# Patient Record
Sex: FEMALE | Race: WHITE | NOT HISPANIC OR LATINO | Employment: OTHER | URBAN - METROPOLITAN AREA
[De-identification: names, ages, dates, MRNs, and addresses within clinical notes are randomized per-mention and may not be internally consistent; named-entity substitution may affect disease eponyms.]

---

## 2024-07-23 ENCOUNTER — HOSPITAL ENCOUNTER (OUTPATIENT)
Dept: RADIOLOGY | Facility: CLINIC | Age: 59
Discharge: HOME | End: 2024-07-23
Payer: COMMERCIAL

## 2024-07-23 DIAGNOSIS — R74.8 ABNORMAL LEVELS OF OTHER SERUM ENZYMES: ICD-10-CM

## 2024-07-23 DIAGNOSIS — M54.6 PAIN IN THORACIC SPINE: ICD-10-CM

## 2024-07-23 PROCEDURE — 72072 X-RAY EXAM THORAC SPINE 3VWS: CPT | Performed by: RADIOLOGY

## 2024-07-23 PROCEDURE — 72072 X-RAY EXAM THORAC SPINE 3VWS: CPT

## 2024-07-31 ENCOUNTER — HOSPITAL ENCOUNTER (INPATIENT)
Facility: HOSPITAL | Age: 59
End: 2024-07-31
Attending: STUDENT IN AN ORGANIZED HEALTH CARE EDUCATION/TRAINING PROGRAM | Admitting: INTERNAL MEDICINE
Payer: COMMERCIAL

## 2024-07-31 ENCOUNTER — HOSPITAL ENCOUNTER (OUTPATIENT)
Dept: RADIOLOGY | Facility: CLINIC | Age: 59
Discharge: HOME | End: 2024-07-31
Payer: COMMERCIAL

## 2024-07-31 ENCOUNTER — APPOINTMENT (OUTPATIENT)
Dept: RADIOLOGY | Facility: HOSPITAL | Age: 59
End: 2024-07-31
Payer: COMMERCIAL

## 2024-07-31 ENCOUNTER — CLINICAL SUPPORT (OUTPATIENT)
Dept: EMERGENCY MEDICINE | Facility: HOSPITAL | Age: 59
End: 2024-07-31
Payer: COMMERCIAL

## 2024-07-31 DIAGNOSIS — R60.0 LOCALIZED EDEMA: ICD-10-CM

## 2024-07-31 DIAGNOSIS — I82.220 TUMOR OF RIGHT KIDNEY WITH THROMBUS OF IVC (MULTI): ICD-10-CM

## 2024-07-31 DIAGNOSIS — Z74.09 IMPAIRED FUNCTIONAL MOBILITY AND ENDURANCE: ICD-10-CM

## 2024-07-31 DIAGNOSIS — D49.511 TUMOR OF RIGHT KIDNEY WITH THROMBUS OF IVC (MULTI): ICD-10-CM

## 2024-07-31 DIAGNOSIS — C34.90 NON-SMALL CELL LUNG CANCER, UNSPECIFIED LATERALITY (MULTI): ICD-10-CM

## 2024-07-31 DIAGNOSIS — E87.6 HYPOKALEMIA: Primary | ICD-10-CM

## 2024-07-31 DIAGNOSIS — G89.18 POST-OP PAIN: ICD-10-CM

## 2024-07-31 DIAGNOSIS — K59.03 CONSTIPATION DUE TO PAIN MEDICATION: ICD-10-CM

## 2024-07-31 DIAGNOSIS — R91.8 MASS OF UPPER LOBE OF LEFT LUNG: ICD-10-CM

## 2024-07-31 DIAGNOSIS — M89.8X8 MASS OF SPINE: ICD-10-CM

## 2024-07-31 DIAGNOSIS — N17.9 AKI (ACUTE KIDNEY INJURY) (CMS-HCC): ICD-10-CM

## 2024-07-31 DIAGNOSIS — C80.1 MALIGNANCY (MULTI): ICD-10-CM

## 2024-07-31 DIAGNOSIS — R74.8 ABNORMAL LEVELS OF OTHER SERUM ENZYMES: ICD-10-CM

## 2024-07-31 DIAGNOSIS — Z13.6 ENCOUNTER FOR SCREENING FOR CARDIOVASCULAR DISORDERS: ICD-10-CM

## 2024-07-31 DIAGNOSIS — I82.220 IVC THROMBOSIS (MULTI): ICD-10-CM

## 2024-07-31 DIAGNOSIS — N28.89 RENAL MASS: ICD-10-CM

## 2024-07-31 LAB
ALBUMIN SERPL BCP-MCNC: 2.8 G/DL (ref 3.4–5)
ALP SERPL-CCNC: 188 U/L (ref 33–136)
ALT SERPL W P-5'-P-CCNC: 16 U/L (ref 7–45)
ANION GAP SERPL CALC-SCNC: 17 MMOL/L (ref 10–20)
APTT PPP: 29 SECONDS (ref 27–38)
AST SERPL W P-5'-P-CCNC: 25 U/L (ref 9–39)
ATRIAL RATE: 105 BPM
BASOPHILS # BLD AUTO: 0.1 X10*3/UL (ref 0–0.1)
BASOPHILS NFR BLD AUTO: 0.7 %
BILIRUB SERPL-MCNC: 0.8 MG/DL (ref 0–1.2)
BNP SERPL-MCNC: 51 PG/ML (ref 0–99)
BUN SERPL-MCNC: 18 MG/DL (ref 6–23)
CALCIUM SERPL-MCNC: 8.7 MG/DL (ref 8.6–10.6)
CARDIAC TROPONIN I PNL SERPL HS: 5 NG/L (ref 0–34)
CARDIAC TROPONIN I PNL SERPL HS: 5 NG/L (ref 0–34)
CHLORIDE SERPL-SCNC: 94 MMOL/L (ref 98–107)
CO2 SERPL-SCNC: 26 MMOL/L (ref 21–32)
CREAT SERPL-MCNC: 1.49 MG/DL (ref 0.5–1.05)
EGFRCR SERPLBLD CKD-EPI 2021: 40 ML/MIN/1.73M*2
EOSINOPHIL # BLD AUTO: 0.21 X10*3/UL (ref 0–0.7)
EOSINOPHIL NFR BLD AUTO: 1.4 %
ERYTHROCYTE [DISTWIDTH] IN BLOOD BY AUTOMATED COUNT: 14 % (ref 11.5–14.5)
GLUCOSE SERPL-MCNC: 121 MG/DL (ref 74–99)
HCT VFR BLD AUTO: 31.9 % (ref 36–46)
HGB BLD-MCNC: 10.3 G/DL (ref 12–16)
IMM GRANULOCYTES # BLD AUTO: 0.19 X10*3/UL (ref 0–0.7)
IMM GRANULOCYTES NFR BLD AUTO: 1.3 % (ref 0–0.9)
INR PPP: 1.5 (ref 0.9–1.1)
LACTATE SERPL-SCNC: 0.9 MMOL/L (ref 0.4–2)
LACTATE SERPL-SCNC: 2.6 MMOL/L (ref 0.4–2)
LIPASE SERPL-CCNC: 28 U/L (ref 9–82)
LYMPHOCYTES # BLD AUTO: 1.43 X10*3/UL (ref 1.2–4.8)
LYMPHOCYTES NFR BLD AUTO: 9.4 %
MAGNESIUM SERPL-MCNC: 2.25 MG/DL (ref 1.6–2.4)
MCH RBC QN AUTO: 26.1 PG (ref 26–34)
MCHC RBC AUTO-ENTMCNC: 32.3 G/DL (ref 32–36)
MCV RBC AUTO: 81 FL (ref 80–100)
MONOCYTES # BLD AUTO: 0.98 X10*3/UL (ref 0.1–1)
MONOCYTES NFR BLD AUTO: 6.5 %
NEUTROPHILS # BLD AUTO: 12.25 X10*3/UL (ref 1.2–7.7)
NEUTROPHILS NFR BLD AUTO: 80.7 %
NRBC BLD-RTO: 0 /100 WBCS (ref 0–0)
P OFFSET: 197 MS
P ONSET: 171 MS
PLATELET # BLD AUTO: 601 X10*3/UL (ref 150–450)
POTASSIUM SERPL-SCNC: 2.7 MMOL/L (ref 3.5–5.3)
PR INTERVAL: 100 MS
PROT SERPL-MCNC: 7.3 G/DL (ref 6.4–8.2)
PROTHROMBIN TIME: 17.1 SECONDS (ref 9.8–12.8)
Q ONSET: 221 MS
QRS COUNT: 18 BEATS
QRS DURATION: 78 MS
QT INTERVAL: 380 MS
QTC CALCULATION(BAZETT): 502 MS
QTC FREDERICIA: 457 MS
R AXIS: 8 DEGREES
RBC # BLD AUTO: 3.94 X10*6/UL (ref 4–5.2)
SODIUM SERPL-SCNC: 134 MMOL/L (ref 136–145)
T AXIS: 92 DEGREES
T OFFSET: 411 MS
VENTRICULAR RATE: 105 BPM
WBC # BLD AUTO: 15.2 X10*3/UL (ref 4.4–11.3)

## 2024-07-31 PROCEDURE — 82040 ASSAY OF SERUM ALBUMIN: CPT

## 2024-07-31 PROCEDURE — 2550000001 HC RX 255 CONTRASTS: Mod: SE | Performed by: STUDENT IN AN ORGANIZED HEALTH CARE EDUCATION/TRAINING PROGRAM

## 2024-07-31 PROCEDURE — 86901 BLOOD TYPING SEROLOGIC RH(D): CPT

## 2024-07-31 PROCEDURE — 83690 ASSAY OF LIPASE: CPT | Performed by: STUDENT IN AN ORGANIZED HEALTH CARE EDUCATION/TRAINING PROGRAM

## 2024-07-31 PROCEDURE — 1200000002 HC GENERAL ROOM WITH TELEMETRY DAILY

## 2024-07-31 PROCEDURE — 99223 1ST HOSP IP/OBS HIGH 75: CPT | Performed by: SURGERY

## 2024-07-31 PROCEDURE — 83735 ASSAY OF MAGNESIUM: CPT | Performed by: STUDENT IN AN ORGANIZED HEALTH CARE EDUCATION/TRAINING PROGRAM

## 2024-07-31 PROCEDURE — 93005 ELECTROCARDIOGRAM TRACING: CPT

## 2024-07-31 PROCEDURE — 36415 COLL VENOUS BLD VENIPUNCTURE: CPT | Performed by: STUDENT IN AN ORGANIZED HEALTH CARE EDUCATION/TRAINING PROGRAM

## 2024-07-31 PROCEDURE — 83930 ASSAY OF BLOOD OSMOLALITY: CPT

## 2024-07-31 PROCEDURE — 2500000004 HC RX 250 GENERAL PHARMACY W/ HCPCS (ALT 636 FOR OP/ED): Mod: SE

## 2024-07-31 PROCEDURE — 96375 TX/PRO/DX INJ NEW DRUG ADDON: CPT

## 2024-07-31 PROCEDURE — 76705 ECHO EXAM OF ABDOMEN: CPT

## 2024-07-31 PROCEDURE — 2500000004 HC RX 250 GENERAL PHARMACY W/ HCPCS (ALT 636 FOR OP/ED): Mod: SE | Performed by: STUDENT IN AN ORGANIZED HEALTH CARE EDUCATION/TRAINING PROGRAM

## 2024-07-31 PROCEDURE — 74177 CT ABD & PELVIS W/CONTRAST: CPT

## 2024-07-31 PROCEDURE — 76705 ECHO EXAM OF ABDOMEN: CPT | Performed by: STUDENT IN AN ORGANIZED HEALTH CARE EDUCATION/TRAINING PROGRAM

## 2024-07-31 PROCEDURE — 83605 ASSAY OF LACTIC ACID: CPT | Performed by: STUDENT IN AN ORGANIZED HEALTH CARE EDUCATION/TRAINING PROGRAM

## 2024-07-31 PROCEDURE — 80053 COMPREHEN METABOLIC PANEL: CPT | Performed by: STUDENT IN AN ORGANIZED HEALTH CARE EDUCATION/TRAINING PROGRAM

## 2024-07-31 PROCEDURE — 99255 IP/OBS CONSLTJ NEW/EST HI 80: CPT | Performed by: SURGERY

## 2024-07-31 PROCEDURE — 85025 COMPLETE CBC W/AUTO DIFF WBC: CPT | Performed by: STUDENT IN AN ORGANIZED HEALTH CARE EDUCATION/TRAINING PROGRAM

## 2024-07-31 PROCEDURE — 71275 CT ANGIOGRAPHY CHEST: CPT

## 2024-07-31 PROCEDURE — 84484 ASSAY OF TROPONIN QUANT: CPT | Performed by: STUDENT IN AN ORGANIZED HEALTH CARE EDUCATION/TRAINING PROGRAM

## 2024-07-31 PROCEDURE — 2500000001 HC RX 250 WO HCPCS SELF ADMINISTERED DRUGS (ALT 637 FOR MEDICARE OP): Mod: SE | Performed by: STUDENT IN AN ORGANIZED HEALTH CARE EDUCATION/TRAINING PROGRAM

## 2024-07-31 PROCEDURE — 71275 CT ANGIOGRAPHY CHEST: CPT | Performed by: STUDENT IN AN ORGANIZED HEALTH CARE EDUCATION/TRAINING PROGRAM

## 2024-07-31 PROCEDURE — 99285 EMERGENCY DEPT VISIT HI MDM: CPT

## 2024-07-31 PROCEDURE — 74177 CT ABD & PELVIS W/CONTRAST: CPT | Performed by: STUDENT IN AN ORGANIZED HEALTH CARE EDUCATION/TRAINING PROGRAM

## 2024-07-31 PROCEDURE — 83880 ASSAY OF NATRIURETIC PEPTIDE: CPT | Performed by: STUDENT IN AN ORGANIZED HEALTH CARE EDUCATION/TRAINING PROGRAM

## 2024-07-31 PROCEDURE — 82533 TOTAL CORTISOL: CPT | Performed by: INTERNAL MEDICINE

## 2024-07-31 PROCEDURE — 96374 THER/PROPH/DIAG INJ IV PUSH: CPT

## 2024-07-31 PROCEDURE — 96361 HYDRATE IV INFUSION ADD-ON: CPT

## 2024-07-31 PROCEDURE — 85610 PROTHROMBIN TIME: CPT | Performed by: STUDENT IN AN ORGANIZED HEALTH CARE EDUCATION/TRAINING PROGRAM

## 2024-07-31 RX ORDER — ONDANSETRON HYDROCHLORIDE 2 MG/ML
4 INJECTION, SOLUTION INTRAVENOUS ONCE
Status: COMPLETED | OUTPATIENT
Start: 2024-07-31 | End: 2024-07-31

## 2024-07-31 RX ORDER — ATORVASTATIN CALCIUM 40 MG/1
40 TABLET, FILM COATED ORAL EVERY EVENING
COMMUNITY

## 2024-07-31 RX ORDER — POTASSIUM CHLORIDE 750 MG/1
10 TABLET, EXTENDED RELEASE ORAL EVERY MORNING
COMMUNITY
Start: 2024-07-25 | End: 2024-08-24

## 2024-07-31 RX ORDER — CITALOPRAM 20 MG/1
20 TABLET, FILM COATED ORAL EVERY EVENING
COMMUNITY
Start: 2024-07-22

## 2024-07-31 RX ORDER — LISINOPRIL AND HYDROCHLOROTHIAZIDE 10; 12.5 MG/1; MG/1
1 TABLET ORAL EVERY MORNING
COMMUNITY

## 2024-07-31 RX ORDER — POTASSIUM CHLORIDE 1.5 G/1.58G
40 POWDER, FOR SOLUTION ORAL ONCE
Status: COMPLETED | OUTPATIENT
Start: 2024-07-31 | End: 2024-07-31

## 2024-07-31 RX ORDER — HYDROXYZINE HYDROCHLORIDE 25 MG/1
25 TABLET, FILM COATED ORAL EVERY MORNING
COMMUNITY

## 2024-07-31 RX ORDER — HYDROMORPHONE HYDROCHLORIDE 1 MG/ML
1 INJECTION, SOLUTION INTRAMUSCULAR; INTRAVENOUS; SUBCUTANEOUS ONCE
Status: COMPLETED | OUTPATIENT
Start: 2024-07-31 | End: 2024-07-31

## 2024-07-31 RX ORDER — ATORVASTATIN CALCIUM 40 MG/1
40 TABLET, FILM COATED ORAL DAILY
Status: DISCONTINUED | OUTPATIENT
Start: 2024-08-01 | End: 2024-08-16 | Stop reason: HOSPADM

## 2024-07-31 RX ORDER — POLYETHYLENE GLYCOL 3350 17 G/17G
17 POWDER, FOR SOLUTION ORAL DAILY
Status: DISCONTINUED | OUTPATIENT
Start: 2024-08-01 | End: 2024-08-06

## 2024-07-31 RX ORDER — AMLODIPINE BESYLATE 10 MG/1
1 TABLET ORAL DAILY
COMMUNITY
Start: 2024-07-17 | End: 2024-07-31 | Stop reason: ALTCHOICE

## 2024-07-31 RX ORDER — HEPARIN SODIUM 10000 [USP'U]/100ML
0-4500 INJECTION, SOLUTION INTRAVENOUS CONTINUOUS
Status: DISCONTINUED | OUTPATIENT
Start: 2024-07-31 | End: 2024-08-07

## 2024-07-31 RX ORDER — CITALOPRAM 20 MG/1
20 TABLET, FILM COATED ORAL DAILY
Status: DISCONTINUED | OUTPATIENT
Start: 2024-08-01 | End: 2024-08-16 | Stop reason: HOSPADM

## 2024-07-31 RX ORDER — HYDROXYZINE HYDROCHLORIDE 25 MG/1
25 TABLET, FILM COATED ORAL DAILY
Status: DISCONTINUED | OUTPATIENT
Start: 2024-08-01 | End: 2024-08-09

## 2024-07-31 ASSESSMENT — LIFESTYLE VARIABLES
HAVE PEOPLE ANNOYED YOU BY CRITICIZING YOUR DRINKING: NO
EVER HAD A DRINK FIRST THING IN THE MORNING TO STEADY YOUR NERVES TO GET RID OF A HANGOVER: NO
EVER FELT BAD OR GUILTY ABOUT YOUR DRINKING: NO
TOTAL SCORE: 0
HAVE YOU EVER FELT YOU SHOULD CUT DOWN ON YOUR DRINKING: NO

## 2024-07-31 ASSESSMENT — PAIN - FUNCTIONAL ASSESSMENT
PAIN_FUNCTIONAL_ASSESSMENT: 0-10
PAIN_FUNCTIONAL_ASSESSMENT: 0-10

## 2024-07-31 ASSESSMENT — PAIN SCALES - GENERAL
PAINLEVEL_OUTOF10: 5 - MODERATE PAIN
PAINLEVEL_OUTOF10: 10 - WORST POSSIBLE PAIN

## 2024-07-31 ASSESSMENT — PAIN DESCRIPTION - LOCATION: LOCATION: ABDOMEN

## 2024-07-31 ASSESSMENT — COLUMBIA-SUICIDE SEVERITY RATING SCALE - C-SSRS
6. HAVE YOU EVER DONE ANYTHING, STARTED TO DO ANYTHING, OR PREPARED TO DO ANYTHING TO END YOUR LIFE?: NO
2. HAVE YOU ACTUALLY HAD ANY THOUGHTS OF KILLING YOURSELF?: NO
1. IN THE PAST MONTH, HAVE YOU WISHED YOU WERE DEAD OR WISHED YOU COULD GO TO SLEEP AND NOT WAKE UP?: NO

## 2024-07-31 NOTE — ED PROVIDER NOTES
CC: Abdominal Pain and Back Pain     History provided by: Patient  Limitations to History: None    HPI:  Patient is a 58-year-old female with history of hypertension who presents as a critical activation in setting of hypotension, tachycardia.  She is mentating appropriately, no acute respiratory distress.  She denies any history of blood thinners, malignancy however states she was advised to come in by her doctor due to ultrasound findings that were done today.  She states she had an ultrasound of her kidney and liver done.  Ultrasound results reviewed which show IVC and right renal thrombus.  She denies any current chest pain, shortness of breath, blood thinner use, fevers, chills, cough, chest pain, leg swelling.  She denies any history of blood clots.  She states she was short of breath earlier and she states she has abdominal pain and has back pain but denies any saddle anesthesia, urinary retention.    External Records Reviewed:  I reviewed prior ED visits, Care Everywhere, discharge summaries and outpatient records as appropriate.   ???????????????????????????????????????????????????????????????  Triage Vitals:  T 36.5 °C (97.7 °F)  HR (!) 106  BP 83/55  RR 18  O2 100 % None (Room air)    Physical Exam  Vitals and nursing note reviewed.   Constitutional:       General: She is not in acute distress.     Appearance: Normal appearance.   HENT:      Head: Normocephalic and atraumatic.      Mouth/Throat:      Mouth: Mucous membranes are dry.   Eyes:      Conjunctiva/sclera: Conjunctivae normal.   Cardiovascular:      Rate and Rhythm: Regular rhythm. Tachycardia present.   Pulmonary:      Effort: Pulmonary effort is normal. No respiratory distress.      Breath sounds: Normal breath sounds.   Abdominal:      General: Abdomen is flat. There is no distension.      Palpations: Abdomen is soft.      Comments: RUQ TTP, no guarding or rebound   Musculoskeletal:         General: Normal range of motion.      Cervical  back: Normal range of motion and neck supple.   Skin:     General: Skin is warm and dry.      Comments: No calf tenderness bilaterally   Neurological:      General: No focal deficit present.      Mental Status: She is alert and oriented to person, place, and time. Mental status is at baseline.   Psychiatric:         Mood and Affect: Mood normal.         Behavior: Behavior normal.        ???????????????????????????????????????????????????????????????  ED Course/Treatment/Medical Decision Making  MDM:  Patient is a 58-year-old female who presents as a critical activation in the setting of hypotension, tachycardia.  Patient is overall mentating appropriately, no acute respiratory distress, lungs are clear to auscultation bilaterally.  Ultrasound findings discussed with patient as stated in ED course.  Labs obtained and CT angio for pulmonary embolism obtained as well given history of possible new malignancy shortness of breath.  CT abdomen pelvis with contrast obtained as well.  Patient given IV fluids and Zofran.  I believe dehydration may be contributing to presentation and hypotension as patient states she has been vomiting today, nonbloody nonbilious.  I discussed admission with patient and she is agreeable.  Other differentials considered include PE, sepsis, ACS, biliary pathology.      ED Course:  ED Course as of 07/31/24 2133 Wed Jul 31, 2024 1736 External chart review:  RUQ US today  IMPRESSION:  1. Right upper pole solid renal mass measuring 10.0 x 6.2 x 7.8 cm  likely renal neoplasm with likely involvement of the ipsilateral  renal vein. Adjacent IVC thrombus measuring 4.5 x 2.4 x 2.8 cm with  no definite intrinsic vascularity, still tumor thrombus is highly  suspected.  2. Wall adherent gallbladder nodules measuring up to 0.4 cm. Findings  could represent gallbladder polyps.   [SS]   1740 EKG reviewed sinus tachycardia rate 105, ID interval 100 ms, QRS 78 ms, QTc 502 ms, no acute ST segment elevations or  depressions, no prior to compare [SA]   1904 Labs reviewed with hypokalemia of 2.7, creatinine is elevated, no prior to compare, alkaline phosphatase is elevated [SA]   1904 CBC with leukocytosis of 15.2, anemia of 10.3 and thrombocytosis of 600 [SA]   2019 Vascular surgery consulted [SA]   2020 Troponin, BNP wnl [SA]   2112 Discussed with radiology: large mass encasing L main pulmonary artery, no PE, large subcarinal lymph node causing mass effect on esophagus, CT bilateral necrotic adrenal masses with mass extension of the right into the IVC. 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    Differential diagnoses considered include but are not limited to: See MDM/Below:    Social Determinants Limiting Care:  None identified The following actions were taken to address these social determinants:      Discussion of Management with Other Providers: See MDM/Below:    Disposition:  Admitted    BRIANDA Anguiano, PGY-3    I reviewed the case with the attending ED physician. The attending ED physician agrees with the plan. Patient and/or patient´s representative was counseled regarding labs, imaging, likely diagnosis, and plan. All questions were answered.    Disclaimer: This note was dictated by speech  recognition.  Attempt at proofreading was made to minimize errors.  Errors in transcription may be present.  Please call if questions.    Procedures ? [employer] last updated 7/31/2024 9:33 PM        Darlin Albarran, DO  Resident  07/31/24 2118       Darlin Albarran, DO  Resident  07/31/24 2120       Darlin Albarran, DO  Resident  07/31/24 2133    Emergency Medicine Attending Attestation:     ED Course as of 08/01/24 0327   Wed Jul 31, 2024   1736 External chart review:  RUQ US today  IMPRESSION:  1. Right upper pole solid renal mass measuring 10.0 x 6.2 x 7.8 cm  likely renal neoplasm with likely involvement of the ipsilateral  renal vein. Adjacent IVC thrombus measuring 4.5 x 2.4 x 2.8 cm with  no definite intrinsic vascularity, still tumor thrombus is highly  suspected.  2. Wall adherent gallbladder nodules measuring up to 0.4 cm. Findings  could represent gallbladder polyps.   [SS]   1740 EKG reviewed sinus tachycardia rate 105, WA interval 100 ms, QRS 78 ms, QTc 502 ms, no acute ST segment elevations or depressions, no prior to compare [SA]   1904 Labs reviewed with hypokalemia of 2.7, creatinine is elevated, no prior to compare, alkaline phosphatase is elevated [SA]   1904 CBC with leukocytosis of 15.2, anemia of 10.3 and thrombocytosis of 600 [SA]   2019 Vascular surgery consulted [SA]   2020 Troponin, BNP wnl [SA]   2112 Discussed with radiology: large mass encasing L main pulmonary artery, no PE, large subcarinal lymph node causing mass effect on esophagus, CT bilateral necrotic adrenal masses with mass extension of the right into the IVC. 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  I have personally performed a substantive portion of the encounter.  I have seen and examined the patient; agree with the workup, evaluation, MDM, management and diagnosis.  The care plan has been discussed with the resident/fellow; I have reviewed the resident/fellow’s note and agree with the documented findings with the exception/addition of the following:    Pt presents after outpt RUQ US showed R renal mass and likely IVC thrombus. Pt reports hasn't been eating much, appears dry on exam. Bedside POCUS without evidence of R heart strain to suggest large PE. She was given total of 2.5 L IVF with improvement in her tachycardia and hypotension. CTPE and CT abd/pelvis ordered for further eval. Vasc surg consulted, rec heparin and oncologic workup but otherwise no acute interventions for IVC thrombus. Pt was upated on CT results. Admitted to medicine.     Steffen Simerlink, MD Steffen Simerlink, MD  08/01/24 0334

## 2024-07-31 NOTE — ED TRIAGE NOTES
"Sent from  mentor for large mass on R kidney. Pt states the mass is causing pain to spine and abdomen, states it is better when she is lying down. Pt states she went to [facility] for abd pain and similar complaints when they did an ultrasound and found mass. Pt has hx of HTN, HLD, HARJEET, and major depressive disorder. Pt states pain is \"95/10.\"  "

## 2024-07-31 NOTE — PROGRESS NOTES
Patient has been identified as having an emergent need for administration of iodinated contrast for CT scan prior to result of laboratory studies OR despite known elevated GFR due to possibility of life and/or limb threatening pathology.    I acknowledge the risks and benefits of emergently proceeding with contrast administration including that, at present, it is the position of the [employer] that contrast induced nephropathy (ANIVAL) is a rare but possible consequence. At this time the benefits of proceeding with contrast administration outweigh the risks.    Attempts will be made to mitigate possible ANIVAL risk with IV fluid hydration if able.    Darlin Albarran, PGY3

## 2024-08-01 ENCOUNTER — APPOINTMENT (OUTPATIENT)
Dept: RADIOLOGY | Facility: HOSPITAL | Age: 59
End: 2024-08-01
Payer: COMMERCIAL

## 2024-08-01 PROBLEM — R91.8 MASS OF UPPER LOBE OF LEFT LUNG: Status: ACTIVE | Noted: 2024-08-01

## 2024-08-01 PROBLEM — C80.1 MALIGNANCY (MULTI): Status: RESOLVED | Noted: 2024-08-01 | Resolved: 2024-08-01

## 2024-08-01 PROBLEM — C80.1 MALIGNANCY (MULTI): Status: ACTIVE | Noted: 2024-08-01

## 2024-08-01 LAB
ABO GROUP (TYPE) IN BLOOD: NORMAL
ABO GROUP (TYPE) IN BLOOD: NORMAL
ALBUMIN SERPL BCP-MCNC: 2.4 G/DL (ref 3.4–5)
ALBUMIN SERPL BCP-MCNC: 2.4 G/DL (ref 3.4–5)
ALBUMIN SERPL BCP-MCNC: 2.5 G/DL (ref 3.4–5)
ALBUMIN SERPL BCP-MCNC: 2.6 G/DL (ref 3.4–5)
ALP SERPL-CCNC: 154 U/L (ref 33–136)
ALT SERPL W P-5'-P-CCNC: 11 U/L (ref 7–45)
ANION GAP SERPL CALC-SCNC: 12 MMOL/L (ref 10–20)
ANION GAP SERPL CALC-SCNC: 14 MMOL/L (ref 10–20)
ANION GAP SERPL CALC-SCNC: 14 MMOL/L (ref 10–20)
ANION GAP SERPL CALC-SCNC: 15 MMOL/L (ref 10–20)
ANTIBODY SCREEN: NORMAL
APPEARANCE UR: CLEAR
APTT PPP: 46 SECONDS (ref 27–38)
AST SERPL W P-5'-P-CCNC: 14 U/L (ref 9–39)
BASOPHILS # BLD AUTO: 0.1 X10*3/UL (ref 0–0.1)
BASOPHILS # BLD AUTO: 0.12 X10*3/UL (ref 0–0.1)
BASOPHILS NFR BLD AUTO: 0.8 %
BASOPHILS NFR BLD AUTO: 0.9 %
BILIRUB SERPL-MCNC: 0.6 MG/DL (ref 0–1.2)
BILIRUB UR STRIP.AUTO-MCNC: NEGATIVE MG/DL
BLOOD EXPIRATION DATE: NORMAL
BUN SERPL-MCNC: 16 MG/DL (ref 6–23)
BUN SERPL-MCNC: 17 MG/DL (ref 6–23)
BUN SERPL-MCNC: 18 MG/DL (ref 6–23)
BUN SERPL-MCNC: 19 MG/DL (ref 6–23)
CALCIUM SERPL-MCNC: 7.9 MG/DL (ref 8.6–10.6)
CALCIUM SERPL-MCNC: 7.9 MG/DL (ref 8.6–10.6)
CALCIUM SERPL-MCNC: 8 MG/DL (ref 8.6–10.6)
CALCIUM SERPL-MCNC: 8 MG/DL (ref 8.6–10.6)
CHLORIDE SERPL-SCNC: 101 MMOL/L (ref 98–107)
CHLORIDE SERPL-SCNC: 97 MMOL/L (ref 98–107)
CHLORIDE SERPL-SCNC: 97 MMOL/L (ref 98–107)
CHLORIDE SERPL-SCNC: 99 MMOL/L (ref 98–107)
CHLORIDE UR-SCNC: <15 MMOL/L
CHLORIDE/CREATININE (MMOL/G) IN URINE: NORMAL
CO2 SERPL-SCNC: 22 MMOL/L (ref 21–32)
CO2 SERPL-SCNC: 25 MMOL/L (ref 21–32)
COLOR UR: YELLOW
CORTIS AM PEAK SERPL-MSCNC: 29.7 UG/DL (ref 5–20)
CREAT SERPL-MCNC: 1.12 MG/DL (ref 0.5–1.05)
CREAT SERPL-MCNC: 1.16 MG/DL (ref 0.5–1.05)
CREAT SERPL-MCNC: 1.29 MG/DL (ref 0.5–1.05)
CREAT SERPL-MCNC: 1.39 MG/DL (ref 0.5–1.05)
CREAT UR-MCNC: 75.1 MG/DL (ref 20–320)
DISPENSE STATUS: NORMAL
EGFRCR SERPLBLD CKD-EPI 2021: 44 ML/MIN/1.73M*2
EGFRCR SERPLBLD CKD-EPI 2021: 48 ML/MIN/1.73M*2
EGFRCR SERPLBLD CKD-EPI 2021: 54 ML/MIN/1.73M*2
EGFRCR SERPLBLD CKD-EPI 2021: 56 ML/MIN/1.73M*2
EOSINOPHIL # BLD AUTO: 0.14 X10*3/UL (ref 0–0.7)
EOSINOPHIL # BLD AUTO: 0.19 X10*3/UL (ref 0–0.7)
EOSINOPHIL NFR BLD AUTO: 1.1 %
EOSINOPHIL NFR BLD AUTO: 1.5 %
ERYTHROCYTE [DISTWIDTH] IN BLOOD BY AUTOMATED COUNT: 14.4 % (ref 11.5–14.5)
ERYTHROCYTE [DISTWIDTH] IN BLOOD BY AUTOMATED COUNT: 14.5 % (ref 11.5–14.5)
GLUCOSE SERPL-MCNC: 109 MG/DL (ref 74–99)
GLUCOSE SERPL-MCNC: 91 MG/DL (ref 74–99)
GLUCOSE SERPL-MCNC: 94 MG/DL (ref 74–99)
GLUCOSE SERPL-MCNC: 97 MG/DL (ref 74–99)
GLUCOSE UR STRIP.AUTO-MCNC: NORMAL MG/DL
HCT VFR BLD AUTO: 25.3 % (ref 36–46)
HCT VFR BLD AUTO: 29.2 % (ref 36–46)
HGB BLD-MCNC: 8.2 G/DL (ref 12–16)
HGB BLD-MCNC: 9.1 G/DL (ref 12–16)
IMM GRANULOCYTES # BLD AUTO: 0.11 X10*3/UL (ref 0–0.7)
IMM GRANULOCYTES # BLD AUTO: 0.12 X10*3/UL (ref 0–0.7)
IMM GRANULOCYTES NFR BLD AUTO: 0.9 % (ref 0–0.9)
IMM GRANULOCYTES NFR BLD AUTO: 0.9 % (ref 0–0.9)
INR PPP: 1.6 (ref 0.9–1.1)
KETONES UR STRIP.AUTO-MCNC: NEGATIVE MG/DL
LEUKOCYTE ESTERASE UR QL STRIP.AUTO: NEGATIVE
LYMPHOCYTES # BLD AUTO: 1.94 X10*3/UL (ref 1.2–4.8)
LYMPHOCYTES # BLD AUTO: 2.15 X10*3/UL (ref 1.2–4.8)
LYMPHOCYTES NFR BLD AUTO: 15 %
LYMPHOCYTES NFR BLD AUTO: 16.9 %
MAGNESIUM SERPL-MCNC: 2.17 MG/DL (ref 1.6–2.4)
MCH RBC QN AUTO: 27 PG (ref 26–34)
MCH RBC QN AUTO: 27 PG (ref 26–34)
MCHC RBC AUTO-ENTMCNC: 31.2 G/DL (ref 32–36)
MCHC RBC AUTO-ENTMCNC: 32.4 G/DL (ref 32–36)
MCV RBC AUTO: 83 FL (ref 80–100)
MCV RBC AUTO: 87 FL (ref 80–100)
MONOCYTES # BLD AUTO: 0.81 X10*3/UL (ref 0.1–1)
MONOCYTES # BLD AUTO: 0.85 X10*3/UL (ref 0.1–1)
MONOCYTES NFR BLD AUTO: 6.2 %
MONOCYTES NFR BLD AUTO: 6.7 %
NEUTROPHILS # BLD AUTO: 9.38 X10*3/UL (ref 1.2–7.7)
NEUTROPHILS # BLD AUTO: 9.81 X10*3/UL (ref 1.2–7.7)
NEUTROPHILS NFR BLD AUTO: 73.5 %
NEUTROPHILS NFR BLD AUTO: 75.6 %
NITRITE UR QL STRIP.AUTO: NEGATIVE
NRBC BLD-RTO: 0 /100 WBCS (ref 0–0)
NRBC BLD-RTO: 0 /100 WBCS (ref 0–0)
OSMOLALITY SERPL: 275 MOSM/KG (ref 280–300)
OSMOLALITY UR: 270 MOSM/KG (ref 200–1200)
PH UR STRIP.AUTO: 5.5 [PH]
PHOSPHATE SERPL-MCNC: 3.8 MG/DL (ref 2.5–4.9)
PHOSPHATE SERPL-MCNC: 4.1 MG/DL (ref 2.5–4.9)
PHOSPHATE SERPL-MCNC: 4.8 MG/DL (ref 2.5–4.9)
PLATELET # BLD AUTO: 537 X10*3/UL (ref 150–450)
PLATELET # BLD AUTO: 566 X10*3/UL (ref 150–450)
POTASSIUM SERPL-SCNC: 2.9 MMOL/L (ref 3.5–5.3)
POTASSIUM SERPL-SCNC: 3.2 MMOL/L (ref 3.5–5.3)
POTASSIUM SERPL-SCNC: 3.3 MMOL/L (ref 3.5–5.3)
POTASSIUM SERPL-SCNC: 3.8 MMOL/L (ref 3.5–5.3)
POTASSIUM UR-SCNC: 20 MMOL/L
POTASSIUM/CREAT UR-RTO: 27 MMOL/G CREAT
PRODUCT BLOOD TYPE: 6200
PRODUCT CODE: NORMAL
PROT SERPL-MCNC: 6.1 G/DL (ref 6.4–8.2)
PROT UR STRIP.AUTO-MCNC: ABNORMAL MG/DL
PROTHROMBIN TIME: 18.1 SECONDS (ref 9.8–12.8)
RBC # BLD AUTO: 3.04 X10*6/UL (ref 4–5.2)
RBC # BLD AUTO: 3.37 X10*6/UL (ref 4–5.2)
RBC # UR STRIP.AUTO: ABNORMAL /UL
RBC #/AREA URNS AUTO: ABNORMAL /HPF
RH FACTOR (ANTIGEN D): NORMAL
RH FACTOR (ANTIGEN D): NORMAL
SODIUM SERPL-SCNC: 131 MMOL/L (ref 136–145)
SODIUM SERPL-SCNC: 133 MMOL/L (ref 136–145)
SODIUM SERPL-SCNC: 134 MMOL/L (ref 136–145)
SODIUM SERPL-SCNC: 135 MMOL/L (ref 136–145)
SODIUM UR-SCNC: 12 MMOL/L
SODIUM/CREAT UR-RTO: 16 MMOL/G CREAT
SP GR UR STRIP.AUTO: 1.04
SQUAMOUS #/AREA URNS AUTO: ABNORMAL /HPF
UFH PPP CHRO-ACNC: 0.2 IU/ML
UFH PPP CHRO-ACNC: 0.3 IU/ML
UFH PPP CHRO-ACNC: 0.3 IU/ML
UFH PPP CHRO-ACNC: 0.4 IU/ML
UNIT ABO: NORMAL
UNIT NUMBER: NORMAL
UNIT RH: NORMAL
UNIT VOLUME: 278
UROBILINOGEN UR STRIP.AUTO-MCNC: NORMAL MG/DL
WBC # BLD AUTO: 12.8 X10*3/UL (ref 4.4–11.3)
WBC # BLD AUTO: 13 X10*3/UL (ref 4.4–11.3)
WBC #/AREA URNS AUTO: ABNORMAL /HPF

## 2024-08-01 PROCEDURE — 82374 ASSAY BLOOD CARBON DIOXIDE: CPT

## 2024-08-01 PROCEDURE — 85610 PROTHROMBIN TIME: CPT

## 2024-08-01 PROCEDURE — 2500000004 HC RX 250 GENERAL PHARMACY W/ HCPCS (ALT 636 FOR OP/ED)

## 2024-08-01 PROCEDURE — 85520 HEPARIN ASSAY: CPT | Performed by: INTERNAL MEDICINE

## 2024-08-01 PROCEDURE — 85025 COMPLETE CBC W/AUTO DIFF WBC: CPT

## 2024-08-01 PROCEDURE — 72157 MRI CHEST SPINE W/O & W/DYE: CPT

## 2024-08-01 PROCEDURE — 80069 RENAL FUNCTION PANEL: CPT

## 2024-08-01 PROCEDURE — 70553 MRI BRAIN STEM W/O & W/DYE: CPT

## 2024-08-01 PROCEDURE — 2550000001 HC RX 255 CONTRASTS: Performed by: INTERNAL MEDICINE

## 2024-08-01 PROCEDURE — 81003 URINALYSIS AUTO W/O SCOPE: CPT

## 2024-08-01 PROCEDURE — 2500000004 HC RX 250 GENERAL PHARMACY W/ HCPCS (ALT 636 FOR OP/ED): Performed by: STUDENT IN AN ORGANIZED HEALTH CARE EDUCATION/TRAINING PROGRAM

## 2024-08-01 PROCEDURE — 72158 MRI LUMBAR SPINE W/O & W/DYE: CPT

## 2024-08-01 PROCEDURE — 82436 ASSAY OF URINE CHLORIDE: CPT

## 2024-08-01 PROCEDURE — 1200000002 HC GENERAL ROOM WITH TELEMETRY DAILY

## 2024-08-01 PROCEDURE — 36415 COLL VENOUS BLD VENIPUNCTURE: CPT | Performed by: INTERNAL MEDICINE

## 2024-08-01 PROCEDURE — 2500000001 HC RX 250 WO HCPCS SELF ADMINISTERED DRUGS (ALT 637 FOR MEDICARE OP)

## 2024-08-01 PROCEDURE — 99254 IP/OBS CNSLTJ NEW/EST MOD 60: CPT | Performed by: STUDENT IN AN ORGANIZED HEALTH CARE EDUCATION/TRAINING PROGRAM

## 2024-08-01 PROCEDURE — P9037 PLATE PHERES LEUKOREDU IRRAD: HCPCS

## 2024-08-01 PROCEDURE — A9575 INJ GADOTERATE MEGLUMI 0.1ML: HCPCS | Performed by: INTERNAL MEDICINE

## 2024-08-01 PROCEDURE — 72156 MRI NECK SPINE W/O & W/DYE: CPT

## 2024-08-01 PROCEDURE — 83935 ASSAY OF URINE OSMOLALITY: CPT

## 2024-08-01 PROCEDURE — 36415 COLL VENOUS BLD VENIPUNCTURE: CPT

## 2024-08-01 PROCEDURE — 99254 IP/OBS CNSLTJ NEW/EST MOD 60: CPT | Performed by: INTERNAL MEDICINE

## 2024-08-01 PROCEDURE — 99223 1ST HOSP IP/OBS HIGH 75: CPT

## 2024-08-01 PROCEDURE — 36430 TRANSFUSION BLD/BLD COMPNT: CPT

## 2024-08-01 PROCEDURE — 83735 ASSAY OF MAGNESIUM: CPT

## 2024-08-01 RX ORDER — CEFAZOLIN SODIUM 2 G/100ML
2 INJECTION, SOLUTION INTRAVENOUS ONCE
Status: COMPLETED | OUTPATIENT
Start: 2024-08-01 | End: 2024-08-01

## 2024-08-01 RX ORDER — POTASSIUM CHLORIDE 20 MEQ/1
40 TABLET, EXTENDED RELEASE ORAL ONCE
Status: DISCONTINUED | OUTPATIENT
Start: 2024-08-01 | End: 2024-08-01

## 2024-08-01 RX ORDER — ACETAMINOPHEN 325 MG/1
975 TABLET ORAL EVERY 6 HOURS PRN
Status: DISCONTINUED | OUTPATIENT
Start: 2024-08-01 | End: 2024-08-04

## 2024-08-01 RX ORDER — POTASSIUM CHLORIDE 14.9 MG/ML
INJECTION INTRAVENOUS
Status: COMPLETED
Start: 2024-08-01 | End: 2024-08-01

## 2024-08-01 RX ORDER — GADOTERATE MEGLUMINE 376.9 MG/ML
15 INJECTION INTRAVENOUS
Status: COMPLETED | OUTPATIENT
Start: 2024-08-01 | End: 2024-08-01

## 2024-08-01 RX ORDER — POTASSIUM CHLORIDE 14.9 MG/ML
20 INJECTION INTRAVENOUS
Status: COMPLETED | OUTPATIENT
Start: 2024-08-01 | End: 2024-08-01

## 2024-08-01 RX ORDER — HYDROMORPHONE HYDROCHLORIDE 1 MG/ML
0.5 INJECTION, SOLUTION INTRAMUSCULAR; INTRAVENOUS; SUBCUTANEOUS EVERY 4 HOURS PRN
Status: DISCONTINUED | OUTPATIENT
Start: 2024-08-01 | End: 2024-08-02

## 2024-08-01 SDOH — SOCIAL STABILITY: SOCIAL INSECURITY: HAS ANYONE EVER THREATENED TO HURT YOUR FAMILY OR YOUR PETS?: NO

## 2024-08-01 SDOH — ECONOMIC STABILITY: HOUSING INSECURITY: AT ANY TIME IN THE PAST 12 MONTHS, WERE YOU HOMELESS OR LIVING IN A SHELTER (INCLUDING NOW)?: NO

## 2024-08-01 SDOH — SOCIAL STABILITY: SOCIAL INSECURITY: ARE YOU OR HAVE YOU BEEN THREATENED OR ABUSED PHYSICALLY, EMOTIONALLY, OR SEXUALLY BY ANYONE?: NO

## 2024-08-01 SDOH — SOCIAL STABILITY: SOCIAL INSECURITY: DO YOU FEEL UNSAFE GOING BACK TO THE PLACE WHERE YOU ARE LIVING?: NO

## 2024-08-01 SDOH — SOCIAL STABILITY: SOCIAL INSECURITY: HAVE YOU HAD ANY THOUGHTS OF HARMING ANYONE ELSE?: NO

## 2024-08-01 SDOH — ECONOMIC STABILITY: INCOME INSECURITY: HOW HARD IS IT FOR YOU TO PAY FOR THE VERY BASICS LIKE FOOD, HOUSING, MEDICAL CARE, AND HEATING?: NOT VERY HARD

## 2024-08-01 SDOH — ECONOMIC STABILITY: INCOME INSECURITY: IN THE LAST 12 MONTHS, WAS THERE A TIME WHEN YOU WERE NOT ABLE TO PAY THE MORTGAGE OR RENT ON TIME?: NO

## 2024-08-01 SDOH — SOCIAL STABILITY: SOCIAL INSECURITY: WERE YOU ABLE TO COMPLETE ALL THE BEHAVIORAL HEALTH SCREENINGS?: YES

## 2024-08-01 SDOH — SOCIAL STABILITY: SOCIAL INSECURITY: DOES ANYONE TRY TO KEEP YOU FROM HAVING/CONTACTING OTHER FRIENDS OR DOING THINGS OUTSIDE YOUR HOME?: NO

## 2024-08-01 SDOH — SOCIAL STABILITY: SOCIAL INSECURITY: HAVE YOU HAD THOUGHTS OF HARMING ANYONE ELSE?: NO

## 2024-08-01 SDOH — SOCIAL STABILITY: SOCIAL INSECURITY: ARE THERE ANY APPARENT SIGNS OF INJURIES/BEHAVIORS THAT COULD BE RELATED TO ABUSE/NEGLECT?: NO

## 2024-08-01 SDOH — SOCIAL STABILITY: SOCIAL INSECURITY: ABUSE: ADULT

## 2024-08-01 SDOH — ECONOMIC STABILITY: TRANSPORTATION INSECURITY
IN THE PAST 12 MONTHS, HAS LACK OF TRANSPORTATION KEPT YOU FROM MEETINGS, WORK, OR FROM GETTING THINGS NEEDED FOR DAILY LIVING?: NO

## 2024-08-01 SDOH — SOCIAL STABILITY: SOCIAL INSECURITY: DO YOU FEEL ANYONE HAS EXPLOITED OR TAKEN ADVANTAGE OF YOU FINANCIALLY OR OF YOUR PERSONAL PROPERTY?: NO

## 2024-08-01 SDOH — ECONOMIC STABILITY: TRANSPORTATION INSECURITY
IN THE PAST 12 MONTHS, HAS THE LACK OF TRANSPORTATION KEPT YOU FROM MEDICAL APPOINTMENTS OR FROM GETTING MEDICATIONS?: NO

## 2024-08-01 ASSESSMENT — COGNITIVE AND FUNCTIONAL STATUS - GENERAL
DRESSING REGULAR LOWER BODY CLOTHING: A LITTLE
EATING MEALS: A LITTLE
TOILETING: A LITTLE
PATIENT BASELINE BEDBOUND: NO
CLIMB 3 TO 5 STEPS WITH RAILING: A LITTLE
DRESSING REGULAR UPPER BODY CLOTHING: A LITTLE
DAILY ACTIVITIY SCORE: 18
HELP NEEDED FOR BATHING: A LITTLE
WALKING IN HOSPITAL ROOM: A LITTLE
PERSONAL GROOMING: A LITTLE
MOBILITY SCORE: 20
MOVING TO AND FROM BED TO CHAIR: A LITTLE
STANDING UP FROM CHAIR USING ARMS: A LITTLE

## 2024-08-01 ASSESSMENT — ACTIVITIES OF DAILY LIVING (ADL)
GROOMING: NEEDS ASSISTANCE
HEARING - RIGHT EAR: FUNCTIONAL
FEEDING YOURSELF: NEEDS ASSISTANCE
ADEQUATE_TO_COMPLETE_ADL: YES
LACK_OF_TRANSPORTATION: NO
DRESSING YOURSELF: NEEDS ASSISTANCE
TOILETING: NEEDS ASSISTANCE
PATIENT'S MEMORY ADEQUATE TO SAFELY COMPLETE DAILY ACTIVITIES?: YES
BATHING: NEEDS ASSISTANCE
WALKS IN HOME: NEEDS ASSISTANCE
HEARING - LEFT EAR: FUNCTIONAL
JUDGMENT_ADEQUATE_SAFELY_COMPLETE_DAILY_ACTIVITIES: YES

## 2024-08-01 ASSESSMENT — ENCOUNTER SYMPTOMS
WHEEZING: 0
COUGH: 0
COLOR CHANGE: 0
PALPITATIONS: 0
BLOOD IN STOOL: 0
SHORTNESS OF BREATH: 0
BACK PAIN: 1

## 2024-08-01 ASSESSMENT — PATIENT HEALTH QUESTIONNAIRE - PHQ9
2. FEELING DOWN, DEPRESSED OR HOPELESS: NOT AT ALL
SUM OF ALL RESPONSES TO PHQ9 QUESTIONS 1 & 2: 0
1. LITTLE INTEREST OR PLEASURE IN DOING THINGS: NOT AT ALL

## 2024-08-01 ASSESSMENT — LIFESTYLE VARIABLES
HOW OFTEN DO YOU HAVE A DRINK CONTAINING ALCOHOL: PATIENT DECLINED
SKIP TO QUESTIONS 9-10: 0
AUDIT-C TOTAL SCORE: -1
AUDIT-C TOTAL SCORE: -1
HOW OFTEN DO YOU HAVE 6 OR MORE DRINKS ON ONE OCCASION: PATIENT DECLINED
HOW MANY STANDARD DRINKS CONTAINING ALCOHOL DO YOU HAVE ON A TYPICAL DAY: PATIENT DECLINED

## 2024-08-01 ASSESSMENT — PAIN - FUNCTIONAL ASSESSMENT: PAIN_FUNCTIONAL_ASSESSMENT: 0-10

## 2024-08-01 ASSESSMENT — PAIN SCALES - GENERAL
PAINLEVEL_OUTOF10: 0 - NO PAIN
PAINLEVEL_OUTOF10: 9

## 2024-08-01 ASSESSMENT — PAIN DESCRIPTION - LOCATION: LOCATION: ABDOMEN

## 2024-08-01 NOTE — HOSPITAL COURSE
58 year old Female with PMHx of HLD, HTN, depression, anxiety, who presented to  ED at the advice of her physician due to US findings of IVC and right renal vein thrombus. Patient reported several months of worsening, severe focal back pain, weakness, and imbalance. Initial imaging revealed intrahepatic IVC tumor thrombus, masses in the bilateral adrenal glands, T8 lytic lesion, and left upper lobe lung mass concerning for primary lung malignancy with metastasis.   8/2 Patient taken for bronchoscopy with biopsy by pulmonology.  Cytology and surgical pathology returned consistent with non-small cell lung carcinoma.   8/9 s/p T7-T9 lami, T8 bitranspedicular decompression for tumor debulking, T6-10 instrumented fusion (prelim: carcinoma)  8/10 uprights hardware in pos'n  8/12 Heparin drip started until therapeutic x48hrs per vasc med  8/14 heparin drip therapeutic and started on Eliquis BID. Hgb 6.7, s/p 1 unit PRBC with adequate improvement Hgb 8.1.    Outpatient follow up with Dr. Lisset White has been scheduled for 10/22 @ 2:30     Supportive oncology was consulted given the advanced nature of the patient's disease. New pain regimen was ordered and she noted improvement in control of her pain symptoms.     PT/OT eval recommend Low intensity therapy at time of discharge.   On the day of discharge, the patient was seen and evaluated by the neurosurgery team and deemed suitable for discharge.  There were no significant events overnight. Vitals were reviewed and within normal limits. Labs were stable at discharge. On day of discharge the patient was tolerating a diet, pain was controlled on PO pain medication, was ambulating well and voiding spontaneously. The patient was given detailed discharge instructions and were scheduled to follow up as an outpatient.

## 2024-08-01 NOTE — H&P
MEDICINE ADMISSION NOTE    History of Present Illness  Ms Veras is a 58 year old F, w/ PMHx of HLD, HTN, depression, anxiety, presents to  ED at the advice of her physician due to US findings of IVC and right renal vein thrombus.     At baseline patient without any functional deficits. She was in her usual state of health until March of this year, at which at that time reports the passing of her  due to colon cancer. She reports her depression worsening since and not taking care of herself very well. Although patient has history of chronic back pain, she recalls developing sharp, stabbing upper back pain in early June that radiated bilaterally, she rated her pain 10/10 at that time. Her back pain progressively worsened, and she could no longer  her laundry basket. Furthermore she noticed around the same time some upper extremity weakness, and fatigue. Patient then presented to her PCP on july 25th due to worsening of her back pain, lumbar XR at the time showed T8 compression fracture and rounded mass-like opacity projecting in the left lower thorax. Following the thoracic XR finding, complete workup obtained, followed by BL Renal US given elevated Cr.     One week prior to presentation, she reports SOB, and her balance being off, describes sensation as “falling sensation or missing a step.” These episodes occurred periodically, and several times per day at its worse. One day prior to presentation, she reports acute onset abdominal pain, predominantly epigastric that was accompanied by nausea. She reports emesis today, however denies any changes in bowel habits. She denies any  symptoms.     Patient denies any chest pain, palpitations, hoarseness, or any dysphagia symptoms. She further denies cough, fever, but does report night sweats for the past 2 months. She also reports unintended weight loss, decreased appetite, and feeling full after small meals; estimates 30lbs weigh loss since  "March.  Patient reports extensive tobacco use history, started smoking since she was 13yrs old, reports 1 pack per day and would only quit smoking when she was pregnant. She reports increased alcohol since her  passed away, roughly 14 beers per week. She denies illicit drug use or any other potential environmental exposures. She reports family history of lymphoma in her dad, otherwise denies other types of cancers. She denies any history of blood clots, and is currently not on anticoagulation.    In the ED, she was found to be hypotensive (88/55), tachycardic (106) and satting 100% on RA. She was given IV fluids, and zofran, and her BP improved. Initial labs significant for hypokalemia (2.7), hyponatremia (134), elevated lactate (2.7) ALP (188) leukocytosis (15.2), INR (1.5) and ANTONY  (baseline unclear,Cr 1.49). she was given Kcl 40 mEq, and LR (2.5 L prior to arrival to the floor)    ED Course:  /63   Pulse 81   Temp 35.7 °C (96.2 °F) (Tympanic)   Resp (!) 22   Ht 1.676 m (5' 6\")   Wt 74.4 kg (164 lb)   SpO2 95%   BMI 26.47 kg/m²      Labs:  Results from last 7 days   Lab Units 07/31/24  1750   WBC AUTO x10*3/uL 15.2*   HEMOGLOBIN g/dL 10.3*   HEMATOCRIT % 31.9*   PLATELETS AUTO x10*3/uL 601*            Results from last 7 days   Lab Units 07/31/24  1750   SODIUM mmol/L 134*   POTASSIUM mmol/L 2.7*   CHLORIDE mmol/L 94*   CO2 mmol/L 26   BUN mg/dL 18   CREATININE mg/dL 1.49*   CALCIUM mg/dL 8.7     Results from last 7 days   Lab Units 07/31/24  1750   ALK PHOS U/L 188*   BILIRUBIN TOTAL mg/dL 0.8   PROTEIN TOTAL g/dL 7.3   ALT U/L 16   AST U/L 25      Results from last 7 days   Lab Units 07/31/24  1750   APTT seconds 29   INR  1.5*          ED Interventions:  -2.5 L LR   -Zofran, kcl 40 mEq  -heparin gtt started   -Vascular and neurosurgery consulted        Past Medical History  Past Medical History:   Diagnosis Date    Hyperlipidemia     Hypertension        Surgical History  History reviewed. No " pertinent surgical history.    Social History  She reports that she has been smoking cigarettes. She has never used smokeless tobacco. No history on file for alcohol use and drug use.    Family History  No family history on file.     Allergies  Patient has no known allergies.     Physical Exam   Physical Exam  Constitutional:       General: She is not in acute distress.     Appearance: Normal appearance. She is not ill-appearing.   HENT:      Head: Normocephalic and atraumatic.      Nose: Nose normal. No congestion or rhinorrhea.      Mouth/Throat:      Mouth: Mucous membranes are moist.      Pharynx: Oropharynx is clear.   Eyes:      General: No scleral icterus.     Extraocular Movements: Extraocular movements intact.      Conjunctiva/sclera: Conjunctivae normal.      Pupils: Pupils are equal, round, and reactive to light.   Cardiovascular:      Rate and Rhythm: Normal rate and regular rhythm.      Pulses: Normal pulses.      Heart sounds: Normal heart sounds. No murmur heard.  Pulmonary:      Effort: Pulmonary effort is normal. No respiratory distress.      Breath sounds: Normal breath sounds. No wheezing or rales.   Abdominal:      General: Bowel sounds are normal. There is no distension.      Palpations: Abdomen is soft.      Tenderness: There is abdominal tenderness. There is right CVA tenderness and left CVA tenderness. There is no guarding or rebound.      Comments: Epigastric, RUQ, LUQ and LLQ tenderness on exam   Musculoskeletal:         General: No swelling. Normal range of motion.      Cervical back: Normal range of motion and neck supple.      Right lower leg: No edema.      Left lower leg: No edema.   Skin:     General: Skin is warm and dry.      Capillary Refill: Capillary refill takes less than 2 seconds.      Coloration: Skin is not jaundiced.      Findings: No lesion or rash.   Neurological:      General: No focal deficit present.      Mental Status: She is alert and oriented to person, place, and  time.      Comments: No nystagmus or opthalmoplegia    Psychiatric:         Mood and Affect: Mood normal.         Behavior: Behavior normal.         Thought Content: Thought content normal.          Medications  Scheduled medications  atorvastatin, 40 mg, oral, Daily  citalopram, 20 mg, oral, Daily  hydrOXYzine HCL, 25 mg, oral, Daily  lactated Ringer's, 500 mL, intravenous, Once  polyethylene glycol, 17 g, oral, Daily      Continuous medications  heparin, 0-4,500 Units/hr, Last Rate: 1,300 Units/hr (08/01/24 0000)      PRN medications  PRN medications: acetaminophen, heparin     === 07/31/24 ===    US RIGHT UPPER QUADRANT    - Impression -  1. Right upper pole solid renal mass measuring 10.0 x 6.2 x 7.8 cm  likely renal neoplasm with likely involvement of the ipsilateral  renal vein. Adjacent IVC thrombus measuring 4.5 x 2.4 x 2.8 cm with  no definite intrinsic vascularity, still tumor thrombus is highly  suspected.  2. Wall adherent gallbladder nodules measuring up to 0.4 cm. Findings  could represent gallbladder polyps.    Recommendation:  Urology consult.  Renal MRI with IV contrast for further assessment of the right renal  likely neoplastic process and right renal vein/IVC thrombus.    The significant results of the study were relayed by me to and  acknowledged by Louann Baltazar CNP on 07/31/2024 at 3:50 p.m. over the  phone.    MACRO:  Critical Finding:  See findings. Notification was initiated on  7/31/2024 at 3:44 pm by  Hemanth Felton.  (**-OCF-**) Instructions:    Signed by: Hemanth Felton 7/31/2024 3:52 PM  Dictation workstation:   [id number]  === 07/31/24 ===    CT ABDOMEN PELVIS W IV CONTRAST    - Impression -  1. Bilateral adrenal soft tissue masses with central necrosis  concerning for metastatic lesions. The large adrenal mass exerts some  mass effect on the adjacent kidney. There is tumor thrombus extending  from the right adrenal into the intrahepatic IVC with some superior  extension to the level of  hepatic IVC.  2. Several soft tissue peritoneal deposits involving the pararenal  space and retroperitoneum as detailed above which are also suspicious  for metastatic disease.  3. Enlarged portacaval lymph nodes concerning for metastatic disease  involvement.  4. Lytic destructive lesion with mild pathologic compression  deformity of the T8 vertebral body without retropulsion. There is  ventral epidural tumor involvement at T7 and T8 with at least  moderate central canal stenosis at that level. Well-circumscribed  bone marrow lesion of the right iliac bone, which is nonspecific and  may be benign or malignant.  5. Several subcutaneous nodules are also suspicious for metastatic  disease.    Overall findings are suspicious for primary lung malignancy, with  other rare metastatic malignancy is considered less likely but not  excluded.    I personally reviewed the images/study and I agree with the findings  as stated by resident Rafael Richards. This study was interpreted  at [facility], [place], [place].    MACRO:  Rafael Richards discussed the significance and urgency of this  critical finding by telephone with  NENA VILLEGAS on 7/31/2024 at  9:30 pm.  (**-RCF-**) Findings:  See findings.    Signed by: Miguel Streeter 7/31/2024 10:35 PM  Dictation workstation:   [id number]       Assessment/Plan     Ms Veras is a 58 year old F, w/ PMHx of HLD, HTN, depression, anxiety, presents to  ED at the advice of her physician due to US findings of IVC and right renal vein thrombus. Found to have metastatic disease, unclear primary malignacy at this time given b/l adrenal masses. MRI Brain, CTL w/ wo iv con pending. Patient HDS, and on RA.      #Hypotension   -Likely 2/2 to dehydration. Responded well to fluids.   -BP 83/55 on presentation   Plan:  -Continue to monitor     #ANTONY, Non-Oliguric   #Hypokalemia   #Hyponatremia   Pt w/ history of decreased PO intake. Suspect etiology  pre-renal, however ATN cannot be excluded given hypotension   -K+ 2.7 on presentation , Na 134-->131  -Cr 1.49, now 1.21  -s/p 3L LR  Plan:  -Daily CMP  -avoid nephrotoxins   -renally dose medication   -UA and urine lytes, urine osms pending    #Lactic Acidosis-resolved  #Leukocytosis   -likely reactive, denies systemic signs of infection, afebrile and no evidence of infection seen on imaging.  -WBC 15.2 on admission   Plan:  -Continue to monitor  -Daily CBC    #Anemia   #Thrombocythemia   #Intrahepatic IVC Thrombus   -No evidence of overt GI bleed. Presentation likely iso of malignancy. Patient with no history of vascular disease or history of blood clots  Plan:  -Vascular medicine c/s, appreciate recs  -High intensity heparin gtt   -Consult urology in the AM     #L. Perihilar Mass Encasing L. Pulmonary Artery  #B/L Adrenal Soft Tissues Masses   #Enlarged Portocaval Lymph Nodes   CTPE: Left upper lobe suprahilar mass with invasion into the mediastinum question of upper left pericardium involvement. Encasement of the left upper lobe pulmonary arterial branches. Enlarged subcarinal lymph node.   -0.6 cm left upper lobe perifissural nodule and 0.6 cm right lower lobe pulmonary nodule.  -patient reports SOB, however no face/neck swelling, no distended veins, reports upper extremity weakness, no numbness or tingling.   -Extensive history of smoking, 1 pack a day since 13yrs old.  -Etiology: lung primary cancer favored w/ metastasis to the adrenals.  Plan:  -continue to monitor for signs of SVC syndrome   -Consult pulmonary for biopsy     #Right Iliac Osteolytic Lesions  #Ventral Epidural Lesion   #Pathologic T8 Compression Fracture   -history of chronic back pain, however since June reports sharp, stabbing pain w/ bilateral radiation.  - CT Abd/pel w/ IV con: ytic destructive lesion with mild pathologic compression deformity of the T8 vertebral body without retropulsion. There is  ventral epidural tumor involvement at  T7 and T8 with at least moderate central canal stenosis at that level.  -No FND, bladder or bowel dysfunction. Reports UE and LE weakness, progressive, no numbness or tingling.  Plan:  -Neurosurgery consulted, appreciate recs   -MRI Brain, cervical, thorax and lumbar w/ wo IV con pending   -Neuro checks Q4H     #HTN  #HLD   -Holding home HTN meds iso hypotension   -C/w Atorvastatin 40 mg once daily    #Depression   #Anxiety   -C/w home citalopram and hydroxyzine     F: PRN  E: PRN  N:   Dietary Orders (From admission, onward)       Start     Ordered    07/31/24 2315  NPO Diet; Effective now  Diet effective now         07/31/24 2314                  A: pIV  DVT: Heparin gtt  GI: not indicated     Bowel Regimen: Miralax  Code Status: Full code (confirmed on admission)  NOK: Adan Veras (son): [phone]      Not yet staffed with the attending.  Savannah Young MD  PGY-2 Internal Medicine

## 2024-08-01 NOTE — PROGRESS NOTES
Pharmacy Medication History Review    Kassandra Veras is a 58 y.o. female admitted for Hypokalemia. Pharmacy reviewed the patient's [id number] medications and allergies for accuracy.    The list below reflects the updated PTA list.   Prior to Admission Medications   Prescriptions Last Dose Patient / Chart Reported?   atorvastatin (Lipitor) 40 mg tablet 7/30/2024 Yes   Sig: Take 1 tablet (40 mg) by mouth once daily   in the evening.   citalopram (CeleXA) 20 mg tablet 7/30/2024 Yes   Sig: Take 1 tablet (20 mg) by mouth once daily   in the evening.   --> Patient confirms taking 1 tablet daily, see comments below.   hydrOXYzine HCL (Atarax) 25 mg tablet 7/31/2024 Yes   Sig: Take 1 tablet (25 mg) by mouth once daily   in the morning. (Anxiety)  --> Patient states taking this every day in AM.   lisinopriL-hydrochlorothiazide 10-12.5 mg tablet 7/31/2024 Yes   Sig: Take 1 tablet by mouth once daily   in the morning.   potassium chloride CR 10 mEq ER tablet 7/30/2024 Yes   Sig: Take 1 tablet (10 mEq) by mouth once daily   in the morning.  --> Just started 7/25/24. Patient states missed today's dose (7/31).              The list below reflects the updated allergy list. Please review each documented allergy for additional clarification and justification.  Allergies  Reviewed by Faby Padron RN on 7/31/2024   No Known Allergies         Patient accepts M2B at discharge.   Local Pharmacy if Needed:  [facility] [place], [place]   [phone]    Sources used to complete the med history include:  Patient Interview (Awake/alert; good historian; able to recognize/confirm/clarify medications using name prompting from sources below.)  Epic Dispense Report (Pharmacy Fill History)  [facility], Clinical Summary (Active Medications)  [facility] - 7/22/24 Office Visit, 7/25/24 Telephone [employer]  [facility] (no recent history found)    Additional Comments:  Citalopram: Patient was recently encouraged by provider (per [facility]  [facility]), to increase citalopram 20 mg to 1.5 tablets (from 1 tablet). She states she continues to just take just 1 tablet daily in evening. (Admits tablet hard to break in half).  Amlodipine: History of amlodipine 10 mg daily; due to asymptomatic hypotension (per 7/[facility]), was discontinued. Patient confirms not taking; last dose ~ 1.5 weeks ago. Removed from PTA List.  Cipro: Recent course of ciprofloxacin 500 mg BID 7 days (filled 7/22/24); pt states completed.  History of chronic back pain; was encouraged by provider (per 7/[facility]) to take ibuprofen; patient states tried but it \"doesn't help at all.\" Denies any other OTC/vitamin/supplement use.    ------------------------------  Sin Castro, Jia, [facility]  Transitions of Care Pharmacist  Medication reconciliation complete  Please reach out via [employer] Secure Chat for questions,   or if no response call [employer] or [employer].  [facility]   "

## 2024-08-01 NOTE — PROGRESS NOTES
"Kassandra Veras is a 58 y.o. female on day 1 of admission presenting with Hypokalemia.    Subjective   No events overnight    Objective     Physical Exam  AOx3  RUE D5 B5 T4+ HG/IO 4+  RLE HF4+ KE5 DF/PF 5  LUE D5 B5 T5 HG/IO 5  LLE HF4+ KE5 DF/PF 5  SILT    Last Recorded Vitals  Blood pressure 91/75, pulse 74, temperature 35.7 °C (96.2 °F), temperature source Tympanic, resp. rate 18, height 1.676 m (5' 6\"), weight 74.4 kg (164 lb), SpO2 95%.  Intake/Output last 3 Shifts:  I/O last 3 completed shifts:  In: 1998 (26.9 mL/kg) [IV Piggyback:1998]  Out: - (0 mL/kg)   Dosing Weight: 74.4 kg     Relevant Results  Lab Results   Component Value Date    WBC 12.8 (H) 08/01/2024    HGB 8.2 (L) 08/01/2024    HCT 25.3 (L) 08/01/2024    MCV 83 08/01/2024     (H) 08/01/2024     Lab Results   Component Value Date    GLUCOSE 97 08/01/2024    CALCIUM 7.9 (L) 08/01/2024     (L) 08/01/2024    K 2.9 (LL) 08/01/2024    CO2 25 08/01/2024    CL 97 (L) 08/01/2024    BUN 19 08/01/2024    CREATININE 1.39 (H) 08/01/2024       Assessment/Plan   Principal Problem:    Hypokalemia    Kassandra is a 58 y.o. female with h/o HTN, HLD, depression, anxiety p/w hypotension and tachycardia, RUQ US IVC thrombus, R renal mass, CT PE L perihilar mass encasing L pulmonary artery, CT AP b/l adrenal masses, R adrenal mass w/ extension into infrahepatic IVC, T8 compression fracture w 50% height loss, ventral epidural lesion, R iliac osteolytic lesion     [facility] team primary   MRI brain, cervical, thoracic, and lumbar spine  Further recs pending imaging  Ok for hep gtt   SCDs, ok for dvt ppx  Rest per primary   Will continue to follow             Amrik Tamayo MD      "

## 2024-08-01 NOTE — CONSULTS
Vascular Surgery CONSULT NOTE    Assessment/Plan   Kassandra Veras is 58 y.o. female with history of HTN, HLD, Depresion who presented to outside hospital with abdominal pain that had been ongoing for approximately one week. At the outside hospital a right upper quadrant ultrasound was obtained and showed a thrombus in the right renal vein as well as the IVC. Patient has no history of vascular disease and no history of blood clots.    Plan:  High intensity heparin drip  Recommend medicine admission and urology consult  Vascular medicine consult  Urology consultation  Complete malignancy work up    D/w chief resident Dr. Méndez and attending, Dr. Carla Gunderson MD  General Surgery  Vascular [id number]    Subjective   HPI:  Kassandra Veras is 58 y.o. female with history of HTN, HLD, Depresion who presented to outside hospital with abdominal pain that had been ongoing for approximately one week. She describes the pain as constant slowly worsening over the last few days with occasional scabbing pain. She has had some nausea and vomiting today, she is still passing flatus and bowel movements. At the outside hospital a right upper quadrant ultrasound was obtained and showed a thrombus in the right renal vein as well as the IVC. Patient has no history of vascular disease and no history of blood clots. Patient ambulates independently without a walker and denies any claudication symptoms.    Vascular History:  No vascular history    PMH: hypertension, hyperlipidemia, depression    PSH: hysterectomy    Home Meds:  No current facility-administered medications on file prior to encounter.     Current Outpatient Medications on File Prior to Encounter   Medication Sig Dispense Refill    amLODIPine (Norvasc) 10 mg tablet Take 1 tablet (10 mg) by mouth once daily.      citalopram (CeleXA) 20 mg tablet Take 1 tablet (20 mg) by mouth once daily.      potassium chloride CR 10 mEq ER tablet Take 1 tablet (10 mEq) by mouth once  "daily.      atorvastatin (Lipitor) 40 mg tablet Take 1 tablet (40 mg) by mouth once daily.      hydrOXYzine HCL (Atarax) 25 mg tablet Take 1 tablet (25 mg) by mouth once daily.      lisinopriL-hydrochlorothiazide 10-12.5 mg tablet Take 1 tablet by mouth once daily.          Allergies:  No Known Allergies    SH/FH: noncontributory    ROS: 12 system negative except HPI    Objective   Vitals:  Heart Rate:  []   Temperature:  [35.7 °C (96.2 °F)-36.5 °C (97.7 °F)]   Respirations:  [17-30]   BP: ()/(43-77)   Height:  [167.6 cm (5' 6\")]   Weight:  [74.4 kg (164 lb)]   Pulse Ox:  [92 %-100 %]     Exam:  Constitutional: No acute distress, resting comfortably  Neuro:  AOx3, grossly intact  ENMT: moist mucous membranes  Head/neck: atraumatic  CV: no tachycardia  Pulm: non-labored on room air  GI: soft, non-distended, tender to palpation of R flank  Skin: warm and dry  Musculoskeletal: moving all extremities  Extremities: palpable bilateral radial, femoral, DP, and PT pulses    Labs:  Results from last 7 days   Lab Units 07/31/24  1750   WBC AUTO x10*3/uL 15.2*   HEMOGLOBIN g/dL 10.3*   PLATELETS AUTO x10*3/uL 601*      Results from last 7 days   Lab Units 07/31/24  1750   SODIUM mmol/L 134*   POTASSIUM mmol/L 2.7*   CHLORIDE mmol/L 94*   CO2 mmol/L 26   BUN mg/dL 18   CREATININE mg/dL 1.49*   GLUCOSE mg/dL 121*   MAGNESIUM mg/dL 2.25      Results from last 7 days   Lab Units 07/31/24  1750   INR  1.5*   PROTIME seconds 17.1*   APTT seconds 29           Imaging:  Reviewed independently by vascular team:  CT AP   IMPRESSION:  1. Bilateral adrenal soft tissue masses with central necrosis  concerning for metastatic lesions abutting the superior pole of the  bilateral kidneys as detailed above. Right adrenal mass exhibits mass  effect/effacement of the inferior medial aspect of the right hepatic  lobe with soft tissue mass extension of estimated 4.4 superior  extension into the infrahepatic IVC. The left adrenal mass " effaces  the splenic artery.  2. Nonspecific soft tissue peritoneal deposits involving the  pararenal space and retroperitoneum as detailed above which also  could reflect metastatic disease.  3. Enlarged portacaval lymph nodes concerning for metastatic disease  involvement.  4. Osteolytic lesions including pathologic compression deformity of  the T8 vertebral body with estimated 50% height loss and  well-circumscribed osteolytic lesion of the right iliac bone.  5. Please note the conglomeration of the findings in addition to the  findings of left perihilar mass could reflect underlying primary lung  malignancy. Recommend PET-CT/tissue sampling for further diagnosis.      Vascular Surgery Attending Note    This patient was seen and evaluated. A care plan was developed with the resident/NP team. I have personally reviewed the available clinical information.     I agree with the assessment as outlined in the resident/NP note.     Steven Aguirre MD, PhD  Clinical   [employer]  Co-Director, Aortic Center  [facility] [place]

## 2024-08-01 NOTE — CONSULTS
Inpatient consult to Vascular Medicine  Consult performed by: Estefania Person MD  Consult ordered by: Martín Eckert MD  Reason for consult: Renal mass invading into the IVC          History Of Present Illness  Kassandra Veras is a 58 y.o. female with significant history of hypertension, hyperlipidemia presenting with IVC thrombus.  She reports that for about a week she was experiencing abdominal pain associated with nausea and vomiting and progressive worsening of back pain.  She was seen by her PCP and x-ray and right upper quadrant ultrasound were ordered.    Right upper quadrant ultrasound was significant for:    IMPRESSION:  1. Right upper pole solid renal mass measuring 10.0 x 6.2 x 7.8 cm  likely renal neoplasm with likely involvement of the ipsilateral  renal vein. Adjacent IVC thrombus measuring 4.5 x 2.4 x 2.8 cm with no definite intrinsic vascularity, still tumor thrombus is highly  suspected.  2. Wall adherent gallbladder nodules measuring up to 0.4 cm. Findings could represent gallbladder polyps.    She was advised to come to [facility] for further evaluation.  On admission CT PE was obtained which did not show any evidence of pulmonary embolism however she was noted to have a left suprahilar mass with questionable left upper pericardium involvement.  IMPRESSION:  1. No evidence of acute pulmonary embolism.  2. Left upper lobe suprahilar mass with invasion into the mediastinum and question of upper left pericardium involvement. Encasement of the left upper lobe pulmonary arterial branches.  3. Enlarged subcarinal lymph node.  4. Small left-sided pleural effusion.  5. 0.6 cm left upper lobe perifissural nodule and 0.6 cm right lower lobe pulmonary nodule. These are nonspecific.  6. Lytic destructive bone marrow lesion within the T8 vertebral body. Ventral epidural tumor at the level of T7-T8 better visualized on the concurrent CT of abdomen/pelvis.  7. Nonspecific subcutaneous 0.8 cm nodule in the left upper  anterior chest wall.  8. Findings in the abdomen/pelvis report separately.      CT abdomen pelvis showed tumor thrombus extending from right adrenal into intrahepatic IVC with some superior extension to the level of hepatic IVC.  There are also lytic destruction and pathologic compression of T8 vertebral body.    Of note she reports that she recently lost her  in March 2024 and since then she has not been taking care of herself.  She has had about 30 pounds weight loss in the past 6 months.    SHE DENIES AND PERSONAL OR FH OF VTE.    Past Medical History  She has a past medical history of Hyperlipidemia and Hypertension.  Hypertension  Hyperlipidemia  Depression  No prior history of VTE    She is not up-to-date with her cancer screening  She has never had a colonoscopy  Her mammogram was few years ago  She has not had a lung cancer screening    Surgical History  She has no past surgical history on file.  Hysterectomy in 2014    Social History  She reports that she has been smoking cigarettes. She has never used smokeless tobacco. No history on file for alcohol use and drug use.  Smokes 1 pack/day since age 13 years old  Drinks 14 beers per week  Previously smoked marijuana.  Currently denies any illicit drug use    Family History  No family history on file.  Father had history of lymphoma  Mother had DVT and was associated with traveling  3 CHILDREN  Allergies  Patient has no known allergies.    Review of Systems  Review of Systems   Respiratory:  Negative for cough, shortness of breath and wheezing.    Cardiovascular:  Negative for chest pain, palpitations and leg swelling.   Gastrointestinal:  Negative for blood in stool.   Genitourinary:         Has noted darker colored urine   Musculoskeletal:  Positive for back pain (thoracic back pain).   Skin:  Negative for color change.   All other systems reviewed and are negative.  +WEIGH LOSS         Physical Exam    /69   Pulse 86   Temp 35.3 °C (95.5 °F)   " Resp 18   Ht 1.676 m (5' 6\")   Wt 74.4 kg (164 lb)   SpO2 98%   BMI 26.47 kg/m²   General appearance: alert and oriented, in no acute distress  Lungs: clear to auscultation bilaterally  Heart: regular rate and rhythm and S1, S2 normal  Abdomen: normal findings: soft, non-tender  Extremities: no edema, redness or tenderness in the calves or thighs  Pulses: 2+ and symmetric  Skin: warm and dry  Neurologic: Grossly normal         Last Recorded Vitals  BP 89/69 (BP Location: Right arm, Patient Position: Lying)   Pulse 89   Temp 37.1 °C (98.7 °F)   Resp (!) 29   Wt 74.4 kg (164 lb)   SpO2 95%     Relevant Results  IMPRESSION:  MRI BRAIN:  1. No evidence of acute infarct, intracranial mass effect or midline  shift.  2. No evidence of intracranial metastatic disease within the  limitation of patient motion artifact.  3. There is a T2 hyperintense peripherally enhancing nodule within the left parotid gland measuring 1 cm which may represent a cystic or necrotic lymph node versus a primary parotid lesion.      Cervical spine:      1. Heterogeneous bone marrow signal throughout the cervical spine  without definite evidence of a focal bone marrow replacing lesion.  2.   No abnormal signal or enhancement within the cervical cord.  3.   Mild degenerative changes without significant spinal canal  stenosis. Neural foraminal narrowing as detailed above. No cervical  cord compression.  4.   Borderline level 2 lymph nodes are nonspecific.          Thoracic spine:  1. Near-complete marrow replacement of the T8 vertebral body with avid enhancement and acute compression deformity with approximately 30% vertebral body height loss, findings are compatible with osseous metastatic disease involvement with superimposed pathologic fracture. There is extraosseous tumor at the T8 level within the anterior and lateral epidural space resulting in severe spinal canal stenosis and compression of the thoracic cord. There is subtle " increased cord signal. The epidural tumor extends along the posterior T7 vertebral body with mild-to-moderate spinal canal stenosis. There is extraosseous tumor within the neural foramen at T7-T8 and T8-T9 with  mild neural foraminal narrowing.  2. There is diffusely heterogeneous signal throughout the vertebral bodies of the thoracic spine. There are multiple indeterminate subcentimeter T1 hypointense foci within the T6, T7, T10 and T11 vertebral bodies. Consider short-term interval follow-up for further evaluation.  3. Numerous left suprahilar, right renal, and adrenal gland lesions  are partially visualized, better evaluated on recent CT abdomen  pelvis dated 07/31/2024.      Lumbar spine:          1. Diffusely heterogeneous bone marrow signal without a definite  focal lesion.  2. Degenerative changes without significant spinal canal stenosis. No compression of the distal cord or nerve roots.  3. No abnormal signal or enhancement within the distal cord or nerve roots.           Assessment/Plan   58-year-old female with history of hypertension, hyperlipidemia, active smoker admitted to the hospital with IVC thrombus, right renal mass with extension to IVC, lytic bone lesions and suprahilar lung mass with possible pericardial involvement.  Vascular medicine has been consulted for further recommendations on anticoagulation of IVC thrombus.  Pulmonology is also on board for bronc and tissue biopsy tomorrow.  She is currently on UFH gtt which is reasonable however we need to further characterize the ED findings and whether this is a pleasant BLAND tumor versus tumor plus thrombus.  Ideally a PET scan would be helpful in this case.  Please obtain an echocardiogram to further evaluate for pericardial involvement.  If there is pericardial involvement she remains at high risk for bleed especially given the malignancy diagnosis.  In addition if this is a renal cell carcinoma in origin that is also high risk for bleed.   Please also obtain a venous ultrasound in the vascular lab to look for DVTs.    Discussed with  attending Dr. Reynoso.      Estefania Person MD   Fellow     I saw and evaluated the patient. I personally obtained the key and critical portions of the history and physical exam or was physically present for key and critical portions performed by the resident/fellow. I reviewed the resident/fellow's documentation and discussed the patient with the resident/fellow. I agree with the resident/fellow's medical decision making as documented in the note with the exception/addition of the following:    Lyssa Reynoso MD      HX AS NOTED  IMAGING REVIEWED.    UNCLEAR IF THIS IS A LUNG PRIMARY OR ?RENAL PRIMARY.    AGREE WITH THE PERICARDIAL INVOLVEMENT AND C/F INCREASED RISK OF BLEEDING    OK FOR UFH GTT NOW.    CURRENTLY NO PE   US PENDING FOR THE LE.     following  Please page [phone] for any concerns    Lyssa Reynoso MD

## 2024-08-01 NOTE — H&P (VIEW-ONLY)
Inpatient consult to Neurosurgery  Consult performed by: Amrik Tamayo MD  Consult ordered by: Steffen Simerlink, MD          Date of Service:  7/31/2024 Attending Provider:  Steffen Simerlink, MD     Reason for Consultation:  Kassandra Veras is being seen today for a consult requested by Steffen Simerlink, MD for thoracic compression fracture.      Subjective   History of Present Illness:  Kassandra is a 58 y.o. female with h/o HTN, HLD, depression, anxiety p/w hypotension and tachycardia, RUQ US IVC thrombus, R renal mass, CT PE L perihilar mass encasing L pulmonary artery, CT b/l adrenal masses, R adrenal mass w/ extension into infrahepatic IVC, T8 compression fracture w 50% height loss, R iliac osteolytic lesion    Patient was seeing PCP for evaluation of abdominal pain and got RUQ ultrasound. Has had constant back pain for 2 weeks. Has had back pain since 2013. Not taking any AP or AC. Denies confusion, headache, double vision, blurry vision, n/v, numbness, weakness, tingling.    Review of Systems   10 point ROS is obtained and negative except the ones mentioned in the HPI    Objective     Vitals:  Vitals:    07/31/24 2000   BP: 81/63   Pulse: 86   Resp: (!) 30   Temp: 35.7 °C (96.2 °F)   SpO2: (!) 92%         Exam:  Constitutional: No acute distress  Resp: breathing comfortably  Cardio: well perfused  GI: nondistended  MSK: full range of motion  Neuro: A&Ox3  Cranial Nerves II-XII: OU2R, EOMI, Face symmetric, Facial SILT, Palate/Tongue midline and symmetric, shoulder shrugs symmetric, hearing intact to finger rubs bilaterally  Motor:   RUE D5 B5 T4+ HG/IO 4+  RLE HF4+ KE5 DF/PF 5  LUE D5 B5 T5 HG/IO 5  LLE HF4+ KE5 DF/PF 5  Sensation: SILT throughout all extremities  1+ patellar reflex, b/l camacho, no clonus  Psych: appropriate mood  Skin: no obvious lesions    Medical History  Past Medical History:   Diagnosis Date    Hyperlipidemia     Hypertension        Surgical History  History reviewed. No pertinent surgical  history.     Medications  Current Outpatient Medications   Medication Instructions    amLODIPine (Norvasc) 10 mg tablet 1 tablet, oral, Daily    atorvastatin (LIPITOR) 40 mg, oral, Daily    citalopram (CELEXA) 20 mg, oral, Daily    hydrOXYzine HCL (ATARAX) 25 mg, oral, Daily    lisinopriL-hydrochlorothiazide 10-12.5 mg tablet 1 tablet, oral, Daily    potassium chloride CR 10 mEq ER tablet 10 mEq, oral, Daily RT        Diagnostic Results:    Lab Results   Component Value Date    WBC 15.2 (H) 07/31/2024    HGB 10.3 (L) 07/31/2024    HCT 31.9 (L) 07/31/2024    MCV 81 07/31/2024     (H) 07/31/2024     Lab Results   Component Value Date    CREATININE 1.49 (H) 07/31/2024    BUN 18 07/31/2024     (L) 07/31/2024    K 2.7 (LL) 07/31/2024    CL 94 (L) 07/31/2024    CO2 26 07/31/2024     Lab Results   Component Value Date    INR 1.5 (H) 07/31/2024    PROTIME 17.1 (H) 07/31/2024       === 07/31/24 ===    CT ABDOMEN PELVIS W IV CONTRAST ([facility])  This result has not been signed. Information might be incomplete.    - Impression -  1. Bilateral adrenal soft tissue masses with central necrosis  concerning for metastatic lesions abutting the superior pole of the  bilateral kidneys as detailed above. Right adrenal mass exhibits mass  effect/effacement of the inferior medial aspect of the right hepatic  lobe with soft tissue mass extension of estimated 4.4 superior  extension into the infrahepatic IVC. The left adrenal mass effaces  the splenic artery.  2. Nonspecific soft tissue peritoneal deposits involving the  pararenal space and retroperitoneum as detailed above which also  could reflect metastatic disease.  3. Enlarged portacaval lymph nodes concerning for metastatic disease  involvement.  4. Osteolytic lesions including pathologic compression deformity of  the T8 vertebral body with estimated 50% height loss and  well-circumscribed osteolytic lesion of the right iliac bone.  5. Please note the conglomeration of  the findings in addition to the  findings of left perihilar mass could reflect underlying primary lung  malignancy. Recommend PET-CT/tissue sampling for further diagnosis.    I personally reviewed the images/study and I agree with the findings  as stated by resident Rafael Richards. This study was interpreted  at [facility], [place], [place].    MACRO:  Rafael Richards discussed the significance and urgency of this  critical finding by telephone with  NENA VILLEGAS on 7/31/2024 at  9:30 pm.  (**-RCF-**) Findings:  See findings.      Dictation workstation:   [id number]        Assessment/Plan   Assessment:  Kassandra is a 58 y.o. female with h/o HTN, HLD, depression, anxiety p/w hypotension and tachycardia, RUQ US IVC thrombus, R renal mass, CT PE L perihilar mass encasing L pulmonary artery, CT AP b/l adrenal masses, R adrenal mass w/ extension into infrahepatic IVC, T8 compression fracture w 50% height loss, ventral epidural lesion, R iliac osteolytic lesion    Plan:  [facility] team primary  MRI brain, cervical, thoracic and lumbar spine w/wo contrast for metastatic work up  Vasc surg recs   Obtain CBC, RFP, coag, T&S, UA, EKG, CXR  Further recs pending imaging    Amrik Tamayo MD   Plan not finalized until note signed by attending

## 2024-08-01 NOTE — CONSULTS
Reason For Consult  Left hilar mass.    History Of Present Illness  Kassandra Veras is a 58 y.o. female who is an active smoker (less than a pack per day for many many years) presenting with IVC thrombus.  Patient was found to have left hilar mass with adrenal masses concerning for metastatic lung disease.  Pulmonary team was consulted for bronchoscopy for tissue biopsy.    This afternoon, patient mentioned that she never had medical problems in the past, but she has been having chronic back pain that has been getting worse for a few months, and recently she started to have abdominal pain and nausea.  She denies chest pain, lower extremity pain.    She is an active smoker.  No family history of cancer.     Past Medical History  She has a past medical history of Hyperlipidemia and Hypertension.    Surgical History  She has no past surgical history on file.     Social History  She reports that she has been smoking cigarettes. She has never used smokeless tobacco. No history on file for alcohol use and drug use.    Family History  No family history on file.     Allergies  Patient has no known allergies.    Review of Systems  As above.      Physical Exam  Constitutional:       General: She is not in acute distress.     Appearance: She is obese. She is not ill-appearing.   HENT:      Mouth/Throat:      Mouth: Mucous membranes are moist.      Pharynx: Oropharynx is clear. No oropharyngeal exudate.   Cardiovascular:      Rate and Rhythm: Normal rate and regular rhythm.      Pulses: Normal pulses.      Heart sounds: No murmur heard.  Pulmonary:      Effort: Pulmonary effort is normal. No respiratory distress.      Breath sounds: Normal breath sounds. No stridor. No wheezing.   Abdominal:      General: There is no distension.      Palpations: Abdomen is soft.      Tenderness: There is no abdominal tenderness.   Musculoskeletal:         General: No swelling or tenderness.   Skin:     General: Skin is warm and dry.   Neurological:  "     General: No focal deficit present.      Mental Status: She is alert.   Psychiatric:         Mood and Affect: Mood normal.         Last Recorded Vitals  Blood pressure 103/61, pulse 90, temperature 37.1 °C (98.7 °F), resp. rate 16, height 1.676 m (5' 6\"), weight 74.4 kg (164 lb), SpO2 94%.    Relevant Results  CT A/P 7/31:   IMPRESSION:  1. Bilateral adrenal soft tissue masses with central necrosis  concerning for metastatic lesions. The large adrenal mass exerts some  mass effect on the adjacent kidney. There is tumor thrombus extending  from the right adrenal into the intrahepatic IVC with some superior  extension to the level of hepatic IVC.  2. Several soft tissue peritoneal deposits involving the pararenal  space and retroperitoneum as detailed above which are also suspicious  for metastatic disease.  3. Enlarged portacaval lymph nodes concerning for metastatic disease  involvement.  4. Lytic destructive lesion with mild pathologic compression  deformity of the T8 vertebral body without retropulsion. There is  ventral epidural tumor involvement at T7 and T8 with at least  moderate central canal stenosis at that level. Well-circumscribed  bone marrow lesion of the right iliac bone, which is nonspecific and  may be benign or malignant.  5. Several subcutaneous nodules are also suspicious for metastatic  disease.      CT chest 7/31:  IMPRESSION:  1. No evidence of acute pulmonary embolism.  2. Left upper lobe suprahilar mass with invasion into the mediastinum  and question of upper left pericardium involvement. Encasement of the  left upper lobe pulmonary arterial branches.  3. Enlarged subcarinal lymph node.  4. Small left-sided pleural effusion.  5. 0.6 cm left upper lobe perifissural nodule and 0.6 cm right lower  lobe pulmonary nodule. These are nonspecific.  6. Lytic destructive bone marrow lesion within the T8 vertebral body.  Ventral epidural tumor at the level of T7-T8 better visualized on " the  concurrent CT of abdomen/pelvis.  7. Nonspecific subcutaneous 0.8 cm nodule in the left upper anterior  chest wall.  8. Findings in the abdomen/pelvis report separately.    Assessment/Plan   Kassandra Veras is a 58 y.o. female who is an active smoker (less than a pack per day for many many years) presenting with IVC thrombus.  Patient was found to have left hilar mass with adrenal masses concerning for metastatic lung disease.  Pulmonary team was consulted for bronchoscopy for tissue biopsy.    #Left hilar mass:  #multiple finding of CT scan suspicious for metastasis:  Patient with large left hilar mass with multiple findings on CT scan to suggest metastatic process mostly primary lung malignancy. Patient is at risk with the history of extensive smoking. Patient has large bilateral adrenal masses, she is at risk of adrenal insufficiency.   Patient is on heparin gtt for IVC thrombus.    Recommendations:   Will add her on for bronch for tissue biopsy for tomorrow 8/2/2024.  Please keep the patient NPO after midnight.  Please hold heparin gtt in the morning if safe from primary team stand point.  AI work up per primary team.    Case was seen and examined with Dr. Reese.  Pulmonary team will continue to follow.       Sirena Teixeira MD

## 2024-08-01 NOTE — CONSULTS
Inpatient consult to Neurosurgery  Consult performed by: Amrik Tamayo MD  Consult ordered by: Steffen Simerlink, MD          Date of Service:  7/31/2024 Attending Provider:  Steffen Simerlink, MD     Reason for Consultation:  Kassandra Veras is being seen today for a consult requested by Steffen Simerlink, MD for thoracic compression fracture.      Subjective   History of Present Illness:  Kassandra is a 58 y.o. female with h/o HTN, HLD, depression, anxiety p/w hypotension and tachycardia, RUQ US IVC thrombus, R renal mass, CT PE L perihilar mass encasing L pulmonary artery, CT b/l adrenal masses, R adrenal mass w/ extension into infrahepatic IVC, T8 compression fracture w 50% height loss, R iliac osteolytic lesion    Patient was seeing PCP for evaluation of abdominal pain and got RUQ ultrasound. Has had constant back pain for 2 weeks. Has had back pain since 2013. Not taking any AP or AC. Denies confusion, headache, double vision, blurry vision, n/v, numbness, weakness, tingling.    Review of Systems   10 point ROS is obtained and negative except the ones mentioned in the HPI    Objective     Vitals:  Vitals:    07/31/24 2000   BP: 81/63   Pulse: 86   Resp: (!) 30   Temp: 35.7 °C (96.2 °F)   SpO2: (!) 92%         Exam:  Constitutional: No acute distress  Resp: breathing comfortably  Cardio: well perfused  GI: nondistended  MSK: full range of motion  Neuro: A&Ox3  Cranial Nerves II-XII: OU2R, EOMI, Face symmetric, Facial SILT, Palate/Tongue midline and symmetric, shoulder shrugs symmetric, hearing intact to finger rubs bilaterally  Motor:   RUE D5 B5 T4+ HG/IO 4+  RLE HF4+ KE5 DF/PF 5  LUE D5 B5 T5 HG/IO 5  LLE HF4+ KE5 DF/PF 5  Sensation: SILT throughout all extremities  1+ patellar reflex, b/l camacho, no clonus  Psych: appropriate mood  Skin: no obvious lesions    Medical History  Past Medical History:   Diagnosis Date    Hyperlipidemia     Hypertension        Surgical History  History reviewed. No pertinent surgical  history.     Medications  Current Outpatient Medications   Medication Instructions    amLODIPine (Norvasc) 10 mg tablet 1 tablet, oral, Daily    atorvastatin (LIPITOR) 40 mg, oral, Daily    citalopram (CELEXA) 20 mg, oral, Daily    hydrOXYzine HCL (ATARAX) 25 mg, oral, Daily    lisinopriL-hydrochlorothiazide 10-12.5 mg tablet 1 tablet, oral, Daily    potassium chloride CR 10 mEq ER tablet 10 mEq, oral, Daily RT        Diagnostic Results:    Lab Results   Component Value Date    WBC 15.2 (H) 07/31/2024    HGB 10.3 (L) 07/31/2024    HCT 31.9 (L) 07/31/2024    MCV 81 07/31/2024     (H) 07/31/2024     Lab Results   Component Value Date    CREATININE 1.49 (H) 07/31/2024    BUN 18 07/31/2024     (L) 07/31/2024    K 2.7 (LL) 07/31/2024    CL 94 (L) 07/31/2024    CO2 26 07/31/2024     Lab Results   Component Value Date    INR 1.5 (H) 07/31/2024    PROTIME 17.1 (H) 07/31/2024       === 07/31/24 ===    CT ABDOMEN PELVIS W IV CONTRAST ([facility])  This result has not been signed. Information might be incomplete.    - Impression -  1. Bilateral adrenal soft tissue masses with central necrosis  concerning for metastatic lesions abutting the superior pole of the  bilateral kidneys as detailed above. Right adrenal mass exhibits mass  effect/effacement of the inferior medial aspect of the right hepatic  lobe with soft tissue mass extension of estimated 4.4 superior  extension into the infrahepatic IVC. The left adrenal mass effaces  the splenic artery.  2. Nonspecific soft tissue peritoneal deposits involving the  pararenal space and retroperitoneum as detailed above which also  could reflect metastatic disease.  3. Enlarged portacaval lymph nodes concerning for metastatic disease  involvement.  4. Osteolytic lesions including pathologic compression deformity of  the T8 vertebral body with estimated 50% height loss and  well-circumscribed osteolytic lesion of the right iliac bone.  5. Please note the conglomeration of  the findings in addition to the  findings of left perihilar mass could reflect underlying primary lung  malignancy. Recommend PET-CT/tissue sampling for further diagnosis.    I personally reviewed the images/study and I agree with the findings  as stated by resident Rafael Richards. This study was interpreted  at [facility], [place], [place].    MACRO:  Rafael Richards discussed the significance and urgency of this  critical finding by telephone with  NENA VILLEGAS on 7/31/2024 at  9:30 pm.  (**-RCF-**) Findings:  See findings.      Dictation workstation:   [id number]        Assessment/Plan   Assessment:  Kassandra is a 58 y.o. female with h/o HTN, HLD, depression, anxiety p/w hypotension and tachycardia, RUQ US IVC thrombus, R renal mass, CT PE L perihilar mass encasing L pulmonary artery, CT AP b/l adrenal masses, R adrenal mass w/ extension into infrahepatic IVC, T8 compression fracture w 50% height loss, ventral epidural lesion, R iliac osteolytic lesion    Plan:  [facility] team primary  MRI brain, cervical, thoracic and lumbar spine w/wo contrast for metastatic work up  Vasc surg recs   Obtain CBC, RFP, coag, T&S, UA, EKG, CXR  Further recs pending imaging    Amrik Tamayo MD   Plan not finalized until note signed by attending

## 2024-08-01 NOTE — SIGNIFICANT EVENT
Vascular Surgery Plan    Patient w/ incidentally found R renal vein thrombus and IVC thrombus in the setting of a new R renal mass. She initially presented with abdominal pain and had a RUQ US that demonstrated this findings, of which were re-demonstrated on CT imaging.    At this time, there is no vascular surgery intervention indicated at this time.    Recommendations:  - Vascular medicine consultation  - Urology consultation  - Heparin gtt  - Medicine admission  - Malignancy workup    Please page with any questions or concerns.    Miles Méndez MD  Vascular Surgery, PGY4  Team Pager: [phone]

## 2024-08-01 NOTE — SIGNIFICANT EVENT
"Kassandra Veras is a 58 y.o. female on hospital day 1 of admission presenting with Malignancy (Multi).    Subjective   Patient seen and evaluated after being admitted overnight. Reports significant tenderness and 9/10 pain in the back in the area of her known compression fracture. Denies any chest pain, shortness of breath, dysuria, incontinence.     We discussed her goals of care and at this time she would like to move forward with a pulmonology evaluation, but will defer the decision of pursuing treatment until she has had time to talk with her family.        Objective     Physical Exam  Vitals reviewed.   Constitutional:       General: She is awake. She is not in acute distress.     Appearance: Normal appearance.   Eyes:      Extraocular Movements: Extraocular movements intact.   Cardiovascular:      Rate and Rhythm: Normal rate and regular rhythm.      Heart sounds: Normal heart sounds. No murmur heard.  Pulmonary:      Effort: Pulmonary effort is normal. No respiratory distress.      Breath sounds: Normal breath sounds.   Abdominal:      General: Abdomen is flat. Bowel sounds are normal.      Palpations: Abdomen is soft.      Tenderness: There is no abdominal tenderness.   Musculoskeletal:      Comments: Significant tenderness to palpation of the thoracic spine. Intact motor and sensory function in all four extremities.    Skin:     General: Skin is warm and dry.   Neurological:      Mental Status: She is alert. Mental status is at baseline.   Psychiatric:         Behavior: Behavior is cooperative.         Last Recorded Vitals  Blood pressure 91/75, pulse 74, temperature 35.7 °C (96.2 °F), temperature source Tympanic, resp. rate 18, height 1.676 m (5' 6\"), weight 74.4 kg (164 lb), SpO2 95%.  Intake/Output last 24h:    Intake/Output Summary (Last 24 hours) at 8/1/2024 1139  Last data filed at 7/31/2024 2053  Gross per 24 hour   Intake 1998 ml   Output --   Net 1998 ml       Relevant Results  Results from last 72 " hours   Lab Units 08/01/24  0402 07/31/24  2312 07/31/24  1750   WBC AUTO x10*3/uL 12.8*  --  15.2*   HEMOGLOBIN g/dL 8.2*  --  10.3*   HEMATOCRIT % 25.3*  --  31.9*   PLATELETS AUTO x10*3/uL 537*  --  601*   INR  1.6*  --  1.5*   SODIUM mmol/L 133* 131* 134*   POTASSIUM mmol/L 2.9* 3.3* 2.7*   CHLORIDE mmol/L 97* 97* 94*   CO2 mmol/L 25 22 26   BUN mg/dL 19 18 18   CREATININE mg/dL 1.39* 1.29* 1.49*   GLUCOSE mg/dL 97 109* 121*   CALCIUM mg/dL 7.9* 8.0* 8.7   MAGNESIUM mg/dL 2.17  --  2.25   PHOSPHORUS mg/dL 4.8  --   --    ALBUMIN g/dL 2.4* 2.6* 2.8*   ALK PHOS U/L  --  154* 188*   ALT U/L  --  11 16   AST U/L  --  14 25   BILIRUBIN TOTAL mg/dL  --  0.6 0.8     Scheduled medications  atorvastatin, 40 mg, oral, Daily  citalopram, 20 mg, oral, Daily  desmopressin, 0.3 mcg/kg, intravenous, Once  hydrOXYzine HCL, 25 mg, oral, Daily  polyethylene glycol, 17 g, oral, Daily      Continuous medications  heparin, 0-4,500 Units/hr, Last Rate: 1,500 Units/hr (08/01/24 0830)      PRN medications  PRN medications: acetaminophen, heparin, HYDROmorphone         Assessment/Plan   Ms Veras is a 58 year old F, w/ PMHx of HLD, HTN, depression, anxiety, presents to  ED at the advice of her physician due to US findings of IVC and right renal vein thrombus. Imaging findings consistent with significant metastatic disease identified in adrenal glands, spine, and left upper lobe lung, unclear origin, however high suspicion for primary lung cancer.     #L. Perihilar Mass Encasing L. Pulmonary Artery  #B/L Adrenal Soft Tissues Masses   #Enlarged Portocaval Lymph Nodes   CTPE: Left upper lobe suprahilar mass with invasion into the mediastinum question of upper left pericardium involvement. Encasement of the left upper lobe pulmonary arterial branches. Enlarged subcarinal lymph node. 0.6 cm left upper lobe perifissural nodule and 0.6 cm right lower lobe pulmonary nodule.  -Extensive history of smoking, 1 pack a day since 13yrs  old.  -Etiology: lung primary cancer favored w/ metastasis to the adrenals.  Plan:  -Pulmonology consulted for possible biopsy - bronch scheduled for 8/2  -Ongoing goals of care discussion with patient and her family - now DNR/DNI status     #Right Iliac Osteolytic Lesions  #Ventral Epidural Lesion   #Pathologic T8 Compression Fracture   -history of chronic back pain, however since June reports sharp, stabbing pain w/ bilateral radiation.  - CT Abd/pel w/ IV con: lytic destructive lesion with mild pathologic compression deformity of the T8 vertebral body without retropulsion. There is  ventral epidural tumor involvement at T7 and T8 with at least moderate central canal stenosis at that level.  -No FND, bladder or bowel dysfunction. Reports UE and LE weakness, progressive, no numbness or tingling.  Plan:  -Neurosurgery consulted, appreciate recs pending MRI results  -Dilaudid 0.5mg q3h PRN for severe pain   -Neuro checks Q4H     #ANTONY, Non-Oliguric   #Hypokalemia   #Hyponatremia   Pt w/ history of decreased PO intake. Suspect etiology pre-renal, however ATN cannot be excluded given hypotension   -K+ 2.7 on presentation , Na 134-->131  -Unclear baseline creatinine  -s/p 3L LR  Plan:  -Replete K as needed, continue to monitor closely   -Daily CMP  -avoid nephrotoxins   -renally dose medication      #Lactic Acidosis-resolved  #Leukocytosis   -likely reactive, denies systemic signs of infection, afebrile and no evidence of infection seen on imaging.  -WBC 15.2 on admission, downtrending  Plan:  -Continue to monitor  -Daily CBC     #Anemia   #Thrombocythemia   #Intrahepatic IVC Thrombus   -No evidence of overt GI bleed. Presentation likely iso of malignancy. Patient with no history of vascular disease or history of blood clots  Plan:  -Vascular medicine consulted 8/1, appreciate recommendations  -High intensity heparin gtt      #HTN  #HLD   -Holding home HTN meds iso soft pressures since admission   -C/w Atorvastatin 40 mg  once daily     #Depression   #Anxiety   -C/w home citalopram and hydroxyzine      F: PRN  E: PRN  N: NPO for possible procedure   A: pIV  DVT: Heparin gtt  GI: not indicated      Bowel Regimen: Miralax  Code Status: DNR/DNI  NOK: Adan Veras (son): [phone]       Patient seen and staffed with attending physician. Recommendations not final until attested.   Marc Montez MD

## 2024-08-02 ENCOUNTER — ANESTHESIA EVENT (OUTPATIENT)
Dept: GASTROENTEROLOGY | Facility: HOSPITAL | Age: 59
End: 2024-08-02
Payer: COMMERCIAL

## 2024-08-02 ENCOUNTER — APPOINTMENT (OUTPATIENT)
Dept: GASTROENTEROLOGY | Facility: HOSPITAL | Age: 59
End: 2024-08-02
Payer: COMMERCIAL

## 2024-08-02 ENCOUNTER — APPOINTMENT (OUTPATIENT)
Dept: CARDIOLOGY | Facility: HOSPITAL | Age: 59
End: 2024-08-02
Payer: COMMERCIAL

## 2024-08-02 ENCOUNTER — APPOINTMENT (OUTPATIENT)
Dept: VASCULAR MEDICINE | Facility: HOSPITAL | Age: 59
End: 2024-08-02
Payer: COMMERCIAL

## 2024-08-02 ENCOUNTER — ANESTHESIA (OUTPATIENT)
Dept: GASTROENTEROLOGY | Facility: HOSPITAL | Age: 59
End: 2024-08-02
Payer: COMMERCIAL

## 2024-08-02 PROBLEM — I10 HTN (HYPERTENSION): Status: ACTIVE | Noted: 2024-08-02

## 2024-08-02 LAB
ALBUMIN SERPL BCP-MCNC: 2.4 G/DL (ref 3.4–5)
ANION GAP SERPL CALC-SCNC: 14 MMOL/L (ref 10–20)
BASOPHILS # BLD AUTO: 0.08 X10*3/UL (ref 0–0.1)
BASOPHILS NFR BLD AUTO: 0.7 %
BUN SERPL-MCNC: 15 MG/DL (ref 6–23)
CALCIUM SERPL-MCNC: 8.2 MG/DL (ref 8.6–10.6)
CHLORIDE SERPL-SCNC: 100 MMOL/L (ref 98–107)
CO2 SERPL-SCNC: 24 MMOL/L (ref 21–32)
CREAT SERPL-MCNC: 0.86 MG/DL (ref 0.5–1.05)
EGFRCR SERPLBLD CKD-EPI 2021: 77 ML/MIN/1.73M*2
EOSINOPHIL # BLD AUTO: 0.16 X10*3/UL (ref 0–0.7)
EOSINOPHIL NFR BLD AUTO: 1.5 %
ERYTHROCYTE [DISTWIDTH] IN BLOOD BY AUTOMATED COUNT: 14.4 % (ref 11.5–14.5)
GLUCOSE SERPL-MCNC: 94 MG/DL (ref 74–99)
HCT VFR BLD AUTO: 24.4 % (ref 36–46)
HGB BLD-MCNC: 7.9 G/DL (ref 12–16)
IMM GRANULOCYTES # BLD AUTO: 0.1 X10*3/UL (ref 0–0.7)
IMM GRANULOCYTES NFR BLD AUTO: 0.9 % (ref 0–0.9)
LYMPHOCYTES # BLD AUTO: 1.61 X10*3/UL (ref 1.2–4.8)
LYMPHOCYTES NFR BLD AUTO: 15 %
MAGNESIUM SERPL-MCNC: 2.17 MG/DL (ref 1.6–2.4)
MCH RBC QN AUTO: 26.6 PG (ref 26–34)
MCHC RBC AUTO-ENTMCNC: 32.4 G/DL (ref 32–36)
MCV RBC AUTO: 82 FL (ref 80–100)
MONOCYTES # BLD AUTO: 0.8 X10*3/UL (ref 0.1–1)
MONOCYTES NFR BLD AUTO: 7.5 %
NEUTROPHILS # BLD AUTO: 7.98 X10*3/UL (ref 1.2–7.7)
NEUTROPHILS NFR BLD AUTO: 74.4 %
NRBC BLD-RTO: 0 /100 WBCS (ref 0–0)
PHOSPHATE SERPL-MCNC: 4.2 MG/DL (ref 2.5–4.9)
PLATELET # BLD AUTO: 579 X10*3/UL (ref 150–450)
POTASSIUM SERPL-SCNC: 3.3 MMOL/L (ref 3.5–5.3)
RBC # BLD AUTO: 2.97 X10*6/UL (ref 4–5.2)
SODIUM SERPL-SCNC: 135 MMOL/L (ref 136–145)
UFH PPP CHRO-ACNC: 0.2 IU/ML
WBC # BLD AUTO: 10.7 X10*3/UL (ref 4.4–11.3)

## 2024-08-02 PROCEDURE — 7100000010 HC PHASE TWO TIME - EACH INCREMENTAL 1 MINUTE

## 2024-08-02 PROCEDURE — P9045 ALBUMIN (HUMAN), 5%, 250 ML: HCPCS | Mod: JZ

## 2024-08-02 PROCEDURE — 3700000002 HC GENERAL ANESTHESIA TIME - EACH INCREMENTAL 1 MINUTE

## 2024-08-02 PROCEDURE — 88305 TISSUE EXAM BY PATHOLOGIST: CPT | Performed by: PATHOLOGY

## 2024-08-02 PROCEDURE — 1200000002 HC GENERAL ROOM WITH TELEMETRY DAILY

## 2024-08-02 PROCEDURE — 36415 COLL VENOUS BLD VENIPUNCTURE: CPT

## 2024-08-02 PROCEDURE — 2500000005 HC RX 250 GENERAL PHARMACY W/O HCPCS

## 2024-08-02 PROCEDURE — 3700000001 HC GENERAL ANESTHESIA TIME - INITIAL BASE CHARGE

## 2024-08-02 PROCEDURE — 2500000004 HC RX 250 GENERAL PHARMACY W/ HCPCS (ALT 636 FOR OP/ED)

## 2024-08-02 PROCEDURE — 81458 SO GSAP DNA CPY NMBR&MCRSTL: CPT | Performed by: STUDENT IN AN ORGANIZED HEALTH CARE EDUCATION/TRAINING PROGRAM

## 2024-08-02 PROCEDURE — 88173 CYTOPATH EVAL FNA REPORT: CPT | Performed by: PATHOLOGY

## 2024-08-02 PROCEDURE — 83735 ASSAY OF MAGNESIUM: CPT

## 2024-08-02 PROCEDURE — 85025 COMPLETE CBC W/AUTO DIFF WBC: CPT

## 2024-08-02 PROCEDURE — 2500000004 HC RX 250 GENERAL PHARMACY W/ HCPCS (ALT 636 FOR OP/ED): Performed by: STUDENT IN AN ORGANIZED HEALTH CARE EDUCATION/TRAINING PROGRAM

## 2024-08-02 PROCEDURE — 2500000001 HC RX 250 WO HCPCS SELF ADMINISTERED DRUGS (ALT 637 FOR MEDICARE OP)

## 2024-08-02 PROCEDURE — 88342 IMHCHEM/IMCYTCHM 1ST ANTB: CPT | Performed by: PATHOLOGY

## 2024-08-02 PROCEDURE — 07D78ZX EXTRACTION OF THORAX LYMPHATIC, VIA NATURAL OR ARTIFICIAL OPENING ENDOSCOPIC, DIAGNOSTIC: ICD-10-PCS | Performed by: STUDENT IN AN ORGANIZED HEALTH CARE EDUCATION/TRAINING PROGRAM

## 2024-08-02 PROCEDURE — 31652 BRONCH EBUS SAMPLNG 1/2 NODE: CPT | Performed by: STUDENT IN AN ORGANIZED HEALTH CARE EDUCATION/TRAINING PROGRAM

## 2024-08-02 PROCEDURE — 88341 IMHCHEM/IMCYTCHM EA ADD ANTB: CPT | Performed by: PATHOLOGY

## 2024-08-02 PROCEDURE — 7100000009 HC PHASE TWO TIME - INITIAL BASE CHARGE

## 2024-08-02 PROCEDURE — 85520 HEPARIN ASSAY: CPT

## 2024-08-02 PROCEDURE — 88172 CYTP DX EVAL FNA 1ST EA SITE: CPT | Performed by: PATHOLOGY

## 2024-08-02 PROCEDURE — 99232 SBSQ HOSP IP/OBS MODERATE 35: CPT

## 2024-08-02 PROCEDURE — 7100000001 HC RECOVERY ROOM TIME - INITIAL BASE CHARGE

## 2024-08-02 PROCEDURE — 2720000007 HC OR 272 NO HCPCS

## 2024-08-02 PROCEDURE — 88360 TUMOR IMMUNOHISTOCHEM/MANUAL: CPT | Performed by: PATHOLOGY

## 2024-08-02 PROCEDURE — 7100000002 HC RECOVERY ROOM TIME - EACH INCREMENTAL 1 MINUTE

## 2024-08-02 PROCEDURE — 88305 TISSUE EXAM BY PATHOLOGIST: CPT | Mod: TC,SUR | Performed by: STUDENT IN AN ORGANIZED HEALTH CARE EDUCATION/TRAINING PROGRAM

## 2024-08-02 PROCEDURE — G0452 MOLECULAR PATHOLOGY INTERPR: HCPCS | Performed by: STUDENT IN AN ORGANIZED HEALTH CARE EDUCATION/TRAINING PROGRAM

## 2024-08-02 PROCEDURE — 99232 SBSQ HOSP IP/OBS MODERATE 35: CPT | Performed by: INTERNAL MEDICINE

## 2024-08-02 PROCEDURE — 88173 CYTOPATH EVAL FNA REPORT: CPT | Mod: TC,MCY | Performed by: STUDENT IN AN ORGANIZED HEALTH CARE EDUCATION/TRAINING PROGRAM

## 2024-08-02 PROCEDURE — 80069 RENAL FUNCTION PANEL: CPT

## 2024-08-02 RX ORDER — MIDAZOLAM HYDROCHLORIDE 1 MG/ML
INJECTION INTRAMUSCULAR; INTRAVENOUS AS NEEDED
Status: DISCONTINUED | OUTPATIENT
Start: 2024-08-02 | End: 2024-08-02

## 2024-08-02 RX ORDER — ALBUMIN HUMAN 50 G/1000ML
SOLUTION INTRAVENOUS AS NEEDED
Status: DISCONTINUED | OUTPATIENT
Start: 2024-08-02 | End: 2024-08-02

## 2024-08-02 RX ORDER — ROCURONIUM BROMIDE 10 MG/ML
INJECTION, SOLUTION INTRAVENOUS AS NEEDED
Status: DISCONTINUED | OUTPATIENT
Start: 2024-08-02 | End: 2024-08-02

## 2024-08-02 RX ORDER — PHENYLEPHRINE HCL IN 0.9% NACL 0.4MG/10ML
SYRINGE (ML) INTRAVENOUS AS NEEDED
Status: DISCONTINUED | OUTPATIENT
Start: 2024-08-02 | End: 2024-08-02

## 2024-08-02 RX ORDER — NORETHINDRONE AND ETHINYL ESTRADIOL 0.5-0.035
KIT ORAL AS NEEDED
Status: DISCONTINUED | OUTPATIENT
Start: 2024-08-02 | End: 2024-08-02

## 2024-08-02 RX ORDER — PROPOFOL 10 MG/ML
INJECTION, EMULSION INTRAVENOUS CONTINUOUS PRN
Status: DISCONTINUED | OUTPATIENT
Start: 2024-08-02 | End: 2024-08-02

## 2024-08-02 RX ORDER — OXYCODONE HYDROCHLORIDE 5 MG/1
5 TABLET ORAL EVERY 4 HOURS PRN
Status: DISCONTINUED | OUTPATIENT
Start: 2024-08-02 | End: 2024-08-04

## 2024-08-02 RX ORDER — HYDROMORPHONE HYDROCHLORIDE 1 MG/ML
0.5 INJECTION, SOLUTION INTRAMUSCULAR; INTRAVENOUS; SUBCUTANEOUS EVERY 4 HOURS PRN
Status: DISCONTINUED | OUTPATIENT
Start: 2024-08-02 | End: 2024-08-09

## 2024-08-02 RX ORDER — FENTANYL CITRATE 50 UG/ML
INJECTION, SOLUTION INTRAMUSCULAR; INTRAVENOUS AS NEEDED
Status: DISCONTINUED | OUTPATIENT
Start: 2024-08-02 | End: 2024-08-02

## 2024-08-02 RX ORDER — LIDOCAINE HCL/PF 100 MG/5ML
SYRINGE (ML) INTRAVENOUS AS NEEDED
Status: DISCONTINUED | OUTPATIENT
Start: 2024-08-02 | End: 2024-08-02

## 2024-08-02 RX ORDER — ONDANSETRON HYDROCHLORIDE 2 MG/ML
INJECTION, SOLUTION INTRAVENOUS AS NEEDED
Status: DISCONTINUED | OUTPATIENT
Start: 2024-08-02 | End: 2024-08-02

## 2024-08-02 ASSESSMENT — PAIN DESCRIPTION - LOCATION
LOCATION: OTHER (COMMENT)
LOCATION: ABDOMEN
LOCATION: ABDOMEN

## 2024-08-02 ASSESSMENT — COGNITIVE AND FUNCTIONAL STATUS - GENERAL
CLIMB 3 TO 5 STEPS WITH RAILING: A LITTLE
HELP NEEDED FOR BATHING: A LITTLE
DAILY ACTIVITIY SCORE: 18
DRESSING REGULAR LOWER BODY CLOTHING: A LITTLE
CLIMB 3 TO 5 STEPS WITH RAILING: A LITTLE
TOILETING: A LITTLE
DAILY ACTIVITIY SCORE: 24
EATING MEALS: A LITTLE
TURNING FROM BACK TO SIDE WHILE IN FLAT BAD: A LITTLE
DRESSING REGULAR UPPER BODY CLOTHING: A LITTLE
WALKING IN HOSPITAL ROOM: A LITTLE
MOBILITY SCORE: 23
MOVING TO AND FROM BED TO CHAIR: A LITTLE
MOVING FROM LYING ON BACK TO SITTING ON SIDE OF FLAT BED WITH BEDRAILS: A LITTLE
PERSONAL GROOMING: A LITTLE

## 2024-08-02 ASSESSMENT — PAIN - FUNCTIONAL ASSESSMENT
PAIN_FUNCTIONAL_ASSESSMENT: 0-10

## 2024-08-02 ASSESSMENT — PAIN SCALES - GENERAL
PAINLEVEL_OUTOF10: 4
PAIN_LEVEL: 1
PAINLEVEL_OUTOF10: 0 - NO PAIN
PAINLEVEL_OUTOF10: 0 - NO PAIN
PAINLEVEL_OUTOF10: 8
PAINLEVEL_OUTOF10: 8
PAINLEVEL_OUTOF10: 10 - WORST POSSIBLE PAIN
PAINLEVEL_OUTOF10: 0 - NO PAIN
PAINLEVEL_OUTOF10: 9
PAINLEVEL_OUTOF10: 0 - NO PAIN
PAINLEVEL_OUTOF10: 0 - NO PAIN

## 2024-08-02 ASSESSMENT — ACTIVITIES OF DAILY LIVING (ADL): LACK_OF_TRANSPORTATION: NO

## 2024-08-02 NOTE — PROGRESS NOTES
"   08/02/24 0915   Discharge Planning   Living Arrangements Family members;Children   Support Systems Children   Assistance Needed laundry   Type of Residence Private residence   Who is requesting discharge planning? Provider   Home or Post Acute Services None   Expected Discharge Disposition Home   Does the patient need discharge transport arranged? No  (son to provide)   Financial Resource Strain   How hard is it for you to pay for the very basics like food, housing, medical care, and heating? Not hard   Housing Stability   In the last 12 months, was there a time when you were not able to pay the mortgage or rent on time? N   In the past 12 months, how many times have you moved where you were living? 0   At any time in the past 12 months, were you homeless or living in a shelter (including now)? N   Transportation Needs   In the past 12 months, has lack of transportation kept you from medical appointments or from getting medications? no   In the past 12 months, has lack of transportation kept you from meetings, work, or from getting things needed for daily living? No     Met with pt and introduced myself as care coordinator and member of the Care Transitions team for discharge planning. Pt lives at home with her son (his wife + 3 sons), reports her  recently passed away. Pt stated she is independent with ADL's when she is not in pain. Pt does \"little\" cooking, walks without use of an assistive device, prepare her own meds and still drives. Pt reported difficulty with clipping toe nails, showering, and having legs when she is in pain, son assists her with laundry. Pt denies any recent falls (last fall 2021). Pt stated she feels safe at home. Pt's address, phone number, and contact information was verified. Pt denies any social or financial concerns at this time.    Home care: none.  DME: rollator, pulse ox device, cane. (Pt wearing O2/NC but does not have home O2).  : none.  PCP: Agustin Yuan DO " (NP Sania Tomlinson) Last appt: 7/25/2024  Transport to appts: Pt usually drives, son drives when she is not in pain.  Pharm: [facility] in [place] (denies issues affording/obtaining medications)    Discharge Planning: Pt voiced no home going needs at time of discharge at this time. Care coordinator will continue to follow for discharge planning needs.    Ellie Us RN  Transitional Care Coordinator/TCC  [phone]

## 2024-08-02 NOTE — PROGRESS NOTES
Kassandra Veras is a 58 y.o. female on day 2 of admission presenting with Mass of upper lobe of left lung.      Subjective   Patient was seen and examined at bedside this morning.  She is currently on 2 L nasal cannula oxygen as she had an episode of desaturation (SpO2 87%) yesterday evening.  Currently n.p.o. and UFH gtt on hold as she is planned for bronchoscopy/biopsy this morning.  On my evaluation reports that her breathing is currently stable.  Continues to have diffuse abdominal pain and thoracic back pain.  There is also an acute drop in her hemoglobin 8.2 ->9.1 ->7.9. she denies any hemoptysis, hematemesis, melena or hematochezia.  Noted to have +RBC in UA but has not noted any other abnormal bleeding.    NOW S/P BRONCH  ON UFH GTT  NO BLEEDING, NO HEMOPTYSIS  +SOB AND BACK PAIN       Objective     Last Recorded Vitals  BP 90/65   Pulse 84   Temp 36 °C (96.8 °F) (Temporal)   Resp 18   Wt 74.4 kg (164 lb)   SpO2 98%   Intake/Output last 3 Shifts:    Intake/Output Summary (Last 24 hours) at 8/2/2024 1006  Last data filed at 8/1/2024 1315  Gross per 24 hour   Intake 43.33 ml   Output --   Net 43.33 ml       Admission Weight  Weight: 74.4 kg (164 lb) (07/31/24 1721)    Daily Weight  07/31/24 : 74.4 kg (164 lb)    Image Results  MR brain w and wo IV contrast, MR cervical spine w and wo IV contrast, MR thoracic spine w and wo IV contrast, MR lumbar spine w and wo IV contrast  Narrative: Interpreted By:  Violeta Velazquez,  and Geraldine Thorne   STUDY:  MR BRAIN W AND WO IV CONTRAST; MR LUMBAR SPINE W AND WO IV CONTRAST;  MR THORACIC SPINE W AND WO IV CONTRAST; MR CERVICAL SPINE W AND WO IV  CONTRAST;  8/1/2024 11:06 am; 8/1/2024 11:07 am      INDICATION:  Signs/Symptoms:new lung and adrenal mass, c/f metastatic disease;  Signs/Symptoms:c/f metastatic disease; Signs/Symptoms:lytic lesions,  c/f metastatic disease.      COMPARISON:  CT angio chest for PE 07/31/2020, CT abdomen pelvis 07/31/2024      ACCESSION  NUMBER(S):  [id number]; [id number]; [id number]; [id number]      ORDERING CLINICIAN:  PRABHAKAR ANTOINE      TECHNIQUE:  Axial T2, FLAIR, DWI, gradient echo T2 and sagittal and coronal T1  weighted images of brain were acquired. Post contrast T1 weighted  images were acquired after administration of 15 ML Dotarem gadolinium  based intravenous contrast.      FINDINGS:  MRI BRAIN:      The examination is degraded by patient motion artifact.      CSF Spaces: The ventricles, sulci and basal cisterns are within  normal limits.      Parenchyma: There is no diffusion restriction abnormality to suggest  acute infarct.  There is no focal parenchymal signal abnormality.  There is no mass effect or midline shift. No evidence of abnormal  intracranial enhancement or enhancing mass.      Paranasal Sinuses and Mastoids: Minimal mucosal thickening within the  ethmoid air cells. Mucous retention cyst versus polyp within the left  maxillary sinus. Nonspecific 1 cm ovoid T2 hyperintense nodule within  the left parotid gland with peripheral enhancement.          MRI CERVICAL SPINE:      Alignment: Straightening of the normal cervical lordosis. Alignment  is otherwise maintained.      Vertebrae/Intervertebral Discs: The vertebral bodies demonstrate  expected height. The bone marrow signal within the cervical spine is  diffusely heterogeneous without a definite focal lesion on the STIR  sequence. Minimal increased STIR sequence within the endplates at  C5-C6 may be degenerative. Desiccated disc signal throughout the  cervical spine with mild disc height loss at C5-C6 and C6-C7.      Cord: Normal in caliber and signal. No abnormal cord enhancement.      C1-C2: The cervicomedullary junction appears unremarkable. There is  no spinal canal stenosis.      C2-C3: Mild disc osteophyte complex. No spinal canal or neural  foraminal stenosis.      C3-C4: Mild disc osteophyte complex. No spinal canal or neural  foraminal stenosis.      C4-C5:  Mild disc osteophyte complex, uncovertebral joint hypertrophy  and facet arthrosis. No spinal canal stenosis. Mild left and no right  neural foraminal stenosis.      C5-C6: Disc osteophyte complex, uncovertebral joint hypertrophy and  facet arthrosis. Mild spinal canal stenosis. Moderate bilateral  neural foraminal stenosis.      C6-C7: Disc osteophyte complex, uncovertebral joint hypertrophy and  facet arthrosis. No spinal canal stenosis. Mild neural foraminal  stenosis.      C7-T1: No spinal canal or neural foraminal stenosis.      Prevertebral soft tissues are not thickened. Borderline left level 2  lymph nodes are nonspecific.          MRI THORACIC SPINE:      VISUALIZED CHEST: Partial visualization of a large left suprahilar  mass, which is better evaluated on recent CT angio chest for PE dated  07/31/2024.      PARASPINAL SOFT TISSUES: No significant abnormality.      SPINAL CORD: There is compression of the spinal cord at the T8 level  with increased cord signal. The remaining thoracic cord is  unremarkable without compression.      BONE MARROW/VERTEBRAL BODIES: Near-complete marrow replacement of the  T8 vertebral body with diffuse T1 hypointensity, T2/STIR  hyperintensity, and avid enhancement on post gadolinium imaging.  There is an associated acute compression deformity with approximately  30% vertebral body height loss centrally. There is extraosseous  epidural tumor within the anterior and lateral epidural space  resulting in severe spinal canal stenosis.      There is diffusely heterogeneous signal throughout the vertebral  bodies of the thoracic spine. There are multiple indeterminate  subcentimeter T1 hypointense foci noted within the T6, T7, T10 and  T11 vertebral bodies. Vertebral body heights are otherwise maintained.      ALIGNMENT: No traumatic spondylolisthesis.      SPINAL CANAL, INTERVERTEBRAL DISCS, AND NEURAL FORAMINA:  From T1-T2 through T6-T7 there is no significant spinal canal  or  neural foraminal stenosis. There is a small amount of epidural soft  tissue posterior to the C7 vertebral body with mild-to-moderate  spinal canal stenosis. From T9 through T12 there is no significant  spinal canal or neural foraminal stenosis.      There is mild narrowing of the neural foramen at T7-T8 and T8-T9  secondary to extraosseous tumor.          MRI LUMBAR SPINE:      Alignment: The vertebral alignment is maintained.      Vertebrae/Intervertebral Discs: The vertebral bodies demonstrate  expected height. The bone marrow signal is diffusely heterogeneous  without a definite focal lesion. There is desiccated disc signal  throughout the lumbar spine without significant loss of disc height.      Conus: The lower thoracic cord appears unremarkable. The conus  terminates at L1. No abnormal signal or enhancement within the distal  cord or nerve roots.      T12-L1 through L1-L2: No spinal canal or neural foraminal stenosis.      L2-L3: Mild disc bulge and facet arthrosis. No spinal canal stenosis.  Mild neural foraminal stenosis.      L3-4: Disc bulge and facet arthrosis. No spinal canal stenosis, mild  narrowing of the subarticular recess and mild bilateral neural  foraminal stenosis.      L4-5: Mild disc bulge and facet arthrosis. No spinal canal stenosis.  Mild neural foraminal stenosis.      L5-S1: No spinal canal or neural foraminal stenosis.      Redemonstration of a large, heterogenous mixed signal intensity  lesion arising from the right kidney and bilateral adrenal glands are  again which are better evaluated on recent CT abdomen pelvis dated  07/31/2024.      Impression: MRI BRAIN:  1. No evidence of acute infarct, intracranial mass effect or midline  shift.  2. No evidence of intracranial metastatic disease within the  limitation of patient motion artifact.  3. There is a T2 hyperintense peripherally enhancing nodule within  the left parotid gland measuring 1 cm which may represent a cystic  or  necrotic lymph node versus a primary parotid lesion.          MRI CERVICAL, THORACIC, & LUMBAR SPINE:      Cervical spine:      1. Heterogeneous bone marrow signal throughout the cervical spine  without definite evidence of a focal bone marrow replacing lesion.  2.   No abnormal signal or enhancement within the cervical cord.  3.   Mild degenerative changes without significant spinal canal  stenosis. Neural foraminal narrowing as detailed above. No cervical  cord compression.  4.   Borderline level 2 lymph nodes are nonspecific.          Thoracic spine:  1. Near-complete marrow replacement of the T8 vertebral body with  avid enhancement and acute compression deformity with approximately  30% vertebral body height loss, findings are compatible with osseous  metastatic disease involvement with superimposed pathologic fracture.  There is extraosseous tumor at the T8 level within the anterior and  lateral epidural space resulting in severe spinal canal stenosis and  compression of the thoracic cord. There is subtle increased cord  signal. The epidural tumor extends along the posterior T7 vertebral  body with mild-to-moderate spinal canal stenosis. There is  extraosseous tumor within the neural foramen at T7-T8 and T8-T9 with  mild neural foraminal narrowing.  2. There is diffusely heterogeneous signal throughout the vertebral  bodies of the thoracic spine. There are multiple indeterminate  subcentimeter T1 hypointense foci within the T6, T7, T10 and T11  vertebral bodies. Consider short-term interval follow-up for further  evaluation.  3. Numerous left suprahilar, right renal, and adrenal gland lesions  are partially visualized, better evaluated on recent CT abdomen  pelvis dated 07/31/2024.      Lumbar spine:          1. Diffusely heterogeneous bone marrow signal without a definite  focal lesion.  2. Degenerative changes without significant spinal canal stenosis. No  compression of the distal cord or nerve  roots.  3. No abnormal signal or enhancement within the distal cord or nerve  roots.              I personally reviewed the images/study, and I agree with the findings  as stated above. This study was interpreted at [facility], [place], [place].      MACRO:  None      Signed by: Violeta Velazquez 8/1/2024 1:23 PM  Dictation workstation:   [id number]      Physical Exam    General appearance: alert and oriented, in no acute distress  Lungs: clear to auscultation bilaterally and 2 L nasal cannula oxygen  Heart: regular rate and rhythm and S1, S2 normal  Abdomen: abnormal findings:  Diffusely tender to palpation  Extremities: no edema, redness or tenderness in the calves or thighs  Pulses: 2+ and symmetric  Skin: Skin color, texture, turgor normal. 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  Vascular medicine has been consulted for further recommendations on anticoagulation of IVC thrombus.  Plan for bronc and tissue biopsy today.  UFH gtt on hold for procedure which can be resumed after biopsy and is reasonable for now. Also need to further investigate whether this is a bland tumor versus tumor plus thrombus.  Ideally a PET scan would be helpful in this case.  Please obtain an echocardiogram to further evaluate for pericardial involvement.  If there is pericardial involvement she remains at high risk for bleed especially given the malignancy diagnosis.  In addition if this is a renal cell carcinoma in origin, it will be also a high-bleeding risk tumor ( biopsy results will provide us with more answers to determine the origin of malignancy). Please also obtain a venous ultrasound in the vascular lab to look for  DVTs.    We will follow the pt peripherally over the weekend. Dr. Lisset White will be on service. Please page  at [phone] if there are any changes in the status or if there are any questions or concerns.          Recs not final until signed by  attending Dr. Reynoso.    Estefania Person MD   Fellow     I saw and evaluated the patient. I personally obtained the key and critical portions of the history and physical exam or was physically present for key and critical portions performed by the resident/fellow. I reviewed the resident/fellow's documentation and discussed the patient with the resident/fellow. I agree with the resident/fellow's medical decision making as documented in the note with the exception/addition of the following:    Lyssa Reynoso MD    ONGOING EVALUATION FOR THE LUNG MASS/ADRENAL/RENAL MASS AND IVC THROMBUS AS NOTED    CONTINUE UFH GTT NOW    US PENDING - NOT DONE TODAY.  IF US + WILL NEED ONGOING AC. IF NEG - CONSIDERATION FOR PET MAY BE MORE IMPORTANT.      AWAITING ECHO, TOO.     following  Please page [phone] for any concerns    Lyssa Reynoso MD

## 2024-08-02 NOTE — PROGRESS NOTES
"Kassandra Veras is a 58 y.o. female on day 2 of admission presenting with Mass of upper lobe of left lung.    Subjective   No events overnight    Objective     Physical Exam  AOx3  RUE D5 B5 T4+ HG/IO 4+  RLE HF4+ KE5 DF/PF 5  LUE D5 B5 T5 HG/IO 5  LLE HF4+ KE5 DF/PF 5  SILT    Last Recorded Vitals  Blood pressure 107/69, pulse 86, temperature 35.3 °C (95.5 °F), resp. rate 18, height 1.676 m (5' 6\"), weight 74.4 kg (164 lb), SpO2 98%.  Intake/Output last 3 Shifts:  I/O last 3 completed shifts:  In: 1042.3 (14 mL/kg) [Blood:43.3; IV Piggyback:999]  Out: - (0 mL/kg)   Dosing Weight: 74.4 kg     Relevant Results  Lab Results   Component Value Date    WBC 13.0 (H) 08/01/2024    HGB 9.1 (L) 08/01/2024    HCT 29.2 (L) 08/01/2024    MCV 87 08/01/2024     (H) 08/01/2024     Lab Results   Component Value Date    GLUCOSE 94 08/01/2024    CALCIUM 7.9 (L) 08/01/2024     (L) 08/01/2024    K 3.8 08/01/2024    CO2 25 08/01/2024    CL 99 08/01/2024    BUN 17 08/01/2024    CREATININE 1.12 (H) 08/01/2024       Assessment/Plan   Principal Problem:    Mass of upper lobe of left lung  Active Problems:    Hypokalemia    Kassandra is a 58 y.o. female with h/o HTN, HLD, depression, anxiety p/w hypotension and tachycardia, RUQ US IVC thrombus, R renal mass, CT PE L perihilar mass encasing L pulmonary artery, CT AP b/l adrenal masses, R adrenal mass w/ extension into infrahepatic IVC, T8 compression fracture w 50% height loss, ventral epidural lesion, R iliac osteolytic lesion     DNR/DNI  Jose primary   OR planning week of 8/5  Vasc med recs  Vasc surg recs  Pulm recs   Will follow up bronchoscopy   Please document RCRI, medical risk stratification   Rest per primary              Amrik Tamayo MD      "

## 2024-08-02 NOTE — ANESTHESIA POSTPROCEDURE EVALUATION
Patient: Kassandra Maskchapo    Procedure Summary       Date: 08/02/24 Room / Location: [facility]    Anesthesia Start: 1123 Anesthesia Stop: 1216    Procedure: BRONCHOSCOPY Diagnosis: Malignancy (Multi)    Scheduled Providers: Allen Mckeon MD Responsible Provider: Brandi Collins MD    Anesthesia Type: general ASA Status: 4            Anesthesia Type: general    Vitals Value Taken Time   BP 98/68 08/02/24 1215   Temp 36 °C (96.8 °F) 08/02/24 1215   Pulse 95 08/02/24 1215   Resp 11 08/02/24 1215   SpO2 97 % 08/02/24 1215       Anesthesia Post Evaluation    Patient location during evaluation: PACU  Patient participation: complete - patient participated  Level of consciousness: awake  Pain score: 1  Pain management: adequate  Multimodal analgesia pain management approach  Airway patency: patent  Two or more strategies used to mitigate risk of obstructive sleep apnea  Cardiovascular status: acceptable  Respiratory status: acceptable  Hydration status: acceptable  Postoperative Nausea and Vomiting: none        There were no known notable events for this encounter.

## 2024-08-02 NOTE — ANESTHESIA PROCEDURE NOTES
Airway  Date/Time: 8/2/2024 11:35 AM  Urgency: elective    Airway not difficult    Staffing  Performed: CAA and JOLENE   Authorized by: Brandi Collins MD    Performed by: JOHNSON Aly  Patient location during procedure: OR    Indications and Patient Condition  Indications for airway management: anesthesia and airway protection  Sedation level: deep  Preoxygenated: yes  Patient position: sniffing  Mask difficulty assessment: 1 - vent by mask    Final Airway Details  Final airway type: endotracheal airway      Successful airway: ETT  Cuffed: yes   Successful intubation technique: direct laryngoscopy  Facilitating devices/methods: intubating stylet  Endotracheal tube insertion site: oral  Blade: Jemma  Blade size: #3  ETT size (mm): 8.5  Cormack-Lehane Classification: grade I - full view of glottis  Placement verified by: chest auscultation and capnometry   Measured from: lips  ETT to lips (cm): 21  Number of attempts at approach: 1

## 2024-08-02 NOTE — CARE PLAN
The patient's goals for the shift include  no falls or injury    The clinical goals for the shift include no falls , no increase in pain      Problem: Pain  Goal: Takes deep breaths with improved pain control throughout the shift  Outcome: Progressing  Goal: Turns in bed with improved pain control throughout the shift  Outcome: Progressing  Goal: Walks with improved pain control throughout the shift  Outcome: Progressing     Problem: Respiratory  Goal: Clear secretions with interventions this shift  Outcome: Progressing  Goal: Minimize anxiety/maximize coping throughout shift  Outcome: Progressing  Goal: Tolerate pulmonary toileting this shift  Outcome: Progressing  Goal: Wean oxygen to maintain O2 saturation per order/standard this shift  Outcome: Progressing  Goal: Increase self care and/or family involvement in next 24 hours  Outcome: Progressing     Problem: Skin  Goal: Decreased wound size/increased tissue granulation at next dressing change  Outcome: Progressing  Flowsheets (Taken 8/2/2024 1758)  Decreased wound size/increased tissue granulation at next dressing change:   Promote sleep for wound healing   Protective dressings over bony prominences  Goal: Participates in plan/prevention/treatment measures  Outcome: Progressing  Flowsheets (Taken 8/2/2024 1758)  Participates in plan/prevention/treatment measures:   Discuss with provider PT/OT consult   Elevate heels  Goal: Prevent/manage excess moisture  Outcome: Progressing  Flowsheets (Taken 8/2/2024 1758)  Prevent/manage excess moisture:   Monitor for/manage infection if present   Cleanse incontinence/protect with barrier cream  Goal: Prevent/minimize sheer/friction injuries  Outcome: Progressing  Flowsheets (Taken 8/2/2024 1758)  Prevent/minimize sheer/friction injuries:   Utilize specialty bed per algorithm   Turn/reposition every 2 hours/use positioning/transfer devices  Goal: Promote/optimize nutrition  Outcome: Progressing  Flowsheets (Taken 8/2/2024  4359)  Promote/optimize nutrition:   Monitor/record intake including meals   Offer water/supplements/favorite foods  Goal: Promote skin healing  Outcome: Progressing  Flowsheets (Taken 8/2/2024 1766)  Promote skin healing:   Turn/reposition every 2 hours/use positioning/transfer devices   Rotate device position/do not position patient on device

## 2024-08-02 NOTE — CARE PLAN
The patient's goals for the shift include  hds, no increase in oxygen supp    The clinical goals for the shift include  no falls       Problem: Pain  Goal: Takes deep breaths with improved pain control throughout the shift  Outcome: Progressing  Goal: Turns in bed with improved pain control throughout the shift  Outcome: Progressing  Goal: Walks with improved pain control throughout the shift  Outcome: Progressing     Problem: Respiratory  Goal: Clear secretions with interventions this shift  Outcome: Progressing  Goal: Minimize anxiety/maximize coping throughout shift  Outcome: Progressing  Goal: Tolerate pulmonary toileting this shift  Outcome: Progressing  Goal: Wean oxygen to maintain O2 saturation per order/standard this shift  Outcome: Progressing  Goal: Increase self care and/or family involvement in next 24 hours  Outcome: Progressing     Problem: Skin  Goal: Decreased wound size/increased tissue granulation at next dressing change  Outcome: Progressing  Flowsheets (Taken 8/1/2024 2016)  Decreased wound size/increased tissue granulation at next dressing change:   Utilize specialty bed per algorithm   Promote sleep for wound healing  Goal: Participates in plan/prevention/treatment measures  Outcome: Progressing  Flowsheets (Taken 8/1/2024 2016)  Participates in plan/prevention/treatment measures:   Increase activity/out of bed for meals   Discuss with provider PT/OT consult  Goal: Prevent/manage excess moisture  Outcome: Progressing  Flowsheets (Taken 8/1/2024 2016)  Prevent/manage excess moisture:   Monitor for/manage infection if present   Follow provider orders for dressing changes  Goal: Prevent/minimize sheer/friction injuries  Outcome: Progressing  Flowsheets (Taken 8/1/2024 2016)  Prevent/minimize sheer/friction injuries:   Turn/reposition every 2 hours/use positioning/transfer devices   Increase activity/out of bed for meals  Goal: Promote/optimize nutrition  Outcome: Progressing  Flowsheets (Taken  8/1/2024 2016)  Promote/optimize nutrition:   Offer water/supplements/favorite foods   Reassess MST if dietician not consulted   Monitor/record intake including meals  Goal: Promote skin healing  Outcome: Progressing  Flowsheets (Taken 8/1/2024 2016)  Promote skin healing:   Turn/reposition every 2 hours/use positioning/transfer devices   Rotate device position/do not position patient on device

## 2024-08-02 NOTE — PROGRESS NOTES
"Kassandra Veras is a 58 y.o. female on hospital day 2 of admission presenting with Mass of upper lobe of left lung.    Subjective   NAEON. Patient feeling well this morning, reports mild to moderate pain but states she slept well.          Objective     Physical Exam  Vitals reviewed.   Constitutional:       General: She is awake. She is not in acute distress.     Appearance: Normal appearance.   Eyes:      Extraocular Movements: Extraocular movements intact.   Cardiovascular:      Rate and Rhythm: Normal rate and regular rhythm.      Heart sounds: Normal heart sounds. No murmur heard.  Pulmonary:      Effort: Pulmonary effort is normal. No respiratory distress.      Breath sounds: Normal breath sounds.   Abdominal:      General: Abdomen is flat. Bowel sounds are normal.      Palpations: Abdomen is soft.      Tenderness: There is no abdominal tenderness.   Skin:     General: Skin is warm and dry.   Neurological:      Mental Status: She is alert. Mental status is at baseline.   Psychiatric:         Behavior: Behavior is cooperative.         Last Recorded Vitals  Blood pressure 98/62, pulse 85, temperature 36.1 °C (97 °F), resp. rate 16, height 1.676 m (5' 6\"), weight 74.4 kg (164 lb), SpO2 95%.  Intake/Output last 24h:    Intake/Output Summary (Last 24 hours) at 8/2/2024 0711  Last data filed at 8/1/2024 1315  Gross per 24 hour   Intake 43.33 ml   Output --   Net 43.33 ml       Relevant Results  Results from last 72 hours   Lab Units 08/01/24  1836 08/01/24  1125 08/01/24  0402 07/31/24  2312 07/31/24  1750   WBC AUTO x10*3/uL  --  13.0* 12.8*  --  15.2*   HEMOGLOBIN g/dL  --  9.1* 8.2*  --  10.3*   HEMATOCRIT %  --  29.2* 25.3*  --  31.9*   PLATELETS AUTO x10*3/uL  --  566* 537*  --  601*   INR   --   --  1.6*  --  1.5*   SODIUM mmol/L 134* 135* 133* 131* 134*   POTASSIUM mmol/L 3.8 3.2* 2.9* 3.3* 2.7*   CHLORIDE mmol/L 99 101 97* 97* 94*   CO2 mmol/L 25 25 25 22 26   BUN mg/dL 17 16 19 18 18   CREATININE mg/dL 1.12* " 1.16* 1.39* 1.29* 1.49*   GLUCOSE mg/dL 94 91 97 109* 121*   CALCIUM mg/dL 7.9* 8.0* 7.9* 8.0* 8.7   MAGNESIUM mg/dL  --   --  2.17  --  2.25   PHOSPHORUS mg/dL 3.8 4.1 4.8  --   --    ALBUMIN g/dL 2.5* 2.4* 2.4* 2.6* 2.8*   ALK PHOS U/L  --   --   --  154* 188*   ALT U/L  --   --   --  11 16   AST U/L  --   --   --  14 25   BILIRUBIN TOTAL mg/dL  --   --   --  0.6 0.8     Scheduled medications  atorvastatin, 40 mg, oral, Daily  citalopram, 20 mg, oral, Daily  hydrOXYzine HCL, 25 mg, oral, Daily  polyethylene glycol, 17 g, oral, Daily      Continuous medications  [Held by provider] heparin, 0-4,500 Units/hr, Last Rate: Stopped (08/02/24 0505)      PRN medications  PRN medications: acetaminophen, heparin, HYDROmorphone         Assessment/Plan   Ms Veras is a 58 year old F, w/ PMHx of HLD, HTN, depression, anxiety, presents to  ED at the advice of her physician due to US findings of IVC and right renal vein thrombus. Imaging findings consistent with significant metastatic disease identified in adrenal glands, spine, and left upper lobe lung, unclear origin, however high suspicion for primary lung cancer.      #L. Perihilar Mass Encasing L. Pulmonary Artery  #B/L Adrenal Soft Tissues Masses   #Enlarged Portocaval Lymph Nodes   CTPE: Left upper lobe suprahilar mass with invasion into the mediastinum question of upper left pericardium involvement. Encasement of the left upper lobe pulmonary arterial branches. Enlarged subcarinal lymph node. 0.6 cm left upper lobe perifissural nodule and 0.6 cm right lower lobe pulmonary nodule.  -Extensive history of smoking, 1 pack a day since 13yrs old.  -Etiology: lung primary cancer favored w/ metastasis to the adrenals.  Plan:  -Pulmonology consulted for possible biopsy - bronch scheduled for 8/2  -Ongoing goals of care discussion with patient and her family - now DNR/DNI status     #Right Iliac Osteolytic Lesions  #Ventral Epidural Lesion   #Pathologic T8 Compression Fracture    -history of chronic back pain, however since June reports sharp, stabbing pain w/ bilateral radiation.  - CT Abd/pel w/ IV con: lytic destructive lesion with mild pathologic compression deformity of the T8 vertebral body without retropulsion. There is  ventral epidural tumor involvement at T7 and T8 with at least moderate central canal stenosis at that level.  -No FND, bladder or bowel dysfunction. Reports UE and LE weakness, progressive, no numbness or tingling.  Plan:  -Neurosurgery consulted - planning for OR next week  -Dilaudid 0.5mg q3h PRN for severe pain   -Neuro checks Q4H      #ANTONY, Non-Oliguric   #Hypokalemia   #Hyponatremia   Pt w/ history of decreased PO intake. Suspect etiology pre-renal, however ATN cannot be excluded given hypotension   -Unclear baseline creatinine  -s/p 3L LR  Plan:  -Replete K as needed, continue to monitor closely   -Daily CMP  -avoid nephrotoxins   -renally dose medication      #Leukocytosis   -likely reactive, denies systemic signs of infection, afebrile and no evidence of infection seen on imaging.  -WBC 15.2 on admission, downtrending  Plan:  -Continue to monitor  -Daily CBC     #Anemia   #Thrombocythemia   #Intrahepatic IVC Thrombus   -No evidence of overt GI bleed. Presentation likely iso of malignancy. Patient with no history of vascular disease or history of blood clots  Plan:  -Vascular medicine consulted 8/1, appreciate recommendations   - Echocardiogram to evaluate for pericardial involvement   - LE duplex to evaluate for DVTs   - Continue heparin gtt for now   - Need to differentiate between bland tumor vs tumor + thrombus - ideally PET to evaluate     #HTN  #HLD   -Holding home HTN meds iso soft pressures since admission   -C/w Atorvastatin 40 mg once daily     #Depression   #Anxiety   -C/w home citalopram and hydroxyzine      F: PRN  E: PRN  N: NPO for possible procedure   A: pIV  DVT: Heparin gtt  GI: not indicated      Bowel Regimen: Miralax  Code Status:  DNR/DNI  NOK: Adan Veras (son): [phone]       Patient seen and staffed with attending physician. Recommendations not final until attested.   Marc Montez MD

## 2024-08-02 NOTE — ANESTHESIA PREPROCEDURE EVALUATION
Patient: Kassandra Veras    Procedure Information       Date/Time: 08/02/24 1030    Scheduled providers: Allen Mckeon MD    Procedure: BRONCHOSCOPY    Location: [facility]        Ms Veras is a 58 year old F, w/ PMHx of HLD, HTN, depression, anxiety, initially presented to to  ED at the advice of her physician due to US findings of IVC and right renal vein thrombus on 7/31. Imaging findings consistent with significant metastatic disease identified in adrenal glands, spine, and left upper lobe lung, unclear origin, however high suspicion for primary lung cancer.    Bronchoscopy for tissue sample.    Discontinued heparin gtt this AM.     Relevant Problems   Anesthesia (within normal limits)      Cardiac   (+) HTN (hypertension)       Clinical information reviewed:   Tobacco  Allergies  Meds  Problems  Med Hx  Surg Hx   Fam Hx  Soc   Hx        NPO Detail:  NPO/Void Status  Date of Last Liquid: 08/02/24  Time of Last Liquid: 0800 (sips)  Date of Last Solid: 08/01/24         Physical Exam    Airway  Mallampati: II  TM distance: >3 FB  Neck ROM: full     Cardiovascular   Rhythm: regular  Rate: normal     Dental   (+) lower dentures, upper dentures     Pulmonary   Comments: On oxygen   Abdominal - normal exam             Anesthesia Plan    History of general anesthesia?: yes  History of complications of general anesthesia?: no    ASA 4     general     intravenous induction   Postoperative administration of opioids is intended.  Trial extubation is planned.  Anesthetic plan and risks discussed with patient.  Use of blood products discussed with patient who.    Plan discussed with CAA and attending.      Patient would like to rescind her DNR for duration of procedure and reinstate it after.

## 2024-08-03 ENCOUNTER — APPOINTMENT (OUTPATIENT)
Dept: CARDIOLOGY | Facility: HOSPITAL | Age: 59
End: 2024-08-03
Payer: COMMERCIAL

## 2024-08-03 LAB
ALBUMIN SERPL BCP-MCNC: 2.7 G/DL (ref 3.4–5)
ANION GAP SERPL CALC-SCNC: 11 MMOL/L (ref 10–20)
AORTIC VALVE MEAN GRADIENT: 6 MMHG
AORTIC VALVE PEAK VELOCITY: 1.76 M/S
APTT PPP: 43 SECONDS (ref 27–38)
AV PEAK GRADIENT: 12.4 MMHG
AVA (PEAK VEL): 2.34 CM2
AVA (VTI): 2.38 CM2
BASOPHILS # BLD AUTO: 0.06 X10*3/UL (ref 0–0.1)
BASOPHILS NFR BLD AUTO: 0.3 %
BUN SERPL-MCNC: 14 MG/DL (ref 6–23)
CALCIUM SERPL-MCNC: 8.5 MG/DL (ref 8.6–10.6)
CHLORIDE SERPL-SCNC: 98 MMOL/L (ref 98–107)
CO2 SERPL-SCNC: 29 MMOL/L (ref 21–32)
CREAT SERPL-MCNC: 0.71 MG/DL (ref 0.5–1.05)
EGFRCR SERPLBLD CKD-EPI 2021: >90 ML/MIN/1.73M*2
EJECTION FRACTION APICAL 4 CHAMBER: 72.3
EJECTION FRACTION: 63 %
EOSINOPHIL # BLD AUTO: 0 X10*3/UL (ref 0–0.7)
EOSINOPHIL NFR BLD AUTO: 0 %
ERYTHROCYTE [DISTWIDTH] IN BLOOD BY AUTOMATED COUNT: 14.3 % (ref 11.5–14.5)
GLUCOSE SERPL-MCNC: 108 MG/DL (ref 74–99)
HCT VFR BLD AUTO: 25.3 % (ref 36–46)
HGB BLD-MCNC: 8 G/DL (ref 12–16)
IMM GRANULOCYTES # BLD AUTO: 0.21 X10*3/UL (ref 0–0.7)
IMM GRANULOCYTES NFR BLD AUTO: 1.2 % (ref 0–0.9)
LEFT ATRIUM VOLUME AREA LENGTH INDEX BSA: 33.1 ML/M2
LEFT VENTRICLE INTERNAL DIMENSION DIASTOLE: 5 CM (ref 3.5–6)
LEFT VENTRICULAR OUTFLOW TRACT DIAMETER: 2 CM
LYMPHOCYTES # BLD AUTO: 1.88 X10*3/UL (ref 1.2–4.8)
LYMPHOCYTES NFR BLD AUTO: 10.6 %
MCH RBC QN AUTO: 26.7 PG (ref 26–34)
MCHC RBC AUTO-ENTMCNC: 31.6 G/DL (ref 32–36)
MCV RBC AUTO: 84 FL (ref 80–100)
MITRAL VALVE E/A RATIO: 1.07
MONOCYTES # BLD AUTO: 1.24 X10*3/UL (ref 0.1–1)
MONOCYTES NFR BLD AUTO: 7 %
NEUTROPHILS # BLD AUTO: 14.28 X10*3/UL (ref 1.2–7.7)
NEUTROPHILS NFR BLD AUTO: 80.9 %
NRBC BLD-RTO: 0 /100 WBCS (ref 0–0)
PHOSPHATE SERPL-MCNC: 3 MG/DL (ref 2.5–4.9)
PLATELET # BLD AUTO: 644 X10*3/UL (ref 150–450)
POTASSIUM SERPL-SCNC: 3.3 MMOL/L (ref 3.5–5.3)
RBC # BLD AUTO: 3 X10*6/UL (ref 4–5.2)
RIGHT VENTRICLE FREE WALL PEAK S': 17.3 CM/S
RIGHT VENTRICLE PEAK SYSTOLIC PRESSURE: 29.5 MMHG
SODIUM SERPL-SCNC: 135 MMOL/L (ref 136–145)
TRICUSPID ANNULAR PLANE SYSTOLIC EXCURSION: 2.6 CM
UFH PPP CHRO-ACNC: 0.3 IU/ML
UFH PPP CHRO-ACNC: 0.4 IU/ML
WBC # BLD AUTO: 17.7 X10*3/UL (ref 4.4–11.3)

## 2024-08-03 PROCEDURE — 2500000004 HC RX 250 GENERAL PHARMACY W/ HCPCS (ALT 636 FOR OP/ED)

## 2024-08-03 PROCEDURE — 93010 ELECTROCARDIOGRAM REPORT: CPT | Performed by: INTERNAL MEDICINE

## 2024-08-03 PROCEDURE — 80069 RENAL FUNCTION PANEL: CPT

## 2024-08-03 PROCEDURE — 1200000002 HC GENERAL ROOM WITH TELEMETRY DAILY

## 2024-08-03 PROCEDURE — 99232 SBSQ HOSP IP/OBS MODERATE 35: CPT | Performed by: STUDENT IN AN ORGANIZED HEALTH CARE EDUCATION/TRAINING PROGRAM

## 2024-08-03 PROCEDURE — 85025 COMPLETE CBC W/AUTO DIFF WBC: CPT

## 2024-08-03 PROCEDURE — 93005 ELECTROCARDIOGRAM TRACING: CPT

## 2024-08-03 PROCEDURE — 2500000001 HC RX 250 WO HCPCS SELF ADMINISTERED DRUGS (ALT 637 FOR MEDICARE OP)

## 2024-08-03 PROCEDURE — 85730 THROMBOPLASTIN TIME PARTIAL: CPT | Performed by: STUDENT IN AN ORGANIZED HEALTH CARE EDUCATION/TRAINING PROGRAM

## 2024-08-03 PROCEDURE — 36415 COLL VENOUS BLD VENIPUNCTURE: CPT

## 2024-08-03 PROCEDURE — 99232 SBSQ HOSP IP/OBS MODERATE 35: CPT

## 2024-08-03 PROCEDURE — 85520 HEPARIN ASSAY: CPT

## 2024-08-03 PROCEDURE — 93306 TTE W/DOPPLER COMPLETE: CPT | Performed by: INTERNAL MEDICINE

## 2024-08-03 PROCEDURE — 93306 TTE W/DOPPLER COMPLETE: CPT

## 2024-08-03 ASSESSMENT — COGNITIVE AND FUNCTIONAL STATUS - GENERAL
CLIMB 3 TO 5 STEPS WITH RAILING: A LITTLE
DAILY ACTIVITIY SCORE: 24
MOBILITY SCORE: 23
MOVING TO AND FROM BED TO CHAIR: A LITTLE
STANDING UP FROM CHAIR USING ARMS: A LITTLE
MOBILITY SCORE: 20
DAILY ACTIVITIY SCORE: 23
DRESSING REGULAR LOWER BODY CLOTHING: A LITTLE
WALKING IN HOSPITAL ROOM: A LITTLE
CLIMB 3 TO 5 STEPS WITH RAILING: A LITTLE

## 2024-08-03 ASSESSMENT — PAIN SCALES - GENERAL
PAINLEVEL_OUTOF10: 7
PAINLEVEL_OUTOF10: 8
PAINLEVEL_OUTOF10: 8

## 2024-08-03 ASSESSMENT — PAIN DESCRIPTION - LOCATION
LOCATION: ABDOMEN
LOCATION: ABDOMEN
LOCATION: BACK

## 2024-08-03 ASSESSMENT — PAIN - FUNCTIONAL ASSESSMENT
PAIN_FUNCTIONAL_ASSESSMENT: 0-10
PAIN_FUNCTIONAL_ASSESSMENT: 0-10

## 2024-08-03 NOTE — PROGRESS NOTES
Kassandra Veras is a 58 y.o. female on day 3 of admission presenting with Mass of upper lobe of left lung.      Subjective   NAEO. Bronch yesterday for LN biopsy. Per procedure note, gross specimen concerning for malignancy.     Patient denies SOB, CP, cough. On 2L NC.    Objective     Last Recorded Vitals  BP 93/54   Pulse 72   Temp 36.3 °C (97.3 °F)   Resp 17   Wt 74.4 kg (164 lb)   SpO2 97%   Intake/Output last 3 Shifts:    Intake/Output Summary (Last 24 hours) at 8/3/2024 1129  Last data filed at 8/2/2024 1210  Gross per 24 hour   Intake 400 ml   Output --   Net 400 ml       Admission Weight  Weight: 74.4 kg (164 lb) (07/31/24 1721)    Daily Weight  07/31/24 : 74.4 kg (164 lb)    Physical Exam  Constitutional: NAD, pleasant  HEENT: EOMI, no scleral icterus, MMM  CV: RRR, no m/r/g  Pulm: CTAB over anterior chest wall, no increased WOB, on 2L NC  GI: Soft, NT, ND  Neuro: grossly alert and oriented  Psych: Mood and affect appropriate to situation     Relevant Results  Results for orders placed or performed during the hospital encounter of 07/31/24 (from the past 24 hour(s))   Heparin Assay, UFH   Result Value Ref Range    Heparin Unfractionated 0.2 See Comment Below for Therapeutic Ranges IU/mL   Heparin Assay, UFH   Result Value Ref Range    Heparin Unfractionated 0.4 See Comment Below for Therapeutic Ranges IU/mL   Transthoracic Echo (TTE) Complete   Result Value Ref Range    BSA 1.86 m2   Heparin Assay, UFH   Result Value Ref Range    Heparin Unfractionated 0.3 See Comment Below for Therapeutic Ranges IU/mL          Assessment/Plan        Principal Problem:    Mass of upper lobe of left lung  Active Problems:    Hypokalemia    HTN (hypertension)    Ms Veras is a 58 year old F, w/ PMHx of HLD, HTN, depression, anxiety, presents to  ED at the advice of her physician due to US findings of IVC and right renal vein thrombus. Imaging findings consistent with significant metastatic disease identified in adrenal  glands, spine, and left upper lobe lung, unclear origin, however high suspicion for primary lung cancer.     Updates 8/3:  -s/p Bronch yesterday for LN biopsy. Per procedure note, gross specimen concerning for malignancy.   -pending pathology  -pending TTE and LE duplex US  -continue heparin gtt for now  -plan for OR week of 8/5 with NSGY, will need medication clearance documented prior     #L. Perihilar Mass Encasing L. Pulmonary Artery  #B/L Adrenal Soft Tissues Masses   #Enlarged Portocaval Lymph Nodes   CTPE: Left upper lobe suprahilar mass with invasion into the mediastinum question of upper left pericardium involvement. Encasement of the left upper lobe pulmonary arterial branches. Enlarged subcarinal lymph node. 0.6 cm left upper lobe perifissural nodule and 0.6 cm right lower lobe pulmonary nodule.  -Extensive history of smoking, 1 pack a day since 13yrs old.  -Etiology: lung primary cancer favored w/ metastasis to the adrenals.  Plan:  -s/p bronch for LN biopsy for 8/2  -Ongoing goals of care discussion with patient and her family - now DNR/DNI status     #Right Iliac Osteolytic Lesions  #Ventral Epidural Lesion   #Pathologic T8 Compression Fracture   -history of chronic back pain, however since June reports sharp, stabbing pain w/ bilateral radiation.  - CT Abd/pel w/ IV con: lytic destructive lesion with mild pathologic compression deformity of the T8 vertebral body without retropulsion. There is  ventral epidural tumor involvement at T7 and T8 with at least moderate central canal stenosis at that level.  -No FND, bladder or bowel dysfunction. Reports UE and LE weakness, progressive, no numbness or tingling.  Plan:  -Neurosurgery consulted - planning for OR next week  -Dilaudid 0.5mg q3h PRN for severe pain   -Neuro checks Q4H      #ANTONY, Non-Oliguric   #Hypokalemia   #Hyponatremia   Pt w/ history of decreased PO intake. Suspect etiology pre-renal, however ATN cannot be excluded given hypotension    -Unclear baseline creatinine  -s/p 3L LR  Plan:  -Replete K as needed, continue to monitor closely   -Daily CMP  -avoid nephrotoxins   -renally dose medication      #Leukocytosis   -likely reactive, denies systemic signs of infection, afebrile and no evidence of infection seen on imaging.  -WBC 15.2 on admission, downtrending  Plan:  -Continue to monitor  -Daily CBC     #Anemia   #Thrombocythemia   #Intrahepatic IVC Thrombus   -No evidence of overt GI bleed. Presentation likely iso of malignancy. Patient with no history of vascular disease or history of blood clots  Plan:  -Vascular medicine consulted 8/1, appreciate recommendations              - Echocardiogram to evaluate for pericardial involvement   - LE duplex to evaluate for DVTs              - Continue heparin gtt for now   - Need to differentiate between bland tumor vs tumor + thrombus - ideally PET to evaluate     #HTN  #HLD   -Holding home HTN meds iso soft pressures since admission   -C/w Atorvastatin 40 mg once daily     #Depression   #Anxiety   -C/w home citalopram and hydroxyzine      F: PRN  E: PRN  N: Nregular  A: pIV  DVT: Heparin gtt  GI: not indicated      Bowel Regimen: Miralax  Code Status: DNR/DNI  NOK: Adan Veras (son): [phone]    Izzy Nunez MD

## 2024-08-03 NOTE — PROGRESS NOTES
"Kassandra Veras is a 58 y.o. female on day 3 of admission presenting with Mass of upper lobe of left lung.    Subjective   NAEON  S/p diagnostic EBUS 11L   Tolerated procedure well        Objective     Physical Exam  AAOx3   LFNC  No respiratory distress   CTAB, no increased effort  RRR, no murmur  Abdomen soft, non-tender, non-distended      Last Recorded Vitals  Blood pressure 93/54, pulse 72, temperature 36.3 °C (97.3 °F), resp. rate 17, height 1.676 m (5' 6\"), weight 74.4 kg (164 lb), SpO2 97%.  Intake/Output last 3 Shifts:  I/O last 3 completed shifts:  In: 400 (5.4 mL/kg) [IV Piggyback:400]  Out: - (0 mL/kg)   Dosing Weight: 74.4 kg     Relevant Results                             Assessment/Plan   Principal Problem:    Mass of upper lobe of left lung  Active Problems:    Hypokalemia    HTN (hypertension)    58yoF w/hx of active tobacco use disorder admitted for IVC thrombus incidentally found to have left hilar mass w/adrenal masses concerning concerning for metastatic lung disease. Pulmonary medicine consulted for tissue and lymph node sampling.     Please refer to initial consultation for further details [08/01/2024 - Dr. Teixeira].     In brief, patient D1 s/p diagnostic endobronchial ultrasound of Lymph Node station 11L. Rapid On-Site Evaluation [KYRSTAL] with preliminary cytology suggestive of malignancy in node level 11L. Pulmonary Medicine to await the finalization of pathology.     Case discussed and reviewed with staff attending: Dr. Reese   Thank you for involving pulmonary medicine in the care of Ms. Veras.   Pulmonary medicine to follow peripherally                 Michelle Cortez MD      "

## 2024-08-03 NOTE — PROGRESS NOTES
"Kassandra Veras is a 58 y.o. female on day 3 of admission presenting with Mass of upper lobe of left lung.    Subjective   No events overnight    Objective     Physical Exam  AOx3  RUE D5 B5 T4+ HG/IO 4+  RLE HF4+ KE5 DF/PF 5  LUE D5 B5 T5 HG/IO 5  LLE HF4+ KE5 DF/PF 5  SILT    Last Recorded Vitals  Blood pressure 105/63, pulse 77, temperature 36.1 °C (97 °F), resp. rate 17, height 1.676 m (5' 6\"), weight 74.4 kg (164 lb), SpO2 99%.  Intake/Output last 3 Shifts:  I/O last 3 completed shifts:  In: 400 (5.4 mL/kg) [IV Piggyback:400]  Out: - (0 mL/kg)   Dosing Weight: 74.4 kg     Relevant Results  Lab Results   Component Value Date    WBC 10.7 08/02/2024    HGB 7.9 (L) 08/02/2024    HCT 24.4 (L) 08/02/2024    MCV 82 08/02/2024     (H) 08/02/2024     Lab Results   Component Value Date    GLUCOSE 94 08/02/2024    CALCIUM 8.2 (L) 08/02/2024     (L) 08/02/2024    K 3.3 (L) 08/02/2024    CO2 24 08/02/2024     08/02/2024    BUN 15 08/02/2024    CREATININE 0.86 08/02/2024       Assessment/Plan   Principal Problem:    Mass of upper lobe of left lung  Active Problems:    Hypokalemia    HTN (hypertension)    Kassandra is a 58 y.o. female with h/o HTN, HLD, depression, anxiety p/w hypotension and tachycardia, RUQ US IVC thrombus, R renal mass, CT PE L perihilar mass encasing L pulmonary artery, CT AP b/l adrenal masses, R adrenal mass w/ extension into infrahepatic IVC, T8 compression fracture w 50% height loss, ventral epidural lesion, R iliac osteolytic lesion    DNR/DNI  Jose primary   Vasc med recs  Vasc surg recs  Pulm recs   OR planning week of 8/5  Will follow up bronch final path, TTE, and DVT US   Please document RCRI, medical risk stratification   Rest per primary      Amrik Tamayo MD      "

## 2024-08-04 LAB
ALBUMIN SERPL BCP-MCNC: 2.5 G/DL (ref 3.4–5)
ANION GAP SERPL CALC-SCNC: 10 MMOL/L (ref 10–20)
APTT PPP: 51 SECONDS (ref 27–38)
BUN SERPL-MCNC: 9 MG/DL (ref 6–23)
CALCIUM SERPL-MCNC: 8.5 MG/DL (ref 8.6–10.6)
CHLORIDE SERPL-SCNC: 102 MMOL/L (ref 98–107)
CO2 SERPL-SCNC: 32 MMOL/L (ref 21–32)
CORTIS SERPL-MCNC: 8 UG/DL (ref 2.5–20)
CREAT SERPL-MCNC: 0.77 MG/DL (ref 0.5–1.05)
EGFRCR SERPLBLD CKD-EPI 2021: 88 ML/MIN/1.73M*2
ERYTHROCYTE [DISTWIDTH] IN BLOOD BY AUTOMATED COUNT: 14.9 % (ref 11.5–14.5)
GLUCOSE SERPL-MCNC: 90 MG/DL (ref 74–99)
HCT VFR BLD AUTO: 27.4 % (ref 36–46)
HGB BLD-MCNC: 8.4 G/DL (ref 12–16)
MAGNESIUM SERPL-MCNC: 2.14 MG/DL (ref 1.6–2.4)
MCH RBC QN AUTO: 26.8 PG (ref 26–34)
MCHC RBC AUTO-ENTMCNC: 30.7 G/DL (ref 32–36)
MCV RBC AUTO: 87 FL (ref 80–100)
NRBC BLD-RTO: 0 /100 WBCS (ref 0–0)
PHOSPHATE SERPL-MCNC: 3.6 MG/DL (ref 2.5–4.9)
PLATELET # BLD AUTO: 641 X10*3/UL (ref 150–450)
POTASSIUM SERPL-SCNC: 3.4 MMOL/L (ref 3.5–5.3)
RBC # BLD AUTO: 3.14 X10*6/UL (ref 4–5.2)
SODIUM SERPL-SCNC: 141 MMOL/L (ref 136–145)
UFH PPP CHRO-ACNC: 0.3 IU/ML
WBC # BLD AUTO: 12.2 X10*3/UL (ref 4.4–11.3)

## 2024-08-04 PROCEDURE — 2500000002 HC RX 250 W HCPCS SELF ADMINISTERED DRUGS (ALT 637 FOR MEDICARE OP, ALT 636 FOR OP/ED)

## 2024-08-04 PROCEDURE — 2500000001 HC RX 250 WO HCPCS SELF ADMINISTERED DRUGS (ALT 637 FOR MEDICARE OP)

## 2024-08-04 PROCEDURE — 2500000004 HC RX 250 GENERAL PHARMACY W/ HCPCS (ALT 636 FOR OP/ED)

## 2024-08-04 PROCEDURE — 36415 COLL VENOUS BLD VENIPUNCTURE: CPT

## 2024-08-04 PROCEDURE — 85730 THROMBOPLASTIN TIME PARTIAL: CPT | Performed by: STUDENT IN AN ORGANIZED HEALTH CARE EDUCATION/TRAINING PROGRAM

## 2024-08-04 PROCEDURE — 2500000001 HC RX 250 WO HCPCS SELF ADMINISTERED DRUGS (ALT 637 FOR MEDICARE OP): Performed by: INTERNAL MEDICINE

## 2024-08-04 PROCEDURE — 82024 ASSAY OF ACTH: CPT

## 2024-08-04 PROCEDURE — 1200000002 HC GENERAL ROOM WITH TELEMETRY DAILY

## 2024-08-04 PROCEDURE — 99254 IP/OBS CNSLTJ NEW/EST MOD 60: CPT | Performed by: INTERNAL MEDICINE

## 2024-08-04 PROCEDURE — 82533 TOTAL CORTISOL: CPT

## 2024-08-04 PROCEDURE — 85027 COMPLETE CBC AUTOMATED: CPT

## 2024-08-04 PROCEDURE — 83735 ASSAY OF MAGNESIUM: CPT

## 2024-08-04 PROCEDURE — 84100 ASSAY OF PHOSPHORUS: CPT

## 2024-08-04 PROCEDURE — 99232 SBSQ HOSP IP/OBS MODERATE 35: CPT

## 2024-08-04 PROCEDURE — 85520 HEPARIN ASSAY: CPT

## 2024-08-04 RX ORDER — OXYCODONE HYDROCHLORIDE 5 MG/1
5 TABLET ORAL EVERY 8 HOURS PRN
Status: DISCONTINUED | OUTPATIENT
Start: 2024-08-04 | End: 2024-08-06

## 2024-08-04 RX ORDER — ACETAMINOPHEN 325 MG/1
975 TABLET ORAL EVERY 8 HOURS
Status: DISCONTINUED | OUTPATIENT
Start: 2024-08-04 | End: 2024-08-09

## 2024-08-04 RX ORDER — AMOXICILLIN 250 MG
1 CAPSULE ORAL NIGHTLY
Status: DISCONTINUED | OUTPATIENT
Start: 2024-08-04 | End: 2024-08-06

## 2024-08-04 RX ORDER — HYDROCORTISONE 10 MG/1
10 TABLET ORAL EVERY MORNING
Status: DISCONTINUED | OUTPATIENT
Start: 2024-08-05 | End: 2024-08-05

## 2024-08-04 RX ORDER — POTASSIUM CHLORIDE 20 MEQ/1
40 TABLET, EXTENDED RELEASE ORAL ONCE
Status: COMPLETED | OUTPATIENT
Start: 2024-08-04 | End: 2024-08-04

## 2024-08-04 RX ORDER — HYDROCORTISONE 5 MG/1
5 TABLET ORAL
Status: DISCONTINUED | OUTPATIENT
Start: 2024-08-04 | End: 2024-08-05

## 2024-08-04 RX ORDER — HYDROCORTISONE 5 MG/1
5 TABLET ORAL
Status: DISCONTINUED | OUTPATIENT
Start: 2024-08-05 | End: 2024-08-05

## 2024-08-04 RX ORDER — POTASSIUM CHLORIDE 20 MEQ/1
20 TABLET, EXTENDED RELEASE ORAL ONCE
Status: COMPLETED | OUTPATIENT
Start: 2024-08-04 | End: 2024-08-04

## 2024-08-04 RX ORDER — OXYCODONE HCL 10 MG/1
10 TABLET, FILM COATED, EXTENDED RELEASE ORAL EVERY 12 HOURS SCHEDULED
Status: DISCONTINUED | OUTPATIENT
Start: 2024-08-04 | End: 2024-08-09

## 2024-08-04 ASSESSMENT — COGNITIVE AND FUNCTIONAL STATUS - GENERAL
WALKING IN HOSPITAL ROOM: A LITTLE
MOBILITY SCORE: 20
DRESSING REGULAR LOWER BODY CLOTHING: A LITTLE
CLIMB 3 TO 5 STEPS WITH RAILING: A LITTLE
DAILY ACTIVITIY SCORE: 23
STANDING UP FROM CHAIR USING ARMS: A LITTLE
CLIMB 3 TO 5 STEPS WITH RAILING: A LITTLE
DAILY ACTIVITIY SCORE: 23
MOVING TO AND FROM BED TO CHAIR: A LITTLE
DRESSING REGULAR LOWER BODY CLOTHING: A LITTLE
MOBILITY SCORE: 22
WALKING IN HOSPITAL ROOM: A LITTLE

## 2024-08-04 ASSESSMENT — PAIN - FUNCTIONAL ASSESSMENT
PAIN_FUNCTIONAL_ASSESSMENT: 0-10

## 2024-08-04 ASSESSMENT — PAIN SCALES - GENERAL
PAINLEVEL_OUTOF10: 9
PAINLEVEL_OUTOF10: 0 - NO PAIN
PAINLEVEL_OUTOF10: 0 - NO PAIN
PAINLEVEL_OUTOF10: 9
PAINLEVEL_OUTOF10: 10 - WORST POSSIBLE PAIN
PAINLEVEL_OUTOF10: 8

## 2024-08-04 ASSESSMENT — PAIN DESCRIPTION - LOCATION
LOCATION: BACK
LOCATION: BACK

## 2024-08-04 ASSESSMENT — PAIN SCALES - WONG BAKER: WONGBAKER_NUMERICALRESPONSE: HURTS WHOLE LOT

## 2024-08-04 NOTE — PROGRESS NOTES
"Kassandra Veras is a 58 y.o. female on hospital day 4 of admission presenting with Mass of upper lobe of left lung.    Subjective   NAEON. States pain overall well controlled. Family coming to visit today to continue GOC discussion.          Objective     Physical Exam  Vitals reviewed.   Constitutional:       General: She is awake. She is not in acute distress.     Appearance: Normal appearance.   Eyes:      Extraocular Movements: Extraocular movements intact.   Cardiovascular:      Rate and Rhythm: Normal rate and regular rhythm.      Heart sounds: Normal heart sounds. No murmur heard.  Pulmonary:      Effort: Pulmonary effort is normal. No respiratory distress.      Breath sounds: Normal breath sounds.   Abdominal:      General: Abdomen is flat. Bowel sounds are normal.      Palpations: Abdomen is soft.      Tenderness: There is no abdominal tenderness.   Skin:     General: Skin is warm and dry.   Neurological:      Mental Status: She is alert. Mental status is at baseline.   Psychiatric:         Behavior: Behavior is cooperative.         Last Recorded Vitals  Blood pressure 103/65, pulse 81, temperature 36.3 °C (97.3 °F), resp. rate 16, height 1.676 m (5' 6\"), weight 74.4 kg (164 lb), SpO2 99%.  Intake/Output last 24h:  No intake or output data in the 24 hours ending 08/04/24 0945    Relevant Results  Results from last 72 hours   Lab Units 08/04/24  0726 08/03/24  1054 08/02/24  0902   WBC AUTO x10*3/uL 12.2* 17.7* 10.7   HEMOGLOBIN g/dL 8.4* 8.0* 7.9*   HEMATOCRIT % 27.4* 25.3* 24.4*   PLATELETS AUTO x10*3/uL 641* 644* 579*   SODIUM mmol/L 141 135* 135*   POTASSIUM mmol/L 3.4* 3.3* 3.3*   CHLORIDE mmol/L 102 98 100   CO2 mmol/L 32 29 24   BUN mg/dL 9 14 15   CREATININE mg/dL 0.77 0.71 0.86   GLUCOSE mg/dL 90 108* 94   CALCIUM mg/dL 8.5* 8.5* 8.2*   MAGNESIUM mg/dL 2.14  --  2.17   PHOSPHORUS mg/dL 3.6 3.0 4.2   ALBUMIN g/dL 2.5* 2.7* 2.4*     Scheduled medications  acetaminophen, 975 mg, oral, q8h  atorvastatin, 40 " mg, oral, Daily  citalopram, 20 mg, oral, Daily  hydrOXYzine HCL, 25 mg, oral, Daily  polyethylene glycol, 17 g, oral, Daily      Continuous medications  heparin, 0-4,500 Units/hr, Last Rate: 1,700 Units/hr (08/04/24 0845)      PRN medications  PRN medications: heparin, HYDROmorphone, oxyCODONE         Assessment/Plan   Ms Veras is a 58 year old F, w/ PMHx of HLD, HTN, depression, anxiety, presents to  ED at the advice of her physician due to US findings of IVC and right renal vein thrombus. Imaging findings consistent with significant metastatic disease identified in adrenal glands, spine, and left upper lobe lung, unclear origin, however high suspicion for primary lung cancer.      #L. Perihilar Mass Encasing L. Pulmonary Artery  #B/L Adrenal Soft Tissues Masses   #Enlarged Portocaval Lymph Nodes   CTPE: Left upper lobe suprahilar mass with invasion into the mediastinum question of upper left pericardium involvement. Encasement of the left upper lobe pulmonary arterial branches. Enlarged subcarinal lymph node. 0.6 cm left upper lobe perifissural nodule and 0.6 cm right lower lobe pulmonary nodule.  -Extensive history of smoking, 1 pack a day since 13yrs old.  -Etiology: lung primary cancer favored w/ metastasis to the adrenals.  Plan:  -s/p bronch for LN biopsy for 8/2  -Ongoing goals of care discussion with patient and her family - now DNR/DNI status     #Right Iliac Osteolytic Lesions  #Ventral Epidural Lesion   #Pathologic T8 Compression Fracture   -history of chronic back pain, however since June reports sharp, stabbing pain w/ bilateral radiation.  - CT Abd/pel w/ IV con: lytic destructive lesion with mild pathologic compression deformity of the T8 vertebral body without retropulsion. There is  ventral epidural tumor involvement at T7 and T8 with at least moderate central canal stenosis at that level.  -No FND, bladder or bowel dysfunction. Reports UE and LE weakness, progressive, no numbness or  tingling.  Plan:  -Neurosurgery consulted - planning for OR next week; see risk stratification below  - Given soft BP and bilateral adrenal masses, endocrinology consult for pre-surgical optimization/possible stress dose steroids  -Oxycontin 10mg q12 hrs + PRN dilaudid 0.5 + oxycodone 5mg q8 for pain - will plan for palliative care consult Monday as patient expressed wish to go to hospice after neurosurgery  -Neuro checks Q4H     #Pre-operative risk stratification  - Patient is class I risk level with 3.9% RCRI score.   - Patient is medically optimized for surgery, no further interventions needed at this time.      #Hypokalemia   #Hyponatremia   -ANTONY on admission, now resolved; unclear baseline creatinine  Plan:  -Replete K as needed, continue to monitor closely   -Daily CMP     #Leukocytosis   -likely reactive, denies systemic signs of infection, afebrile and no evidence of infection seen on imaging.  -WBC 15.2 on admission, downtrending  Plan:  -Continue to monitor  -Daily CBC     #Anemia   #Thrombocythemia   #Intrahepatic IVC Thrombus   -No evidence of overt GI bleed. Presentation likely iso of malignancy. Patient with no history of vascular disease or history of blood clots  Plan:  -Vascular medicine consulted 8/1, appreciate recommendations              - LE duplex to evaluate for DVTs - pending  - Echo 8/3 with concern for IVC thrombus              - Continue heparin gtt for now   - Need to differentiate between bland tumor vs tumor + thrombus - ideally PET to evaluate     #HTN  #HLD   -Holding home HTN meds iso soft pressures since admission   -C/w Atorvastatin 40 mg once daily     #Depression   #Anxiety   -C/w home citalopram and hydroxyzine      F: PRN  E: PRN  N: Nregular  A: pIV  DVT: Heparin gtt  GI: not indicated      Bowel Regimen: Miralax  Code Status: DNR/DNI  NOK: Adan Veras (son): [phone]       Patient seen and staffed with attending physician. Recommendations not final until attested.    Marc Montez MD

## 2024-08-04 NOTE — PROGRESS NOTES
Kassandra Veras is a 58 y.o. female on day 4 of admission presenting with Mass of upper lobe of left lung.    Transitional Care Coordination Progress Note:  Patient discussed during interdisciplinary rounds.   Team members present: MD and TCC  Plan per Medical/Surgical team: Patient pending NSYG Intervention sometime this week.  Payor: [facility]  Discharge disposition: Home  Potential Barriers: None  ADOD: 08/08/24      MIGUEL A CHOI RN

## 2024-08-04 NOTE — CONSULTS
Inpatient consult to Endocrinology  Consult performed by: Sameer Roque MD  Consult ordered by: Martín Eckert MD  Reason for consult: Concerns for Adrenal Insufficiency  Assessment/Recommendations: Concerns for Adrenal Insufficiency        Reason For Consult  Concerns for Adrenal Insufficiency    History Of Present Illness  Kassandra Veras is a 58 y.o. 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 Endocrine is consulted on 8/4/24  in the lights of b/l adrenal findings and hypotension for preop optimization - s. Fusion.     Patient reports that her BP generally ran high - on lisinopril and amlodipine. However, in the past 2 months - has been low. She does endorse   Reports loss of balance.   No falls.   Endorses weight loss of 30 pounds for the past 2.5 months.   Endorsing back pain.   No hyperpigmentation.   No Hx of stroke or TIA.   No TCAs or  inhalers.   No Headaches, palpitations, and paresthesias.   Endorses hx of heavy nicotine use disorder.     Hospital course: No prednisone, no Hydrocortisone, no Methylprednisone.   Past Medical History  She has a past medical history of Hyperlipidemia and Hypertension.    Surgical History  She has no past surgical history on file.     Social History  She reports that she has been smoking cigarettes. She has never used smokeless tobacco. No history on file for alcohol use and drug use.    Family History  No family history on file.     Allergies  Patient has no known allergies.    Review of Systems As above      Physical Exam  Vitals:    08/04/24 0937   BP: 103/65   Pulse: 81   Resp: 16   Temp: 36.3 °C (97.3 °F)   SpO2: 99%  "  Constitutional:       General: She is awake. She is not in acute distress.     Appearance: Normal appearance.   HEENT:     Extraocular Movements: Extraocular movements intact, No palpable thyroid nodules.   Cardiovascular:      Rate and Rhythm: Normal rate and regular rhythm.      Heart sounds: Normal heart sounds. No murmur heard.  Pulmonary:      Effort: Pulmonary effort is normal. No respiratory distress.      Breath sounds: Normal breath sounds.   Abdominal:      General: Abdomen is flat. Bowel sounds are normal.      Palpations: Abdomen is soft.      Tenderness: There is no abdominal tenderness.   Skin:     General: Skin is warm and dry.   Neurological:      Mental Status: She is alert. Mental status is at baseline.  No delay in relaxation phase.   Psychiatric:         Behavior: Behavior is cooperative.       ROS, PMH, FH/SH, surgical history and allergies have been reviewed.    Last Recorded Vitals  Blood pressure 103/65, pulse 81, temperature 36.3 °C (97.3 °F), resp. rate 16, height 1.676 m (5' 6\"), weight 74.4 kg (164 lb), SpO2 99%.    Relevant Results  Results from last 7 days   Lab Units 08/04/24  0726 08/03/24  1054 08/02/24  0902 08/01/24  1836 08/01/24  1125   GLUCOSE mg/dL 90 108* 94 94 91         CT of the abdomen and pelvis was performed.  Standard contiguous  axial images were obtained at 3 mm slice thickness through the  abdomen and pelvis. Coronal and sagittal reconstructions at 3 mm  slice thickness were performed.      100 ml of contrast Omnipaque 350 were administered intravenously  without immediate complication.      FINDINGS:  LOWER CHEST:  Please see dedicated CT angio of the chest for further  characterization of findings. The thoracic vertebral body findings  are described in current examination.      ABDOMEN:      LIVER:  Normal liver enhancement and contour.      BILE DUCTS:  The intrahepatic and extrahepatic ducts are not dilated.      GALLBLADDER:  The gallbladder is nondistended " and without evidence of radiopaque  stones.      PANCREAS:  The pancreas appears unremarkable without evidence of ductal  dilatation or masses. Few scattered calcification of the pancreatic  head.      SPLEEN:  The spleen is normal in size without focal lesions.      ADRENAL GLANDS:  Bilateral adrenal masses predominantly soft tissue attenuation with  central necrosis, largest in the right measuring 8.3 x 7.1 x 9.1 cm  (series 501, image 55) /series 502, image 66) abutting the superior  pole of the right kidney and inferior medial aspect of the right  hepatic lobe (series 502, image 63/series 501, image 41). The left  adrenal mass measures 4.7 x 4.1 x 4.1 cm (series 502, image 63) and  effaces the superior pole of the left kidney (series 502, image 63).  The superior aspect of the lesion effaces the splenic artery (series  501, image 46).      KIDNEYS AND URETERS:  Bilateral adrenal masses as described above with effacement of the  superior pole of the bilateral kidneys. No suspicious renal lesions.  A few small renal cortical hypodensities, likely cysts. No evidence  of hydroureter nephrosis or nephroureterolithiasis.      PELVIS:      BLADDER:  The urinary bladder is decompressed, limited for evaluation.      REPRODUCTIVE ORGANS:  The prostate is not enlarged.      BOWEL:  The stomach, small and large bowel are normal in appearance without  wall thickening or obstruction.   The small and large bowel are  normal in caliber and demonstrate no wall thickening.   Normal  appendix.      VESSELS:  Soft tissue mass extension into the infrahepatic IVC (series 501,  image 41 with estimated 4.4 cm of superior extension of the mass just  inferior to the bifurcation of the hepatic veins. There is no  aneurysmal dilatation of the abdominal aorta      PERITONEUM/RETROPERITONEUM/LYMPH NODES:  Peritoneal soft tissue nodules adjacent to the right psoas  muscle/right renal saranya measuring 1.1 cm (series 501, image 78 and  along the  right posterior renal fascia (series 501, image 85  measuring 1.0 cm) which could reflect peritoneal metastatic deposits.  There is an additional 1.2 cm soft tissue nodule adjacent to the  inferior pole of the left kidney (series 501, image 49). Additional  enlarged portacaval lymph nodes for reference measuring 1.5 x 1.4 cm  (series 501, image 42) and 1.3 x 1.2 cm (series 501, image 39). No  evidence of intra-abdominal ascites or loculated fluid collection. No  evidence of free peritoneal air.      BONES AND ABDOMINAL WALL:  Mixed lytic/sclerotic lesion involving the right iliac bone measuring  1.9 x 1.1 by 2.0 cm (series 501, image 111/series 502, image 70).      Lytic/destructive lesion involving the T8 vertebral body with mild  pathologic compression deformity without retropulsion. (Series 503,  image 82). There is additionally enhancing ventral epidural tumor at  the level of T7 and T8, 7-8 mm in thickness, causing at least  moderate central canal stenosis, for example axial image 11 and  sagittal image 83.      Abdominal wall soft tissue nodules measuring 1.8 and 1.7 cm (series  501 images 125 and 129, which are also concerning for metastatic  nodules. Additionally right posterior flank subcutaneous nodule  measuring 1.1 cm on axial image 85.      IMPRESSION:  1. Bilateral adrenal soft tissue masses with central necrosis  concerning for metastatic lesions. The large adrenal mass exerts some  mass effect on the adjacent kidney. There is tumor thrombus extending  from the right adrenal into the intrahepatic IVC with some superior  extension to the level of hepatic IVC.  2. Several soft tissue peritoneal deposits involving the pararenal  space and retroperitoneum as detailed above which are also suspicious  for metastatic disease.  3. Enlarged portacaval lymph nodes concerning for metastatic disease  involvement.  4. Lytic destructive lesion with mild pathologic compression  deformity of the T8 vertebral body  without retropulsion. There is  ventral epidural tumor involvement at T7 and T8 with at least  moderate central canal stenosis at that level. Well-circumscribed  bone marrow lesion of the right iliac bone, which is nonspecific and  may be benign or malignant.  5. Several subcutaneous nodules are also suspicious for metastatic  disease.  Current Facility-Administered Medications:     acetaminophen (Tylenol) tablet 975 mg, 975 mg, oral, q8h, Martín Eckert MD, 975 mg at 08/04/24 0913    atorvastatin (Lipitor) tablet 40 mg, 40 mg, oral, Daily, Savannah Young MD, 40 mg at 08/04/24 0804    citalopram (CeleXA) tablet 20 mg, 20 mg, oral, Daily, Savannah Young MD, 20 mg at 08/04/24 0804    heparin 25,000 Units in dextrose 5% 250 mL (100 Units/mL) infusion (premix), 0-4,500 Units/hr, intravenous, Continuous, Marc Montez MD, Last Rate: 17 mL/hr at 08/04/24 0845, 1,700 Units/hr at 08/04/24 0845    heparin bolus from bag 3,000-6,000 Units, 3,000-6,000 Units, intravenous, q4h PRN, Savannah Young MD, 3,000 Units at 08/02/24 2144    HYDROmorphone (Dilaudid) injection 0.5 mg, 0.5 mg, intravenous, q4h PRN, Magda Palafox MD, 0.5 mg at 08/03/24 1829    hydrOXYzine HCL (Atarax) tablet 25 mg, 25 mg, oral, Daily, Savannah Young MD, 25 mg at 08/04/24 0804    oxyCODONE (Roxicodone) immediate release tablet 5 mg, 5 mg, oral, q8h PRN, Liane Wang MD    oxyCODONE ER (OxyCONTIN) 12 hr tablet 10 mg, 10 mg, oral, q12h PATRICE, Martín Eckert MD    polyethylene glycol (Glycolax, Miralax) packet 17 g, 17 g, oral, Daily, Savannah Young MD, 17 g at 08/04/24 0805    sennosides-docusate sodium (Indira-Colace) 8.6-50 mg per tablet 1 tablet, 1 tablet, oral, Nightly, Liane Wang MD      Assessment/Plan   Principal Problem:    Mass of upper lobe of left lung  Active Problems:    Hypokalemia    HTN (hypertension)  Kassandra Veras is a 58 y.o. female with PMHx of HLD, HTN, depression, anxiety  ED for work up after U/S findings of IVC and right renal  vein thrombus- also in the light of significant metastatic disease in adrenal glands, spine -pathological T8 vb fx with lytic findings, epidural involvement, and left upper lobe lung, unclear origin, with  high suspicion for primary lung cancer s/p bronch for LN biopsy for 8/2. Endocrine is consulted on 8/4/24  in the lights of b/l adrenal findings and hypotension for preop optimization - s. Fusion.     Patient reports that her BP generally ran high - on lisinopril and amlodipine. However, in the past 2 months - has been low. She does endorse   Reports loss of balance.   No falls.   Endorses weight loss of 30 pounds for the past 2.5 months.   Endorsing back pain.   No hyperpigmentation.   No Hx of stroke or TIA.   No TCAs or  inhalers.   No Headaches, palpitations, and paresthesias.   Endorses hx of heavy nicotine use disorder.     Hospital course: No prednisone, no Hydrocortisone, no Methylprednisone.     On imaging: Bilateral adrenal soft tissue masses with central necrosis  concerning for metastatic lesions. The large adrenal mass exerts some  mass effect on the adjacent kidney. There is tumor thrombus extending  from the right adrenal into the intrahepatic IVC with some superior  extension to the level of hepatic IVC.    Cortisol on 7/31 - 28 at 11;50 PM  Repeat Cortisol on 8/4 - 8 at 11 AM  No previous inpatient pred/solumedrol/hydrocortisone.     Endocrine recommendations 8/4/24:   In the light of the cortisol of 8 at 11 AM and also given the size and bilaterally of the adrenal masses -risk of hemorrhage/heparin drip as well.   Patient can be empirically started on Hydrocortisone of 10 mg in AM- 5 mg at noon - 5 mg at 4 pm.   Patient will likely require stress dose of HC preoperatively as well.   Endocrine will follow.   Endocrine pager [phone].  Plan communicated with Primary Team.   Patient is discusses, seen, and examined with Dr. Sellers who agrees with the management plan.   Sameer Roque MD

## 2024-08-04 NOTE — PROGRESS NOTES
"Kassandra Veras is a 58 y.o. female on day 4 of admission presenting with Mass of upper lobe of left lung.    Subjective   No events overnight    Objective     Physical Exam  AOx3  RUE D5 B5 T4+ HG/IO 4+  RLE HF4+ KE5 DF/PF 5  LUE D5 B5 T5 HG/IO 5  LLE HF4+ KE5 DF/PF 5  SILT    Last Recorded Vitals  Blood pressure 103/65, pulse 81, temperature 36.3 °C (97.3 °F), resp. rate 16, height 1.676 m (5' 6\"), weight 74.4 kg (164 lb), SpO2 99%.  Intake/Output last 3 Shifts:  No intake/output data recorded.    Relevant Results  Lab Results   Component Value Date    WBC 12.2 (H) 08/04/2024    HGB 8.4 (L) 08/04/2024    HCT 27.4 (L) 08/04/2024    MCV 87 08/04/2024     (H) 08/04/2024     Lab Results   Component Value Date    GLUCOSE 90 08/04/2024    CALCIUM 8.5 (L) 08/04/2024     08/04/2024    K 3.4 (L) 08/04/2024    CO2 32 08/04/2024     08/04/2024    BUN 9 08/04/2024    CREATININE 0.77 08/04/2024       Assessment/Plan   Principal Problem:    Mass of upper lobe of left lung  Active Problems:    Hypokalemia    HTN (hypertension)    Kassandra is a 58 y.o. female with h/o HTN, HLD, depression, anxiety p/w hypotension and tachycardia, RUQ US IVC thrombus, R renal mass, CT PE L perihilar mass encasing L pulmonary artery, CT AP b/l adrenal masses, R adrenal mass w/ extension into infrahepatic IVC, T8 compression fracture w 50% height loss, ventral epidural lesion, R iliac osteolytic lesion    DNR/DNI  Jose primary   Vasc med recs  Vasc surg recs  Pulm recs   OR planning week of 8/5  If palliative care is consulted Monday, please inform neurosurgery of time in order to be a part of the discussion  Will follow up bronch final path and DVT US   Rest per primary      Amrik Tamayo MD      "

## 2024-08-05 VITALS
RESPIRATION RATE: 16 BRPM | WEIGHT: 164 LBS | TEMPERATURE: 97 F | BODY MASS INDEX: 26.36 KG/M2 | SYSTOLIC BLOOD PRESSURE: 101 MMHG | OXYGEN SATURATION: 96 % | HEART RATE: 85 BPM | DIASTOLIC BLOOD PRESSURE: 70 MMHG | HEIGHT: 66 IN

## 2024-08-05 PROBLEM — C80.1 MALIGNANCY (MULTI): Status: ACTIVE | Noted: 2024-07-31

## 2024-08-05 LAB
ALBUMIN SERPL BCP-MCNC: 2.5 G/DL (ref 3.4–5)
ANION GAP SERPL CALC-SCNC: 11 MMOL/L (ref 10–20)
BUN SERPL-MCNC: 8 MG/DL (ref 6–23)
CALCIUM SERPL-MCNC: 8.6 MG/DL (ref 8.6–10.6)
CHLORIDE SERPL-SCNC: 104 MMOL/L (ref 98–107)
CO2 SERPL-SCNC: 31 MMOL/L (ref 21–32)
CREAT SERPL-MCNC: 0.7 MG/DL (ref 0.5–1.05)
EGFRCR SERPLBLD CKD-EPI 2021: >90 ML/MIN/1.73M*2
ERYTHROCYTE [DISTWIDTH] IN BLOOD BY AUTOMATED COUNT: 14.7 % (ref 11.5–14.5)
GLUCOSE SERPL-MCNC: 101 MG/DL (ref 74–99)
HCT VFR BLD AUTO: 26.9 % (ref 36–46)
HGB BLD-MCNC: 8.7 G/DL (ref 12–16)
MAGNESIUM SERPL-MCNC: 1.97 MG/DL (ref 1.6–2.4)
MCH RBC QN AUTO: 27.2 PG (ref 26–34)
MCHC RBC AUTO-ENTMCNC: 32.3 G/DL (ref 32–36)
MCV RBC AUTO: 84 FL (ref 80–100)
NRBC BLD-RTO: 0 /100 WBCS (ref 0–0)
PHOSPHATE SERPL-MCNC: 3.2 MG/DL (ref 2.5–4.9)
PLATELET # BLD AUTO: 718 X10*3/UL (ref 150–450)
POTASSIUM SERPL-SCNC: 3.8 MMOL/L (ref 3.5–5.3)
RBC # BLD AUTO: 3.2 X10*6/UL (ref 4–5.2)
SODIUM SERPL-SCNC: 142 MMOL/L (ref 136–145)
UFH PPP CHRO-ACNC: 0.3 IU/ML
WBC # BLD AUTO: 14.5 X10*3/UL (ref 4.4–11.3)

## 2024-08-05 PROCEDURE — 2500000001 HC RX 250 WO HCPCS SELF ADMINISTERED DRUGS (ALT 637 FOR MEDICARE OP)

## 2024-08-05 PROCEDURE — 1200000002 HC GENERAL ROOM WITH TELEMETRY DAILY

## 2024-08-05 PROCEDURE — 2500000004 HC RX 250 GENERAL PHARMACY W/ HCPCS (ALT 636 FOR OP/ED)

## 2024-08-05 PROCEDURE — 85520 HEPARIN ASSAY: CPT

## 2024-08-05 PROCEDURE — 2500000002 HC RX 250 W HCPCS SELF ADMINISTERED DRUGS (ALT 637 FOR MEDICARE OP, ALT 636 FOR OP/ED)

## 2024-08-05 PROCEDURE — 99233 SBSQ HOSP IP/OBS HIGH 50: CPT

## 2024-08-05 PROCEDURE — 84100 ASSAY OF PHOSPHORUS: CPT

## 2024-08-05 PROCEDURE — 36415 COLL VENOUS BLD VENIPUNCTURE: CPT

## 2024-08-05 PROCEDURE — 2500000001 HC RX 250 WO HCPCS SELF ADMINISTERED DRUGS (ALT 637 FOR MEDICARE OP): Performed by: INTERNAL MEDICINE

## 2024-08-05 PROCEDURE — 99233 SBSQ HOSP IP/OBS HIGH 50: CPT | Performed by: INTERNAL MEDICINE

## 2024-08-05 PROCEDURE — 99255 IP/OBS CONSLTJ NEW/EST HI 80: CPT | Performed by: INTERNAL MEDICINE

## 2024-08-05 PROCEDURE — 99498 ADVNCD CARE PLAN ADDL 30 MIN: CPT | Performed by: INTERNAL MEDICINE

## 2024-08-05 PROCEDURE — 99497 ADVNCD CARE PLAN 30 MIN: CPT | Performed by: INTERNAL MEDICINE

## 2024-08-05 PROCEDURE — 83735 ASSAY OF MAGNESIUM: CPT

## 2024-08-05 PROCEDURE — 85027 COMPLETE CBC AUTOMATED: CPT

## 2024-08-05 RX ORDER — COSYNTROPIN 0.25 MG/ML
250 INJECTION, POWDER, FOR SOLUTION INTRAMUSCULAR; INTRAVENOUS ONCE
Status: COMPLETED | OUTPATIENT
Start: 2024-08-06 | End: 2024-08-06

## 2024-08-05 ASSESSMENT — COGNITIVE AND FUNCTIONAL STATUS - GENERAL
MOBILITY SCORE: 20
STANDING UP FROM CHAIR USING ARMS: A LITTLE
WALKING IN HOSPITAL ROOM: A LITTLE
DAILY ACTIVITIY SCORE: 23
CLIMB 3 TO 5 STEPS WITH RAILING: A LITTLE
MOVING TO AND FROM BED TO CHAIR: A LITTLE
DRESSING REGULAR LOWER BODY CLOTHING: A LITTLE

## 2024-08-05 ASSESSMENT — PAIN DESCRIPTION - LOCATION
LOCATION: BACK

## 2024-08-05 ASSESSMENT — PAIN SCALES - GENERAL
PAINLEVEL_OUTOF10: 9
PAINLEVEL_OUTOF10: 10 - WORST POSSIBLE PAIN
PAINLEVEL_OUTOF10: 7

## 2024-08-05 NOTE — CARE PLAN
The patient's goals for the shift include        Problem: Pain  Goal: Takes deep breaths with improved pain control throughout the shift  Outcome: Progressing  Goal: Turns in bed with improved pain control throughout the shift  Outcome: Progressing  Goal: Walks with improved pain control throughout the shift  Outcome: Progressing     Problem: Respiratory  Goal: Clear secretions with interventions this shift  Outcome: Progressing  Goal: Minimize anxiety/maximize coping throughout shift  Outcome: Progressing  Goal: Tolerate pulmonary toileting this shift  Outcome: Progressing  Goal: Wean oxygen to maintain O2 saturation per order/standard this shift  Outcome: Progressing  Goal: Increase self care and/or family involvement in next 24 hours  Outcome: Progressing     Problem: Skin  Goal: Decreased wound size/increased tissue granulation at next dressing change  Outcome: Progressing  Goal: Participates in plan/prevention/treatment measures  Outcome: Progressing  Goal: Prevent/manage excess moisture  Outcome: Progressing  Goal: Prevent/minimize sheer/friction injuries  Outcome: Progressing  Goal: Promote/optimize nutrition  Outcome: Progressing  Goal: Promote skin healing  Outcome: Progressing

## 2024-08-05 NOTE — CONSULTS
SUPPORTIVE AND PALLIATIVE ONCOLOGY CONSULT    Inpatient consult to [facility] Adult Supportive Oncology  Consult performed by: Halina Pham MD  Consult ordered by: Martín Eckert MD        SERVICE DATE: 2024      PALLIATIVE MEDICINE OUTPATIENT PROVIDER:  None  CURRENT ATTENDING PROVIDER: Martín Eckert MD     Medical Oncologist: No care team member to display   Radiation Oncologist: No care team member to display  Primary Physician: Agustin Yuan  [phone]    REASON FOR CONSULT/CHIEF CONSULT COMPLAINT: pain management, Introduction to Supportive and Palliative Oncology Services, and goals of care discussion    Subjective   HISTORY OF PRESENT ILLNESS: Kassandra Veras is a 58 y.o. female with h/o depression, anxiety admitted with IVC and right renal vein thrombus. Work up showed significant metastatic disease in adrenal glands, spine, and left upper lobe lung, unclear origin, suspicion for primary lung cancer  s/p LN biopsy . Pt's  recently  of cancer and he underwent chem. Per team pts may prefer to avoid chemo and mentioning hospice. Neurosurg plans OR this week for T8 compression fracture,T6-T10 fusion for  Supportive onc consulted for intro to services, and goc and pain management.   DNR/DNI  CT PE L perihilar mass encasing L pulmonary artery, CT AP b/l adrenal masses, R adrenal mass w/ extension into infrahepatic IVC, T8 compression fracture w 50% height loss, ventral epidural lesion, R iliac osteolytic lesion     , Plaints of chronic mid back pain since  secondary to arthritis for which she was given physical therapy.  Now complains of acute back pain mid back past 1 week 10 out of 10 sharp radiating in a bandlike fashion in front aggravated by moving and partially relieved by resting.  She has been taking ibuprofen 200 mg 6 tablets a day at home which brings the pain down to 4/10 and he she is able to sleep.  Difficulty occasionally breathing  No other complaints      Pain Assessment:  Onset:   Weeks  Location:  Mid back  Duration: Constant  Characteristics:   Rating: 10   Descriptors: sharp   Aggravating: movement    Relieving:  Resting   Intolerances:Kassandra Masker has No Known Allergies.   Personal Pain Goal: 4    Interference with Function: Somewhat   Coping Strategies: relaxation   Emotional Response: None   Barriers to Pain Management: None    Opioid Use  Past 24 h opioid use:   Oxycontin 10 mg po bid=3 doses=30 mg =37.5 mg ome  oxycodone ir 5 mg q 8 hrs- 1 dose=5 mg=6 mg ome  Dilauaid 0.5 mg iv q 4 hrs prn-1 dose/24 hrs=6.25 ome  Total 24h OME use:  50 mg ome    Note: OME calculations based on equianalgesic table below. Please note this table is based on best available evidence but conversions are still approximate. These are NOT opioid DOSES for individual patient use; this is equivalency information.  Drug Parenteral Enteral   Morphine 10 25   Oxycodone N/A 20   Hydromorphone 2 5   Fentanyl 0.15 N/A   Tramadol N/A 120   Citation: Camila PANIAGUA. Demystifying opioid conversion calculations: A guide for effective dosing, Second edition. [place]: [employer] of [employer]-System Pharmacists, 2018.    OARRS/PDMP reviewed no aberrant behavior noted.    Symptom Assessment:    Pain: As above  Headache: none  Dizziness:none  Lack of energy: a little  Difficulty sleeping: none  Worrying: none  Anxiety: none  Depression: none  Pain in mouth/swallowing: none  Dry mouth: none  Taste changes: none  Shortness of breath: a little  Lack of appetite: none   Nausea: none  Vomiting: none  Constipation: somewhat last bowel movement couple days ago  Diarrhea: none  Sore muscles: none  Numbness or tingling in hands/feet/other: none  Weight loss: none  Other: none        Information obtained from: chart review, interview of patient, and discussion with primary team  ______________________________________________________________________     Oncology History    No history exists.       Past Medical History:   Diagnosis  Date    Hyperlipidemia     Hypertension      History reviewed. No pertinent surgical history.  No family history on file.     SOCIAL HISTORY:  .    in .  Has 2 adult daughters and 2 adult sons.  Lives with son and his family.  1 daughter lives out of town and other 2 children live close by and are involved in her care  Independent with ADLs however family helps with IADLs  Enjoys fishing  Social History:  reports that she has been smoking cigarettes. She has never used smokeless tobacco.    REVIEW OF SYSTEMS:  Review of systems negative unless noted in HPI.       Objective   Results from last 7 days   Lab Units 24  0818 24  0726 24  1054 24  0902 24  1125   WBC AUTO x10*3/uL 14.5* 12.2* 17.7* 10.7 13.0*   HEMOGLOBIN g/dL 8.7* 8.4* 8.0* 7.9* 9.1*   HEMATOCRIT % 26.9* 27.4* 25.3* 24.4* 29.2*   PLATELETS AUTO x10*3/uL 718* 641* 644* 579* 566*   NEUTROS PCT AUTO %  --   --  80.9 74.4 75.6   LYMPHS PCT AUTO %  --   --  10.6 15.0 15.0   MONOS PCT AUTO %  --   --  7.0 7.5 6.2   EOS PCT AUTO %  --   --  0.0 1.5 1.5       Results from last 7 days   Lab Units 24  0818 24  0726 24  1054 24  0402 24  2312 24  1750   SODIUM mmol/L 142 141 135*   < > 131* 134*   POTASSIUM mmol/L 3.8 3.4* 3.3*   < > 3.3* 2.7*   CHLORIDE mmol/L 104 102 98   < > 97* 94*   CO2 mmol/L 31 32 29   < > 22 26   BUN mg/dL 8 9 14   < > 18 18   CREATININE mg/dL 0.70 0.77 0.71   < > 1.29* 1.49*   CALCIUM mg/dL 8.6 8.5* 8.5*   < > 8.0* 8.7   PROTEIN TOTAL g/dL  --   --   --   --  6.1* 7.3   BILIRUBIN TOTAL mg/dL  --   --   --   --  0.6 0.8   ALK PHOS U/L  --   --   --   --  154* 188*   ALT U/L  --   --   --   --  11 16   AST U/L  --   --   --   --  14 25   GLUCOSE mg/dL 101* 90 108*   < > 109* 121*    < > = values in this interval not displayed.         Estimated Creatinine Clearance: 80.1 mL/min (by C-G formula based on SCr of 0.77 mg/dL).     Transthoracic  Echo (TTE) Complete   Final Result      MR thoracic spine w and wo IV contrast   Final Result   MRI BRAIN:   1. No evidence of acute infarct, intracranial mass effect or midline   shift.   2. No evidence of intracranial metastatic disease within the   limitation of patient motion artifact.   3. There is a T2 hyperintense peripherally enhancing nodule within   the left parotid gland measuring 1 cm which may represent a cystic or   necrotic lymph node versus a primary parotid lesion.             MRI CERVICAL, THORACIC, & LUMBAR SPINE:        Cervical spine:        1. Heterogeneous bone marrow signal throughout the cervical spine   without definite evidence of a focal bone marrow replacing lesion.   2.   No abnormal signal or enhancement within the cervical cord.   3.   Mild degenerative changes without significant spinal canal   stenosis. Neural foraminal narrowing as detailed above. No cervical   cord compression.   4.   Borderline level 2 lymph nodes are nonspecific.             Thoracic spine:   1. Near-complete marrow replacement of the T8 vertebral body with   avid enhancement and acute compression deformity with approximately   30% vertebral body height loss, findings are compatible with osseous   metastatic disease involvement with superimposed pathologic fracture.   There is extraosseous tumor at the T8 level within the anterior and   lateral epidural space resulting in severe spinal canal stenosis and   compression of the thoracic cord. There is subtle increased cord   signal. The epidural tumor extends along the posterior T7 vertebral   body with mild-to-moderate spinal canal stenosis. There is   extraosseous tumor within the neural foramen at T7-T8 and T8-T9 with   mild neural foraminal narrowing.   2. There is diffusely heterogeneous signal throughout the vertebral   bodies of the thoracic spine. There are multiple indeterminate   subcentimeter T1 hypointense foci within the T6, T7, T10 and T11   vertebral  bodies. Consider short-term interval follow-up for further   evaluation.   3. Numerous left suprahilar, right renal, and adrenal gland lesions   are partially visualized, better evaluated on recent CT abdomen   pelvis dated 07/31/2024.        Lumbar spine:             1. Diffusely heterogeneous bone marrow signal without a definite   focal lesion.   2. Degenerative changes without significant spinal canal stenosis. No   compression of the distal cord or nerve roots.   3. No abnormal signal or enhancement within the distal cord or nerve   roots.                  I personally reviewed the images/study, and I agree with the findings   as stated above. This study was interpreted at [place], [place].        MACRO:   None        Signed by: Violeta Velazquez 8/1/2024 1:23 PM   Dictation workstation:   [id number]      MR lumbar spine w and wo IV contrast   Final Result   MRI BRAIN:   1. No evidence of acute infarct, intracranial mass effect or midline   shift.   2. No evidence of intracranial metastatic disease within the   limitation of patient motion artifact.   3. There is a T2 hyperintense peripherally enhancing nodule within   the left parotid gland measuring 1 cm which may represent a cystic or   necrotic lymph node versus a primary parotid lesion.             MRI CERVICAL, THORACIC, & LUMBAR SPINE:        Cervical spine:        1. Heterogeneous bone marrow signal throughout the cervical spine   without definite evidence of a focal bone marrow replacing lesion.   2.   No abnormal signal or enhancement within the cervical cord.   3.   Mild degenerative changes without significant spinal canal   stenosis. Neural foraminal narrowing as detailed above. No cervical   cord compression.   4.   Borderline level 2 lymph nodes are nonspecific.             Thoracic spine:   1. Near-complete marrow replacement of the T8 vertebral body with   avid enhancement and acute compression  deformity with approximately   30% vertebral body height loss, findings are compatible with osseous   metastatic disease involvement with superimposed pathologic fracture.   There is extraosseous tumor at the T8 level within the anterior and   lateral epidural space resulting in severe spinal canal stenosis and   compression of the thoracic cord. There is subtle increased cord   signal. The epidural tumor extends along the posterior T7 vertebral   body with mild-to-moderate spinal canal stenosis. There is   extraosseous tumor within the neural foramen at T7-T8 and T8-T9 with   mild neural foraminal narrowing.   2. There is diffusely heterogeneous signal throughout the vertebral   bodies of the thoracic spine. There are multiple indeterminate   subcentimeter T1 hypointense foci within the T6, T7, T10 and T11   vertebral bodies. Consider short-term interval follow-up for further   evaluation.   3. Numerous left suprahilar, right renal, and adrenal gland lesions   are partially visualized, better evaluated on recent CT abdomen   pelvis dated 07/31/2024.        Lumbar spine:             1. Diffusely heterogeneous bone marrow signal without a definite   focal lesion.   2. Degenerative changes without significant spinal canal stenosis. No   compression of the distal cord or nerve roots.   3. No abnormal signal or enhancement within the distal cord or nerve   roots.                  I personally reviewed the images/study, and I agree with the findings   as stated above. This study was interpreted at [facility], [place], [place].        MACRO:   None        Signed by: Violeta Velazquez 8/1/2024 1:23 PM   Dictation workstation:   [id number]      MR brain w and wo IV contrast   Final Result   MRI BRAIN:   1. No evidence of acute infarct, intracranial mass effect or midline   shift.   2. No evidence of intracranial metastatic disease within the   limitation of patient motion artifact.   3. There  is a T2 hyperintense peripherally enhancing nodule within   the left parotid gland measuring 1 cm which may represent a cystic or   necrotic lymph node versus a primary parotid lesion.             MRI CERVICAL, THORACIC, & LUMBAR SPINE:        Cervical spine:        1. Heterogeneous bone marrow signal throughout the cervical spine   without definite evidence of a focal bone marrow replacing lesion.   2.   No abnormal signal or enhancement within the cervical cord.   3.   Mild degenerative changes without significant spinal canal   stenosis. Neural foraminal narrowing as detailed above. No cervical   cord compression.   4.   Borderline level 2 lymph nodes are nonspecific.             Thoracic spine:   1. Near-complete marrow replacement of the T8 vertebral body with   avid enhancement and acute compression deformity with approximately   30% vertebral body height loss, findings are compatible with osseous   metastatic disease involvement with superimposed pathologic fracture.   There is extraosseous tumor at the T8 level within the anterior and   lateral epidural space resulting in severe spinal canal stenosis and   compression of the thoracic cord. There is subtle increased cord   signal. The epidural tumor extends along the posterior T7 vertebral   body with mild-to-moderate spinal canal stenosis. There is   extraosseous tumor within the neural foramen at T7-T8 and T8-T9 with   mild neural foraminal narrowing.   2. There is diffusely heterogeneous signal throughout the vertebral   bodies of the thoracic spine. There are multiple indeterminate   subcentimeter T1 hypointense foci within the T6, T7, T10 and T11   vertebral bodies. Consider short-term interval follow-up for further   evaluation.   3. Numerous left suprahilar, right renal, and adrenal gland lesions   are partially visualized, better evaluated on recent CT abdomen   pelvis dated 07/31/2024.        Lumbar spine:             1. Diffusely heterogeneous bone  marrow signal without a definite   focal lesion.   2. Degenerative changes without significant spinal canal stenosis. No   compression of the distal cord or nerve roots.   3. No abnormal signal or enhancement within the distal cord or nerve   roots.                  I personally reviewed the images/study, and I agree with the findings   as stated above. This study was interpreted at [facility], [place], [place].        MACRO:   None        Signed by: Violeta Velazquez 8/1/2024 1:23 PM   Dictation workstation:   [id number]      MR cervical spine w and wo IV contrast   Final Result   MRI BRAIN:   1. No evidence of acute infarct, intracranial mass effect or midline   shift.   2. No evidence of intracranial metastatic disease within the   limitation of patient motion artifact.   3. There is a T2 hyperintense peripherally enhancing nodule within   the left parotid gland measuring 1 cm which may represent a cystic or   necrotic lymph node versus a primary parotid lesion.             MRI CERVICAL, THORACIC, & LUMBAR SPINE:        Cervical spine:        1. Heterogeneous bone marrow signal throughout the cervical spine   without definite evidence of a focal bone marrow replacing lesion.   2.   No abnormal signal or enhancement within the cervical cord.   3.   Mild degenerative changes without significant spinal canal   stenosis. Neural foraminal narrowing as detailed above. No cervical   cord compression.   4.   Borderline level 2 lymph nodes are nonspecific.             Thoracic spine:   1. Near-complete marrow replacement of the T8 vertebral body with   avid enhancement and acute compression deformity with approximately   30% vertebral body height loss, findings are compatible with osseous   metastatic disease involvement with superimposed pathologic fracture.   There is extraosseous tumor at the T8 level within the anterior and   lateral epidural space resulting in severe spinal canal  stenosis and   compression of the thoracic cord. There is subtle increased cord   signal. The epidural tumor extends along the posterior T7 vertebral   body with mild-to-moderate spinal canal stenosis. There is   extraosseous tumor within the neural foramen at T7-T8 and T8-T9 with   mild neural foraminal narrowing.   2. There is diffusely heterogeneous signal throughout the vertebral   bodies of the thoracic spine. There are multiple indeterminate   subcentimeter T1 hypointense foci within the T6, T7, T10 and T11   vertebral bodies. Consider short-term interval follow-up for further   evaluation.   3. Numerous left suprahilar, right renal, and adrenal gland lesions   are partially visualized, better evaluated on recent CT abdomen   pelvis dated 07/31/2024.        Lumbar spine:             1. Diffusely heterogeneous bone marrow signal without a definite   focal lesion.   2. Degenerative changes without significant spinal canal stenosis. No   compression of the distal cord or nerve roots.   3. No abnormal signal or enhancement within the distal cord or nerve   roots.                  I personally reviewed the images/study, and I agree with the findings   as stated above. This study was interpreted at [place], [place].        MACRO:   None        Signed by: Violeta Velazquez 8/1/2024 1:23 PM   Dictation workstation:   [id number]      CT angio chest for pulmonary embolism   Final Result   1. No evidence of acute pulmonary embolism.   2. Left upper lobe suprahilar mass with invasion into the mediastinum   and question of upper left pericardium involvement. Encasement of the   left upper lobe pulmonary arterial branches.   3. Enlarged subcarinal lymph node.   4. Small left-sided pleural effusion.   5. 0.6 cm left upper lobe perifissural nodule and 0.6 cm right lower   lobe pulmonary nodule. These are nonspecific.   6. Lytic destructive bone marrow lesion within the T8 vertebral  body.   Ventral epidural tumor at the level of T7-T8 better visualized on the   concurrent CT of abdomen/pelvis.   7. Nonspecific subcutaneous 0.8 cm nodule in the left upper anterior   chest wall.   8. Findings in the abdomen/pelvis report separately.        Overall findings are compatible with malignancy with lung tumor   primary considered most likely.        MACRO:   Gaetano Rafael Mckeonchie discussed the significance and urgency of this   critical finding by telephone with  NENA VILLEGAS on 7/31/2024 at   9:13 pm.  (**-RCF-**) Findings:  See findings.        Signed by: Miguel Streeter 7/31/2024 10:20 PM   Dictation workstation:   [id number]      CT abdomen pelvis w IV contrast   Final Result   1. Bilateral adrenal soft tissue masses with central necrosis   concerning for metastatic lesions. The large adrenal mass exerts some   mass effect on the adjacent kidney. There is tumor thrombus extending   from the right adrenal into the intrahepatic IVC with some superior   extension to the level of hepatic IVC.   2. Several soft tissue peritoneal deposits involving the pararenal   space and retroperitoneum as detailed above which are also suspicious   for metastatic disease.   3. Enlarged portacaval lymph nodes concerning for metastatic disease   involvement.   4. Lytic destructive lesion with mild pathologic compression   deformity of the T8 vertebral body without retropulsion. There is   ventral epidural tumor involvement at T7 and T8 with at least   moderate central canal stenosis at that level. Well-circumscribed   bone marrow lesion of the right iliac bone, which is nonspecific and   may be benign or malignant.   5. Several subcutaneous nodules are also suspicious for metastatic   disease.        Overall findings are suspicious for primary lung malignancy, with   other rare metastatic malignancy is considered less likely but not   excluded.        I personally reviewed the images/study and I agree with the  findings   as stated by resident Rafael Richards. This study was interpreted   at [facility], [place], [place].        MACRO:   Rafael Richards discussed the significance and urgency of this   critical finding by telephone with  NENA VILLEGAS on 7/31/2024 at   9:30 pm.  (**-RCF-**) Findings:  See findings.        Signed by: Miguel Streeter 7/31/2024 10:35 PM   Dictation workstation:   [id number]      Lower extremity venous duplex bilateral    (Results Pending)         Encounter Date: 07/31/24   ECG 12 lead   Result Value    Ventricular Rate 105    Atrial Rate 105    AR Interval 100    QRS Duration 78    QT Interval 380    QTC Calculation(Bazett) 502    R Axis 8    T Axis 92    QRS Count 18    Q Onset 221    P Onset 171    P Offset 197    T Offset 411    QTC Fredericia 457    Narrative    Sinus tachycardia with short AR  Nonspecific ST and T wave abnormality  Abnormal ECG  No previous ECGs available    See ED provider note for full interpretation and clinical correlation  Confirmed by Karen Velazquez ([id number]) on 7/31/2024 8:52:20 PM     Wt Readings from Last 5 Encounters:   07/31/24 74.4 kg (164 lb)       Current Outpatient Medications   Medication Instructions    atorvastatin (LIPITOR) 40 mg, oral, Every evening    citalopram (CELEXA) 20 mg, oral, Every evening    hydrOXYzine HCL (ATARAX) 25 mg, oral, Every morning    lisinopriL-hydrochlorothiazide 10-12.5 mg tablet 1 tablet, oral, Every morning    potassium chloride CR 10 mEq ER tablet 10 mEq, oral, Every morning     Scheduled medications   acetaminophen, 975 mg, oral, q8h  atorvastatin, 40 mg, oral, Daily  citalopram, 20 mg, oral, Daily  hydrocortisone, 10 mg, oral, q AM   And  hydrocortisone, 5 mg, oral, Daily  hydrocortisone, 5 mg, oral, Daily  hydrOXYzine HCL, 25 mg, oral, Daily  oxyCODONE ER, 10 mg, oral, q12h PATRICE  polyethylene glycol, 17 g, oral, Daily  sennosides-docusate sodium, 1 tablet, oral, Nightly      Continuous  medications  heparin, 0-4,500 Units/hr, Last Rate: 1,700 Units/hr (24 4857)      PRN medications  heparin, 3,000-6,000 Units, q4h PRN  HYDROmorphone, 0.5 mg, q4h PRN  oxyCODONE, 5 mg, q8h PRN         Allergies: No Known Allergies             PHYSICAL EXAMINATION:  Vital Signs:   Vital signs reviewed  Vitals:    24 0802   BP: 107/61   Pulse: 82   Resp: 20   Temp: 36.5 °C (97.7 °F)   SpO2: 98%     Pain Score: 10 - Worst possible pain     Physical Exam  Vitals reviewed.   Constitutional:       General: She is not in acute distress.  HENT:      Head: Normocephalic and atraumatic.      Mouth/Throat:      Mouth: Mucous membranes are moist.   Eyes:      Extraocular Movements: Extraocular movements intact.      Conjunctiva/sclera: Conjunctivae normal.      Pupils: Pupils are equal, round, and reactive to light.   Cardiovascular:      Rate and Rhythm: Normal rate and regular rhythm.      Heart sounds: Normal heart sounds.   Pulmonary:      Effort: Pulmonary effort is normal. No respiratory distress.   Abdominal:      General: Bowel sounds are normal. There is no distension.      Palpations: Abdomen is soft.   Musculoskeletal:         General: Normal range of motion.   Skin:     General: Skin is warm and dry.   Neurological:      General: No focal deficit present.      Mental Status: She is alert and oriented to person, place, and time.   Psychiatric:         Mood and Affect: Mood normal.         Behavior: Behavior normal.       ASSESSMENT/PLAN:  58 y.o. female with h/o depression, anxiety admitted with IVC and right renal vein thrombus. Work up showed significant metastatic disease in adrenal glands, spine, and left upper lobe lung, unclear origin, suspicion for primary lung cancer  s/p LN biopsy . Pt's  recently  of cancer and he underwent chem. Per team pts may prefer to avoid chemo and mentioning hospice. Neurosurg plans OR this week for T8 compression fracture,T6-T10 fusion for  Supportive onc  consulted for intro to services, and goc and pain management.   DNR/DNI  CT PE L perihilar mass encasing L pulmonary artery, CT AP b/l adrenal masses, R adrenal mass w/ extension into infrahepatic IVC, T8 compression fracture w 50% height loss, ventral epidural lesion, R iliac osteolytic lesion     Neoplasm related back pain due to spinal mets and T8 compression fracture.  Somatic.  Uncontrolled  OARRS/PDMP reviewed no aberrant behavior noted.consistent with  prescriptions/records and patient history   Pain is: cancer related pain  Type: somatic  Pain control: sub-optimally controlled  Home regimen:   Ibuprofen 200 mg 3 tablets/day  Intolerances/previously tried: None  Personalized pain goal: 4  Total OME usage for the past 24 hours: 50 mg ome    Recommend naproxen 500 mg p.o. twice daily  Recommend PPI for GI protection  Continue Tylenol 975 mg p.o. every 8 hours scheduled  Continue OxyContin 10 mg p.o. twice daily  Continue Dilaudid 0.5 mg IV every 4 hours as needed.  Used 1 dose/24 hours  Recommend changing oxycodone IR from 5 mg every 8 hours as needed to every 4 hours as needed  Continue to monitor pain scores and administer PRN medications as appropriate  Continue/initiate nonpharmacologic pain management strategies including ice/heat therapy, distraction techniques, deep breathing/relaxation techniques, calming music, and repositioning  Continue to monitor for signs of opioid efficacy (pain scores, improved functionality) and toxicity (pinpoint pupils, excess sedation/drowsiness/confusion, respiratory depression, etc.)    At risk for nausea without vomiting related to opioids Well-controlled  Home regimen:  none    Monitor     At risk for constipation related to opioids, currently constipated  Usual bowel pattern: every other day  Home regimen: none  LBM couple days ago  Recommend increasing MiraLAX from once a day to twice a day   recommend increasing Doc senna to 2 tabs p.o. twice daily  Warm water  Prune  juice  Encourage mobility as tolerated, PT/OT following   Monitor BM frequency, adjust regimen as needed  Goal to have BM without straining q48-72h    Altered Mood:  Chronic  anxiety and depression controlled with home regimen   Home regimen:  Celexa 20 mg p.o. daily, Atarax 25 mg p.o. daily  Continue Celexa 20 mg p.o. daily  Continue Atarax 25 mg p.o. daily      Supportive Interventions: Interventions: Music Therapy: referral placed, Art Therapy: referral placed, S[facility] Spiritual Care: referral placed    Disposition:  Please  start the process of having prior authorization with meds to beds deliver medications to patient prior to discharge via [facility] pharmacy. Prescriptions will need to be sent 48-72 hours prior to discharge so that a prior authorization can be completed.     Discharge date: unknown pending acute issues and pain control  Will assess if patient needs an appointment with Outpatient Supportive Oncology as appropriate    SIGNATURE AND BILLING:    High Complexity   Today, I am treating the patient for acute on chronic pain which is in severe exacerbation    Extensive DATA   Reviewed records from the following unique sources:  providers from oncology and primary services .  Diagnostic tests and information reviewed for today's visit:  Most recent labs and imaging results, Medications  Independently interpreted test CBC, CMP, MRI brain, MRI spine which shows leukocytosis, anemia, thrombocytosis, parotid nodule which could be cystic or necrotic versus primary parotid lesion, DJD, acute compression deformity T8 osseous mets pathologic fracture, extraosseous tumor at T8 resulting in severe spinal canal stenosis and compression of thoracic cord, extraosseous tumor at T7, T8, T9, diffuse spinal mets, and chemo  Discussed plan of Care/management of pain with: Provider via shared electronic medical record/secure chat/email or face-to-face.     High risk of morbidity from additional diagnostic testing or  treatment  The patient is on parenteral controlled substances: Dilaudid IV as needed as above    Medical Decision Making/Goals of Care/Advance Care Planning:  Introduction to Supportive and Palliative Oncology:  Introduced the role and philosophy of Supportive and Palliative oncology in the evaluation and management of symptoms during cancer treatment  Palliative care was introduced as a service for patients with serious illness to help with symptoms, assist with goals of care conversations, navigate complex decision making, improve quality of life for patients, and provide support both patients and families.  Patient seemed to appreciate the extra layer of support.     The patient and/or family consented to a voluntary Advance Care Planning conversation. Individuals present for the conversation: Patient    Summary of the conversation: Patient's current clinical condition, including diagnosis, prognosis, and management plan, and goals of care were discussed.     Life limiting disease: metastatic malignancy  Family: .    in .  Has 2 adult daughters and 2 adult sons.  Lives with son and his family.  1 daughter lives out of town and other 2 children live close by an  Performance status independent with ADLs and needs assistance with IADLs   Joys/meaning/strength: Tejinder understands that she has mets in the spine  Understanding of health:  understands that she has metastatic disease in the lungs and spine and chemotherapy may not be helpful.  She understands that surgery is high risk and she will need time to recover.  She understands chemotherapy is not curable.  She would like to know the results of the biopsy and if the cancer treatment then she will consider chemotherapy however if it is not treatable with chemotherapy then she would like to enroll in hospice  Information:Wants full disclosure  Goals: symptom control she would also like to know the results of the biopsy to help  further medical decision making regarding treatment  Worries and fears now and future: none   Code status discussion:  We specifically discussed code status.  I explained that I have this conversation with all my patients so that their wishes can be respected in case of emergency and also to ease the burden on caregivers to make those decisions in the emergency.I explained that since she is in the hospital, it is important to discuss wishes for care during times when she is unable to tell us, so that we can provide the care that she she would want nerissa in the circumstance that she were to become very sick and her heart were to stop.  We discussed her wishes regarding intubation/CPR in case of cardiopulmonary arrest. We discussed risks of CPR.  She stated that she wanted to be full code for now but doesn't want to be in a vegetative state with brain not functioning, not being aware of surroundings and not being able to communicate. This wouldn't be quality of life and would only cause suffering.  However she wants to discuss further with her family and get back to the primary team    Advance Directives  Existence of Advance Directives:No - has interest  Decision maker: Surrogate decision maker is oldest son with whom she lives  Code Status: Full code    Outcome of the conversation and documents completed: Consult  for advanced directive paperwork.  Patient will talk to family regarding CODE STATUS      Thank you for asking Supportive and Palliative Oncology to assist with care of this patient. Recommendations will be communicated back to the consulting service by way of shared electronic medical record/secure chat/email or face-to-face.  We will continue to follow.  Please contact us for additional questions or concerns.    Medical complexity was high level due to due to complexity of problems, extensive data review, and high risk of management/treatment.     I spent 60 minutes providing separately  identifiable ACP services/goals of care discussion with the patient and/or surrogate decision maker in a voluntary, in-person conversation discussing the patient's wishes and goals (discussing pt's beliefs, values and goals/wishes for aggressive medical care, desire for hospice vs palliative care), counseling including explainaition as detailed in the above note.    SIGNATURE: Halina Pham MD   PAGER/CONTACT:  Contact information:  Supportive and Palliative Oncology  Monday-Friday 8 AM-5 PM  Epic Secure chat or pager [phone].  After hours and weekends:  pager [phone]

## 2024-08-05 NOTE — PROGRESS NOTES
"Kassandra Veras is a 58 y.o. female on day 5 of admission presenting with Mass of upper lobe of left lung.    Subjective   Patient is seen and examined at bedside.   Endorses back pain.  Blood pressure at 112/63.   Reports overall decrease in appetite.     Objective   Review of Systems  Physical Exam  Vitals:    08/05/24 1345   BP:    Pulse:    Resp:    Temp: 36.4 °C (97.5 °F)   SpO2:     Constitutional:       General: She is awake. She is not in acute distress.     Appearance: Normal appearance.   HEENT:     Extraocular Movements: Extraocular movements intact, No palpable thyroid nodules.   Cardiovascular:      Rate and Rhythm: Normal rate and regular rhythm.      Heart sounds: Normal heart sounds. No murmur heard.  Pulmonary:      Effort: Pulmonary effort is normal. No respiratory distress.      Breath sounds: Normal breath sounds.   Abdominal:      General: Abdomen is flat. Bowel sounds are normal.      Palpations: Abdomen is soft.      Tenderness: There is no abdominal tenderness.   Skin:     General: Skin is warm and dry.   Neurological:      Mental Status: She is alert. Mental status is at baseline.  No delay in relaxation phase.   Psychiatric:         Behavior: Behavior is cooperative.   Last Recorded Vitals  Blood pressure 112/63, pulse 80, temperature 36.4 °C (97.5 °F), resp. rate 16, height 1.676 m (5' 6\"), weight 74.4 kg (164 lb), SpO2 95%.  Intake/Output last 3 Shifts:  No intake/output data recorded.    Relevant Results  Results from last 7 days   Lab Units 08/05/24  0818 08/04/24  0726 08/03/24  1054 08/02/24  0902 08/01/24  1836   GLUCOSE mg/dL 101* 90 108* 94 94        Assessment/Plan   Principal Problem:    Mass of upper lobe of left lung  Active Problems:    Hypokalemia    HTN (hypertension)    Malignancy (Multi)  Kassandra Veras is a 58 y.o. female with PMHx of HLD, HTN, depression, anxiety  ED for work up after U/S findings of IVC and right renal vein thrombus- also in the light of significant " metastatic disease in adrenal glands, spine -pathological T8 vb fx with lytic findings, epidural involvement, and left upper lobe lung, unclear origin, with  high suspicion for primary lung cancer s/p bronch for LN biopsy for 8/2. Endocrine is consulted on 8/4/24  in the lights of b/l adrenal findings and hypotension for preop optimization - s. Fusion.      Patient reports that her BP generally ran high - on lisinopril and amlodipine. However, in the past 2 months - has been low. She does endorse   Reports loss of balance.   No falls.   Endorses weight loss of 30 pounds for the past 2.5 months.   Endorsing back pain.   No hyperpigmentation.   No Hx of stroke or TIA.   No TCAs or  inhalers.   No Headaches, palpitations, and paresthesias.   Endorses hx of heavy nicotine use disorder.      Hospital course: No prednisone, no Hydrocortisone, no Methylprednisone.        Cortisol on 7/31 - 28 at 11;50 PM  Repeat Cortisol on 8/4 - 8 at 11 AM  No previous inpatient pred/solumedrol/hydrocortisone.         CT abdomen:   1. Bilateral adrenal soft tissue masses with central necrosis  concerning for metastatic lesions. The large adrenal mass exerts some  mass effect on the adjacent kidney. There is tumor thrombus extending  from the right adrenal into the intrahepatic IVC with some superior  extension to the level of hepatic IVC.  2. Several soft tissue peritoneal deposits involving the pararenal  space and retroperitoneum as detailed above which are also suspicious  for metastatic disease.  3. Enlarged portacaval lymph nodes concerning for metastatic disease  involvement.  4. Lytic destructive lesion with mild pathologic compression  deformity of the T8 vertebral body without retropulsion. There is  ventral epidural tumor involvement at T7 and T8 with at least  moderate central canal stenosis at that level. Well-circumscribed  bone marrow lesion of the right iliac bone, which is nonspecific and  may be benign or malignant.  5.  Several subcutaneous nodules are also suspicious for metastatic  disease.    Endocrine recommendations -8/5/24:   Low suspicion of AI in the light of random cortisol of 28 - likely appropriate stress response.   No electrolyte disturbances and acidosis.   Soft BP explained in the setting of her co-morbidities and recent course.   To confirm results:  Please Discontinue Hydrocortisone for a reliable Stim test - 12 hours after the last dose of Hydrocortisone.   Tomorrow 5 AM:  Please perform a Stim Test:  Please draw cortisol and ACTH as baseline once again. Then, administer 250 mcg of synthetic ACTH - please draw cortisol and ACTH after 30 minutes also after 60 minutes (30 minutes and 60 minutes after the ACTH administration).   For  a stimulated cortisol>15 - an appropriate adrenal response would rule out Adrenal Insufficiency.  Tentative op. Date - 8/7/24.   Endocrine will follow.   Plan communicated to primary team.   Patient is D/W Dr. Brandt.  Sameer Roque MD

## 2024-08-05 NOTE — PROGRESS NOTES
Transitional Care Coordination Progress Note:  Patient discussed during interdisciplinary rounds.  Team members present: MD, PADMAJA  Plan per Medical/Surgical team: Pt admitted with mass of upper lobe left lung, s/p bronchoscopy for LN biopsy 8/2, team plan to consult Supportive Oncology team for recs and possible goals of care discussion with pt/ family. Neurology team consulted for possible OR 8/7 for transpedic decompression, t6-10 fusion pending Oncology work up.  Payer: [employer]  Status: Inpatient  Discharge disposition: Home  Potential Barriers: none  ADOD: 8/7  Care coordinator will continue to follow for discharge planning needs.     Ellie Us RN  Transitional Care Coordinator/TCC  [id number]

## 2024-08-05 NOTE — SIGNIFICANT EVENT
BHARGAV   08/05/24 1221   Onset Documentation   Rapid Response Initiated By Radar auto page   Pager Time [phone]   Arrival Time 1249   Event End Time 1300   Primary Reason for Call Radar auto page     RADAR page auto generated from VS -see documented VS. Radar score 6. Message to the bedside nurse to request a recheck of temp as recorded as 34.7. To follow up with pt. Pt ok to remain on the floor for continued monitoring-bedside nurse  will call with any concerns.     Update- follow up at the bedside and no complaints. Temp re check @ 36.4

## 2024-08-05 NOTE — PROGRESS NOTES
Nirav Veras is a 58 y.o. female on day 5 of admission   Daughter and her family at bedside  Patient continues to be on heparin GTT  She reports coughing up blood-tinged mucus earlier today  Otherwise no bleeding  No new shortness of breath  She has the lower chest discomfort, not new according to the patient  No chest pain  No leg swelling or pain    Objective   Physical Exam  Vitals:    08/04/24 1637 08/04/24 2045 08/05/24 0000 08/05/24 0802   BP: 92/65 100/67 101/70 107/61   Pulse: 80 78 85 82   Resp:  16 16 20   Temp: 36.1 °C (97 °F)   36.5 °C (97.7 °F)   TempSrc:       SpO2: 98% 98% 96% 98%   Weight:       Height:            General:  In no acute distress  Neuro: alert and oriented x3  CV:  RRR  Lungs: CTA bilaterally (limited anterior)  Abd:  Soft, benign, mild generalized tenderness  Psych:  Appropriate affect  Upper extremities: No edema. +2 radial   Lower extremities: Bilateral trace edema.  +2 DP  Skin:  No open ulcers    Medications   Scheduled medications  acetaminophen, 975 mg, oral, q8h  atorvastatin, 40 mg, oral, Daily  citalopram, 20 mg, oral, Daily  hydrocortisone, 10 mg, oral, q AM   And  hydrocortisone, 5 mg, oral, Daily  hydrocortisone, 5 mg, oral, Daily  hydrOXYzine HCL, 25 mg, oral, Daily  oxyCODONE ER, 10 mg, oral, q12h PATRICE  polyethylene glycol, 17 g, oral, Daily  sennosides-docusate sodium, 1 tablet, oral, Nightly      Continuous medications  heparin, 0-4,500 Units/hr, Last Rate: 1,700 Units/hr (08/04/24 2357)      PRN medications  PRN medications: heparin, HYDROmorphone, oxyCODONE     Lab Review   Recent Labs     08/05/24  0818 08/04/24  0726 08/03/24  1054 08/02/24  0902 08/01/24  1836 08/01/24  1125 08/01/24  0402 07/31/24  2312 07/31/24  1750    141 135* 135* 134* 135* 133* 131* 134*   K 3.8 3.4* 3.3* 3.3* 3.8 3.2* 2.9* 3.3* 2.7*    102 98 100 99 101 97* 97* 94*   CO2 31 32 29 24 25 25 25 22 26   ANIONGAP 11 10 11 14 14 12 14 15 17   BUN 8 9 14 15 17 16 19 18 18  "  CREATININE 0.70 0.77 0.71 0.86 1.12* 1.16* 1.39* 1.29* 1.49*   EGFR >90 88 >90 77 56* 54* 44* 48* 40*   MG 1.97 2.14  --  2.17  --   --  2.17  --  2.25     Recent Labs     08/05/24  0818 08/04/24  0726 08/03/24  1054 08/02/24  0902 08/01/24  1836 08/01/24  0402 07/31/24  2312 07/31/24  1750   ALBUMIN 2.5* 2.5* 2.7* 2.4* 2.5*   < > 2.6* 2.8*   ALKPHOS  --   --   --   --   --   --  154* 188*   ALT  --   --   --   --   --   --  11 16   AST  --   --   --   --   --   --  14 25   BILITOT  --   --   --   --   --   --  0.6 0.8   LIPASE  --   --   --   --   --   --   --  28    < > = values in this interval not displayed.     Recent Labs     08/05/24 0818 08/04/24  0726 08/03/24  1054 08/02/24  0902 08/01/24  1125 08/01/24  0402 07/31/24  1750   WBC 14.5* 12.2* 17.7* 10.7 13.0* 12.8* 15.2*   HGB 8.7* 8.4* 8.0* 7.9* 9.1* 8.2* 10.3*   HCT 26.9* 27.4* 25.3* 24.4* 29.2* 25.3* 31.9*   * 641* 644* 579* 566* 537* 601*   MCV 84 87 84 82 87 83 81     Recent Labs     08/05/24 0818 08/04/24  0726 08/03/24  1054 08/03/24  0223 08/02/24  1853 08/01/24  1836 08/01/24  1133 08/01/24  0611 08/01/24  0402 08/01/24  0157 07/31/24  1750   INR  --   --   --   --   --   --   --   --  1.6*  --  1.5*   HAUF 0.3 0.3 0.3 0.4 0.2 0.3 0.3 0.2  --    < >  --     < > = values in this interval not displayed.     PTT - 8/4/2024:  7:26 AM  1.6   18.1 51     Estimated Creatinine Clearance: 88.1 mL/min (by C-G formula based on SCr of 0.7 mg/dL).    No results for input(s): \"CHOL\", \"LDLF\", \"HDL\", \"TRIG\" in the last 76342 hours.  No results found for: \"HGBA1C\"  No results found for: \"TSH\"    Imaging  ECHO 8/3/24   1. The left ventricular systolic function is normal, with a visually estimated ejection fraction of 60-65%.   2. There is normal right ventricular global systolic function.   3. RVSP within normal limits.   4. Normal aortic root.   5. There is a 2.8 x 4.2 cm obstructive echodensity in the IVC near the junction of the right atrium which may " represent a tumor or thrombus.    CTPE 7/31/24  1. No evidence of acute pulmonary embolism.  2. Left upper lobe suprahilar mass with invasion into the mediastinum  and question of upper left pericardium involvement. Encasement of the  left upper lobe pulmonary arterial branches.  3. Enlarged subcarinal lymph node.  4. Small left-sided pleural effusion.  5. 0.6 cm left upper lobe perifissural nodule and 0.6 cm right lower  lobe pulmonary nodule. These are nonspecific.  6. Lytic destructive bone marrow lesion within the T8 vertebral body.  Ventral epidural tumor at the level of T7-T8 better visualized on the  concurrent CT of abdomen/pelvis.  7. Nonspecific subcutaneous 0.8 cm nodule in the left upper anterior  chest wall.  8. Findings in the abdomen/pelvis report separately.    CT A/P 7/31/24   1. Bilateral adrenal soft tissue masses with central necrosis  concerning for metastatic lesions. The large adrenal mass exerts some  mass effect on the adjacent kidney. There is tumor thrombus extending  from the right adrenal into the intrahepatic IVC with some superior  extension to the level of hepatic IVC.  2. Several soft tissue peritoneal deposits involving the pararenal  space and retroperitoneum as detailed above which are also suspicious  for metastatic disease.  3. Enlarged portacaval lymph nodes concerning for metastatic disease  involvement.  4. Lytic destructive lesion with mild pathologic compression  deformity of the T8 vertebral body without retropulsion. There is  ventral epidural tumor involvement at T7 and T8 with at least  moderate central canal stenosis at that level. Well-circumscribed  bone marrow lesion of the right iliac bone, which is nonspecific and  may be benign or malignant.  5. Several subcutaneous nodules are also suspicious for metastatic  disease.    Assessment/Plan   Kassandra Veras is a 58 y.o. female with PMHx of HTN, HLD, smoking. Vascular medicine is following for thrombosis     _ IVC,  IVC-RA junction thrombosis, tumor and/or bland thrombus   In setting of imaging suggesting widespread metastatic disease with encasement of the  left upper lobe pulmonary arterial branches, of unclear primary yet   _ Anemia     Plan   --- Continue heparin gtt, monitor for bleeding   --- would recommend further thrombosis evaluation before surgery is done and anticoagulation is interrupted unless surgery is urgent/emergent   Recommend PET, if the IVC-RA thrombus has a bland component pt will be at very high risk of PE   We also need the LE DVT US to rule out the need for IVCF in case of surgery   --- Patient reports blood-tinged mucus today, otherwise no new complaints, please order CT PE    Lisset White MD

## 2024-08-05 NOTE — PROGRESS NOTES
"Kassandra Veras is a 58 y.o. female on day 5 of admission presenting with Mass of upper lobe of left lung.    Subjective   No events overnight, pt lying comfortably in bed     Objective     Physical Exam  AOx3  RUE D5 B5 T4+ HG/IO 4+  RLE HF4+ KE5 DF/PF 5  LUE D5 B5 T5 HG/IO 5  LLE HF4+ KE5 DF/PF 5  SILT    Last Recorded Vitals  Blood pressure 107/61, pulse 82, temperature 36.5 °C (97.7 °F), resp. rate 20, height 1.676 m (5' 6\"), weight 74.4 kg (164 lb), SpO2 98%.  Intake/Output last 3 Shifts:  No intake/output data recorded.    Relevant Results  Lab Results   Component Value Date    WBC 12.2 (H) 08/04/2024    HGB 8.4 (L) 08/04/2024    HCT 27.4 (L) 08/04/2024    MCV 87 08/04/2024     (H) 08/04/2024     Lab Results   Component Value Date    GLUCOSE 90 08/04/2024    CALCIUM 8.5 (L) 08/04/2024     08/04/2024    K 3.4 (L) 08/04/2024    CO2 32 08/04/2024     08/04/2024    BUN 9 08/04/2024    CREATININE 0.77 08/04/2024       Assessment/Plan   Principal Problem:    Mass of upper lobe of left lung  Active Problems:    Hypokalemia    HTN (hypertension)    Malignancy (Multi)    Kassandra is a 58 y.o. female with h/o HTN, HLD, depression, anxiety p/w hypotension and tachycardia, RUQ US IVC thrombus, R renal mass, CT PE L perihilar mass encasing L pulmonary artery, CT AP b/l adrenal masses, R adrenal mass w/ extension into infrahepatic IVC, T8 compression fracture w 50% height loss, ventral epidural lesion, R iliac osteolytic lesion    Plan:  Garcia primary   DNR/DNI  OR planning for Wed 8/7 for T8 transpedic decompression, T6-T10 fusion pending Onc workup  Vasc med recs  Vasc surg recs - no interventions  Urology recs    If palliative care is consulted Monday, please inform neurosurgery of time in order to be a part of the discussion  Will follow up EBUS final path and DVT US   Rest per primary      Maninder Greer MD      "

## 2024-08-05 NOTE — PROGRESS NOTES
"Kassandra Veras is a 58 y.o. female on hospital day 5 of admission presenting with Mass of upper lobe of left lung.    Subjective   Reports better pain control this AM with new scheduled meds started over the weekend. She is still interested in getting more info about treatment options before making a decision re:hospice/palliative/aggressive tx.          Objective     Physical Exam  Vitals reviewed.   Constitutional:       General: She is awake. She is not in acute distress.     Appearance: Normal appearance.   Eyes:      Extraocular Movements: Extraocular movements intact.   Cardiovascular:      Rate and Rhythm: Normal rate and regular rhythm.      Heart sounds: Normal heart sounds. No murmur heard.  Pulmonary:      Effort: Pulmonary effort is normal. No respiratory distress.      Breath sounds: Normal breath sounds.   Abdominal:      General: Abdomen is flat. Bowel sounds are normal.      Palpations: Abdomen is soft.      Tenderness: There is no abdominal tenderness.   Skin:     General: Skin is warm and dry.   Neurological:      Mental Status: She is alert. Mental status is at baseline.   Psychiatric:         Behavior: Behavior is cooperative.         Last Recorded Vitals  Blood pressure 101/70, pulse 85, temperature 36.1 °C (97 °F), resp. rate 16, height 1.676 m (5' 6\"), weight 74.4 kg (164 lb), SpO2 96%.  Intake/Output last 24h:  No intake or output data in the 24 hours ending 08/05/24 0653    Relevant Results  Results from last 72 hours   Lab Units 08/04/24  0726 08/03/24  1054 08/02/24  0902   WBC AUTO x10*3/uL 12.2* 17.7* 10.7   HEMOGLOBIN g/dL 8.4* 8.0* 7.9*   HEMATOCRIT % 27.4* 25.3* 24.4*   PLATELETS AUTO x10*3/uL 641* 644* 579*   SODIUM mmol/L 141 135* 135*   POTASSIUM mmol/L 3.4* 3.3* 3.3*   CHLORIDE mmol/L 102 98 100   CO2 mmol/L 32 29 24   BUN mg/dL 9 14 15   CREATININE mg/dL 0.77 0.71 0.86   GLUCOSE mg/dL 90 108* 94   CALCIUM mg/dL 8.5* 8.5* 8.2*   MAGNESIUM mg/dL 2.14  --  2.17   PHOSPHORUS mg/dL 3.6 " 3.0 4.2   ALBUMIN g/dL 2.5* 2.7* 2.4*     Scheduled medications  acetaminophen, 975 mg, oral, q8h  atorvastatin, 40 mg, oral, Daily  citalopram, 20 mg, oral, Daily  hydrocortisone, 10 mg, oral, q AM   And  hydrocortisone, 5 mg, oral, Daily  hydrocortisone, 5 mg, oral, Daily  hydrOXYzine HCL, 25 mg, oral, Daily  oxyCODONE ER, 10 mg, oral, q12h PATRICE  polyethylene glycol, 17 g, oral, Daily  sennosides-docusate sodium, 1 tablet, oral, Nightly      Continuous medications  heparin, 0-4,500 Units/hr, Last Rate: 1,700 Units/hr (08/04/24 3369)      PRN medications  PRN medications: heparin, HYDROmorphone, oxyCODONE         Assessment/Plan   Ms Veras is a 58 year old F, w/ PMHx of HLD, HTN, depression, anxiety, presents to  ED at the advice of her physician due to US findings of IVC and right renal vein thrombus. Imaging findings consistent with significant metastatic disease identified in adrenal glands, spine, and left upper lobe lung, unclear origin, however high suspicion for primary lung cancer.      #L. Perihilar Mass Encasing L. Pulmonary Artery  #B/L Adrenal Soft Tissues Masses   #Enlarged Portocaval Lymph Nodes   CTPE: Left upper lobe suprahilar mass with invasion into the mediastinum question of upper left pericardium involvement. Encasement of the left upper lobe pulmonary arterial branches. Enlarged subcarinal lymph node. 0.6 cm left upper lobe perifissural nodule and 0.6 cm right lower lobe pulmonary nodule.  -Etiology: lung primary cancer favored w/ metastasis to the adrenals.  Plan:  -s/p bronch for LN biopsy for 8/2  -Ongoing goals of care discussion with patient and her family - now DNR/DNI status  - Endocrinology consulted - recommending empiric treatment with hydrocortisone to treat possible adrenal insufficiency; also recommending stress dose preoperatively for neurosurgery  - Supportive oncology consulted - appreciate recommendations     #Right Iliac Osteolytic Lesions  #Ventral Epidural Lesion    #Pathologic T8 Compression Fracture   - CT Abd/pel w/ IV con: lytic destructive lesion with mild pathologic compression deformity of the T8 vertebral body without retropulsion. There is  ventral epidural tumor involvement at T7 and T8 with at least moderate central canal stenosis at that level.  Plan:  -Neurosurgery consulted - planning for OR next week; see risk stratification below  -Oxycontin 10mg q12 hrs + PRN dilaudid 0.5 + oxycodone 5mg q8 for pain - will plan for palliative care consult Monday as patient expressed wish to go to hospice after neurosurgery  -Neuro checks Q4H      #Pre-operative risk stratification  - Patient is class I risk level per RCRI scoring.   - In terms of medical optimization, patient will need steroid stress dosing due to possible adrenal insufficiency as documented by endocrinology.      #Hypokalemia   #Hyponatremia   -ANTONY on admission, now resolved; unclear baseline creatinine  Plan:  -Replete K as needed, continue to monitor closely   -Daily CMP     #Leukocytosis   -likely reactive, denies systemic signs of infection, afebrile and no evidence of infection seen on imaging.  -WBC 15.2 on admission, downtrending  Plan:  -Continue to monitor  -Daily CBC     #Anemia   #Thrombocythemia   #Intrahepatic IVC Thrombus   -No evidence of overt GI bleed. Presentation likely iso of malignancy. Patient with no history of vascular disease or history of blood clots  Plan:  -Vascular medicine consulted 8/1, appreciate recommendations              - LE duplex to evaluate for DVTs - pending  - Echo 8/3 with concern for IVC thrombus              - Continue heparin gtt for now   - Need to differentiate between bland tumor vs tumor + thrombus - ideally PET to evaluate     #HTN  #HLD   -Holding home HTN meds iso soft pressures since admission   -C/w Atorvastatin 40 mg once daily     #Depression   #Anxiety   -C/w home citalopram and hydroxyzine      F: PRN  E: PRN  N: Nregular  A: pIV  DVT: Heparin  gtt  GI: not indicated      Bowel Regimen: Miralax  Code Status: DNR/DNI  NOK: Adan Veras (son): [phone]       Patient seen and staffed with attending physician. Recommendations not final until attested.   Marc Montez MD

## 2024-08-06 ENCOUNTER — ANESTHESIA EVENT (OUTPATIENT)
Dept: OPERATING ROOM | Facility: HOSPITAL | Age: 59
End: 2024-08-06
Payer: COMMERCIAL

## 2024-08-06 ENCOUNTER — APPOINTMENT (OUTPATIENT)
Dept: VASCULAR MEDICINE | Facility: HOSPITAL | Age: 59
End: 2024-08-06
Payer: COMMERCIAL

## 2024-08-06 LAB
ACTH PLAS-MCNC: <1.5 PG/ML (ref 7.2–63.3)
ALBUMIN SERPL BCP-MCNC: 2.4 G/DL (ref 3.4–5)
ANION GAP SERPL CALC-SCNC: 13 MMOL/L (ref 10–20)
BUN SERPL-MCNC: 8 MG/DL (ref 6–23)
CALCIUM SERPL-MCNC: 8.3 MG/DL (ref 8.6–10.6)
CHLORIDE SERPL-SCNC: 101 MMOL/L (ref 98–107)
CO2 SERPL-SCNC: 29 MMOL/L (ref 21–32)
CORTIS AM PEAK SERPL-MSCNC: 16.9 UG/DL (ref 5–20)
CORTIS SERPL-MCNC: 17.3 UG/DL (ref 2.5–20)
CORTIS SERPL-MCNC: 28.3 UG/DL (ref 2.5–20)
CORTIS SERPL-MCNC: 29.8 UG/DL (ref 2.5–20)
CORTIS SERPL-MCNC: 29.8 UG/DL (ref 2.5–20)
CREAT SERPL-MCNC: 0.7 MG/DL (ref 0.5–1.05)
EGFRCR SERPLBLD CKD-EPI 2021: >90 ML/MIN/1.73M*2
ERYTHROCYTE [DISTWIDTH] IN BLOOD BY AUTOMATED COUNT: 15.3 % (ref 11.5–14.5)
GLUCOSE SERPL-MCNC: 91 MG/DL (ref 74–99)
HCT VFR BLD AUTO: 26.4 % (ref 36–46)
HGB BLD-MCNC: 8.1 G/DL (ref 12–16)
LAB AP ASR DISCLAIMER: NORMAL
LABORATORY COMMENT REPORT: NORMAL
LABORATORY COMMENT REPORT: NORMAL
MAGNESIUM SERPL-MCNC: 1.85 MG/DL (ref 1.6–2.4)
MCH RBC QN AUTO: 26.2 PG (ref 26–34)
MCHC RBC AUTO-ENTMCNC: 30.7 G/DL (ref 32–36)
MCV RBC AUTO: 85 FL (ref 80–100)
NRBC BLD-RTO: 0 /100 WBCS (ref 0–0)
PATH REPORT.COMMENTS IMP SPEC: NORMAL
PATH REPORT.FINAL DX SPEC: NORMAL
PATH REPORT.GROSS SPEC: NORMAL
PATH REPORT.INTRAOP OBS SPEC DOC: NORMAL
PATH REPORT.TOTAL CANCER: NORMAL
PHOSPHATE SERPL-MCNC: 3.6 MG/DL (ref 2.5–4.9)
PLATELET # BLD AUTO: 709 X10*3/UL (ref 150–450)
POTASSIUM SERPL-SCNC: 3.5 MMOL/L (ref 3.5–5.3)
RBC # BLD AUTO: 3.09 X10*6/UL (ref 4–5.2)
SODIUM SERPL-SCNC: 139 MMOL/L (ref 136–145)
UFH PPP CHRO-ACNC: 0.2 IU/ML
UFH PPP CHRO-ACNC: 0.4 IU/ML
UFH PPP CHRO-ACNC: 0.5 IU/ML
WBC # BLD AUTO: 15.8 X10*3/UL (ref 4.4–11.3)

## 2024-08-06 PROCEDURE — 80069 RENAL FUNCTION PANEL: CPT

## 2024-08-06 PROCEDURE — 86146 BETA-2 GLYCOPROTEIN ANTIBODY: CPT

## 2024-08-06 PROCEDURE — 2500000004 HC RX 250 GENERAL PHARMACY W/ HCPCS (ALT 636 FOR OP/ED)

## 2024-08-06 PROCEDURE — 85520 HEPARIN ASSAY: CPT

## 2024-08-06 PROCEDURE — 2500000001 HC RX 250 WO HCPCS SELF ADMINISTERED DRUGS (ALT 637 FOR MEDICARE OP): Performed by: INTERNAL MEDICINE

## 2024-08-06 PROCEDURE — 82533 TOTAL CORTISOL: CPT

## 2024-08-06 PROCEDURE — 82024 ASSAY OF ACTH: CPT

## 2024-08-06 PROCEDURE — 86147 CARDIOLIPIN ANTIBODY EA IG: CPT

## 2024-08-06 PROCEDURE — 99233 SBSQ HOSP IP/OBS HIGH 50: CPT | Performed by: INTERNAL MEDICINE

## 2024-08-06 PROCEDURE — 83735 ASSAY OF MAGNESIUM: CPT

## 2024-08-06 PROCEDURE — 80400 ACTH STIMULATION PANEL: CPT

## 2024-08-06 PROCEDURE — 36415 COLL VENOUS BLD VENIPUNCTURE: CPT

## 2024-08-06 PROCEDURE — 93970 EXTREMITY STUDY: CPT

## 2024-08-06 PROCEDURE — 99233 SBSQ HOSP IP/OBS HIGH 50: CPT

## 2024-08-06 PROCEDURE — 1200000002 HC GENERAL ROOM WITH TELEMETRY DAILY

## 2024-08-06 PROCEDURE — 99232 SBSQ HOSP IP/OBS MODERATE 35: CPT | Performed by: INTERNAL MEDICINE

## 2024-08-06 PROCEDURE — 2500000001 HC RX 250 WO HCPCS SELF ADMINISTERED DRUGS (ALT 637 FOR MEDICARE OP)

## 2024-08-06 PROCEDURE — 93970 EXTREMITY STUDY: CPT | Performed by: SURGERY

## 2024-08-06 PROCEDURE — 85027 COMPLETE CBC AUTOMATED: CPT

## 2024-08-06 RX ORDER — OXYCODONE HYDROCHLORIDE 5 MG/1
5 TABLET ORAL EVERY 4 HOURS PRN
Status: DISCONTINUED | OUTPATIENT
Start: 2024-08-06 | End: 2024-08-09

## 2024-08-06 RX ORDER — PANTOPRAZOLE SODIUM 40 MG/1
40 TABLET, DELAYED RELEASE ORAL
Status: DISCONTINUED | OUTPATIENT
Start: 2024-08-06 | End: 2024-08-09

## 2024-08-06 RX ORDER — AMOXICILLIN 250 MG
2 CAPSULE ORAL 2 TIMES DAILY
Status: DISCONTINUED | OUTPATIENT
Start: 2024-08-06 | End: 2024-08-09

## 2024-08-06 RX ORDER — NAPROXEN 500 MG/1
500 TABLET ORAL
Status: DISCONTINUED | OUTPATIENT
Start: 2024-08-06 | End: 2024-08-08

## 2024-08-06 RX ORDER — POLYETHYLENE GLYCOL 3350 17 G/17G
17 POWDER, FOR SOLUTION ORAL 2 TIMES DAILY
Status: DISCONTINUED | OUTPATIENT
Start: 2024-08-06 | End: 2024-08-09

## 2024-08-06 ASSESSMENT — COGNITIVE AND FUNCTIONAL STATUS - GENERAL
MOVING TO AND FROM BED TO CHAIR: A LITTLE
WALKING IN HOSPITAL ROOM: A LITTLE
MOBILITY SCORE: 20
STANDING UP FROM CHAIR USING ARMS: A LITTLE
DRESSING REGULAR LOWER BODY CLOTHING: A LITTLE
MOVING TO AND FROM BED TO CHAIR: A LITTLE
CLIMB 3 TO 5 STEPS WITH RAILING: A LITTLE
DAILY ACTIVITIY SCORE: 23
STANDING UP FROM CHAIR USING ARMS: A LITTLE
WALKING IN HOSPITAL ROOM: A LITTLE
MOBILITY SCORE: 20
DAILY ACTIVITIY SCORE: 23
CLIMB 3 TO 5 STEPS WITH RAILING: A LITTLE
DRESSING REGULAR LOWER BODY CLOTHING: A LITTLE

## 2024-08-06 ASSESSMENT — PAIN DESCRIPTION - LOCATION: LOCATION: BACK

## 2024-08-06 ASSESSMENT — PAIN SCALES - GENERAL
PAINLEVEL_OUTOF10: 9
PAINLEVEL_OUTOF10: 8
PAINLEVEL_OUTOF10: 3

## 2024-08-06 NOTE — SIGNIFICANT EVENT
Rapid Response RN Note    Rapid response RN for RADAR score 6 due to the recent VS listed below:     Vitals:    08/05/24 2038 08/06/24 0017 08/06/24 0457 08/06/24 0500   BP: 98/54 109/64 100/59    Pulse: 79 83 81    Resp: 18 18 18    Temp: 35.9 °C (96.6 °F) 36.9 °C (98.4 °F) 34.5 °C (94.1 °F) 36.2 °C (97.1 °F)   TempSrc:       SpO2: 96% 98% 94%    Weight:       Height:            Reviewed above VS with bedside RN via phone.  Temperature corrected.  VS within patient's current trends.  No interventions by rapid response team indicated at this time.  Staff to page rapid response for any concerns or acute change in condition/VS.

## 2024-08-06 NOTE — SIGNIFICANT EVENT
RAPID RESPONSE RN NOTE   08/06/24 1210   Onset Documentation   Rapid Response Initiated By Radar auto page   [place] [facility]  [place])   Pager Time 1207   Arrival Time 1210   Event End Time 1225   Level II Called No   Primary Reason for Call Radar auto page  (RADAR 7)     VS: 36, 82, 17, 90/57, 91%.  Reviewed VS with Abelardo RN - VS consistent with trend for keara, no acute concerns from Nursing at this time.  RN to contact Rapid Response Team with any further issues or patient deterioration.

## 2024-08-06 NOTE — PROGRESS NOTES
Spiritual Care Visit      introduced self and spiritual care services to patient, explaining services available and checking in to see how patient is doing today. Patient shared some of what was been going on in the last few days as we processed that experience.     Patient expressed her concern about chemotherapy as she saw her  go through it. She said she only would consider it if there would be a significant impact on the disease burden. She said she is at peace with not pursuing treatment and entering into hospice care.     Patient shared she is at peace with her death and has been able to share her plan with her family.     Patient lives with her son's family. She said her sister is going to move in to help take care of her.      -Patient's   in March from cancer. Patient shared she had a good experience with [facility] in [facility].   -Patient's brother also  in January.      Patient asked for the  to continue to stop by and to keep praying for her. Please reach out with any needs.     Rev. Dean Mei, Supportive Oncology

## 2024-08-06 NOTE — PROGRESS NOTES
Subjective   Kassandra Veras is a 58 y.o. female on day 6 of admission   No acute events overnight    Objective   Physical Exam  AOx3  RUE D5 B5 T4+ HG/IO 4+  RLE HF4+ KE5 DF/PF 5  LUE D5 B5 T5 HG/IO 5  LLE HF4+ KE5 DF/PF 5  SILT    Assessment/Plan   Kassandra is a 58 y.o. female with h/o HTN, HLD, depression, anxiety p/w hypotension and tachycardia, RUQ US IVC thrombus, R renal mass, CT PE L perihilar mass encasing L pulmonary artery, CT AP b/l adrenal masses, R adrenal mass w/ extension into infrahepatic IVC, T8 compression fracture w 50% height loss, ventral epidural lesion, R iliac osteolytic lesion     PLAN  DNR/DNI  Jose primary  Possible OR pending final pathology results  Supportive onc recs  Endo recs  Neurosurgery will continue to follow    Carole Lockwood MD

## 2024-08-06 NOTE — CARE PLAN
The patient's goals for the shift include      T  Problem: Pain  Goal: Takes deep breaths with improved pain control throughout the shift  Outcome: Progressing  Goal: Turns in bed with improved pain control throughout the shift  Outcome: Progressing  Goal: Walks with improved pain control throughout the shift  Outcome: Progressing     Problem: Respiratory  Goal: Clear secretions with interventions this shift  Outcome: Progressing  Goal: Minimize anxiety/maximize coping throughout shift  Outcome: Progressing  Goal: Tolerate pulmonary toileting this shift  Outcome: Progressing  Goal: Wean oxygen to maintain O2 saturation per order/standard this shift  Outcome: Progressing  Goal: Increase self care and/or family involvement in next 24 hours  Outcome: Progressing     Problem: Skin  Goal: Decreased wound size/increased tissue granulation at next dressing change  Outcome: Progressing  Goal: Participates in plan/prevention/treatment measures  Outcome: Progressing  Goal: Prevent/manage excess moisture  Outcome: Progressing  Goal: Prevent/minimize sheer/friction injuries  Outcome: Progressing  Goal: Promote/optimize nutrition  Outcome: Progressing  Goal: Promote skin healing  Outcome: Progressing      [facility]

## 2024-08-06 NOTE — CONSULTS
Name: Kassandra Veras  MRN: [id number]  Admit Date: 7/31/2024  Encounter Date: 8/7/2024  PCP: Agustin Yuan DO    Reason for consult: stage 4 NSCLC  Attending provider: Shannan Goodwin DO  Consult attending provider: Dr. Nino    Oncology Consult Note      History of Present Illness   Kassandra Veras is a 58 y.o. female with a notable history of depression, anxiety, HTN, HLD presented on 7/31 for abdominal and spinal pain and was found to have IVC and right venal vein thrombus on ultrasound in  [place] clinic, and inpatient workup suggests widely metastatic NSCLC based on cytology from [facility].    Patient started developing acute on chronic back pain (for several years) over the past two months prior to admission. Patient noted to have T8 compression fracture due to cancer. She has not been eating well due to the back pain, as well as depression from her  passing away from colorectal cancer in March. Imaging suggests widely metastatic cancer with a JESICA suprahilar mass invading into the mediastinum, subcarinal lymph node, involvement in bilateral adrenal glands, osseous lesions, and subcutaneous nodules.    Currently she feels well. She still has some abdominal pain and mid-back pain, which the pain medication is partially helping.      Onc History   July 2024 abdominal and back pain worsening  7/31/24 right upper pole renal mass, IVC thrombus  7/31/24 CT PE - no PE, left upper lobe suprahilar mass with invasion into mediastinum and encasement of JESICA pulm arterial branches, enlarged subcarinal LN  7/31/24 CT AP - bilateral adrenal masses suspicious for metastases, T8 lytic lesion with tumor involvement, several subcutaneous nodules  8/1/24 MRI brain - no mets  8/1/24 MRI spine - T8 acute compression deformity, multiple metastases in thoracic spine      Oncology History    No history exists.       Past Medical History     Past Medical History:   Diagnosis Date    Hyperlipidemia     Hypertension          Past Surgical  History   History reviewed. No pertinent surgical history.      Family History    Father  of lymphoma  Mother with cervical/ovarian cancer, s/p hysterectomy (still alive)    Social History   Was a  in [employer] in [place] before retiring in  and moving back to [place]  Currently lives with one of her sons and his family in [place]    Smokin/2 PPD from age [age]  Alcohol: quit 4 months ago  Recreational drugs: occasional marijuana    Social History     Socioeconomic History    Marital status:    Tobacco Use    Smoking status: Every Day     Types: Cigarettes    Smokeless tobacco: Never     Social Determinants of Health     Financial Resource Strain: Low Risk  (2024)    Overall Financial Resource Strain (CARDIA)     Difficulty of Paying Living Expenses: Not hard at all   Food Insecurity: Not on File (2019)    Received from [facility]    Food Insecurity     Food: 0   Transportation Needs: No Transportation Needs (2024)    PRAPARE - Transportation     Lack of Transportation (Medical): No     Lack of Transportation (Non-Medical): No   Physical Activity: Not on File (2019)    Received from [facility]    Physical Activity     Physical Activity: 0   Stress: Not on File (2019)    Received from [facility]    Stress     Stress: 0   Social Connections: Not on File (2019)    Received from [facility]    Social Connections     Social Connections and Isolation: 0   Housing Stability: Low Risk  (2024)    Housing Stability Vital Sign     Unable to Pay for Housing in the Last Year: No     Number of Times Moved in the Last Year: 0     Homeless in the Last Year: No         Allergies   No Known Allergies    Medications   acetaminophen, 975 mg, q8h  atorvastatin, 40 mg, Daily  citalopram, 20 mg, Daily  hydrOXYzine HCL, 25 mg, Daily  naproxen, 500 mg, BID  oxyCODONE ER, 10 mg, q12h PATRICE  pantoprazole, 40 mg, Daily before breakfast  polyethylene glycol, 17 g, BID  sennosides-docusate sodium, 2  "tablet, BID      heparin, Last Rate: 19 Units/hr (08/06/24 2208)      heparin, 3,000-6,000 Units, q4h PRN  HYDROmorphone, 0.5 mg, q4h PRN  oxyCODONE, 5 mg, q4h PRN        Review of Systems   12-point ROS negative, except as specified in the HPI.    Physical Exam   BP 94/56   Pulse 72   Temp 36 °C (96.8 °F)   Resp 18   Ht 1.676 m (5' 6\")   Wt 74.4 kg (164 lb)   SpO2 94%   BMI 26.47 kg/m²     General: awake, alert, ill-appearing, no acute distress  HEENT: normocephalic, atraumatic  Neck: no palpable lymphadenopathy  CV: normal rate, regular rhythm, no MRG  Resp: CTAB, no labored breathing  Abdominal: soft, non-tender, non-distended, active bowel sounds  Extremities: full ROM, no lower extremity swelling  Neuro: no focal neuro deficits  Skin: no rashes, erythema, or ecchymoses  Psych: normal affect and mood, appropriate judgment    Labs   Reviewed    Imaging   Reviewed    Assessment/Plan     Kassandra Veras is a 58 y.o. female with a notable history of depression, anxiety, HTN, HLD presented on 7/31 for abdominal and spinal pain and was found to have IVC and right venal vein thrombus on ultrasound in  [place] clinic, and inpatient workup suggests widely metastatic NSCLC (stage IVB M1c) based on cytology from [facility]. Patient is also noted to have T8 compression fracture due to cancer involvement of the spine.    Cytology report suggests the cancer is non-small cell, with final pathology report pending. Given the acute rapid presentation, this cancer appears to have aggressive features which is commonly a component of small cell lung cancer. The prothrombotic state is more common in lung adenocarcinoma.    We discussed different treatment options that are used to treat metastatic lung cancer for the patient. All questions were answered. Unclear what direction to go right now pending final pathology.    Oncology will follow up final pathology report, and decision will be made if systemic treatment inpatient is " necessary.    Recommendations:  Please ensure patient is adequately anticoagulated, and she will likely need to stay therapeutically anticoagulated for life.  Please consult rad/onc for palliative XRT evaluation of the T8 site given NSGY may not operate.  Please ensure patient's nutrition status is optimized in the setting of her cancer.  Patient prefers to establish outpatient oncology care in [place].    Thank you for this consult. Patient was seen, examined, and discussed with Dr. Nino.    Orion Montiel MD  Medical Oncology Fellow  8/7/2024

## 2024-08-06 NOTE — PROGRESS NOTES
"Kassandra Veras is a 58 y.o. female on hospital day 6 of admission presenting with Mass of upper lobe of left lung.    Subjective   NAEON. Reports pain control could be better - updated her on recommendations from Supportive Onc re: pain control. At this time, she's not ready to move forward with surgery until she has more information regarding her cancer prognosis.          Objective     Physical Exam  Vitals reviewed.   Constitutional:       General: She is awake. She is not in acute distress.     Appearance: Normal appearance.   Eyes:      Extraocular Movements: Extraocular movements intact.   Cardiovascular:      Rate and Rhythm: Normal rate and regular rhythm.      Heart sounds: Normal heart sounds. No murmur heard.  Pulmonary:      Effort: Pulmonary effort is normal. No respiratory distress.      Breath sounds: Normal breath sounds.   Abdominal:      General: Abdomen is flat. Bowel sounds are normal.      Palpations: Abdomen is soft.      Tenderness: There is no abdominal tenderness.   Skin:     General: Skin is warm and dry.   Neurological:      Mental Status: She is alert. Mental status is at baseline.   Psychiatric:         Behavior: Behavior is cooperative.         Last Recorded Vitals  Blood pressure 100/59, pulse 81, temperature 36.2 °C (97.1 °F), resp. rate 18, height 1.676 m (5' 6\"), weight 74.4 kg (164 lb), SpO2 94%.  Intake/Output last 24h:    Intake/Output Summary (Last 24 hours) at 8/6/2024 0753  Last data filed at 8/6/2024 0342  Gross per 24 hour   Intake 1892.73 ml   Output --   Net 1892.73 ml       Relevant Results  Results from last 72 hours   Lab Units 08/05/24  0818 08/04/24  0726 08/03/24  1054   WBC AUTO x10*3/uL 14.5* 12.2* 17.7*   HEMOGLOBIN g/dL 8.7* 8.4* 8.0*   HEMATOCRIT % 26.9* 27.4* 25.3*   PLATELETS AUTO x10*3/uL 718* 641* 644*   SODIUM mmol/L 142 141 135*   POTASSIUM mmol/L 3.8 3.4* 3.3*   CHLORIDE mmol/L 104 102 98   CO2 mmol/L 31 32 29   BUN mg/dL 8 9 14   CREATININE mg/dL 0.70 0.77 " 0.71   GLUCOSE mg/dL 101* 90 108*   CALCIUM mg/dL 8.6 8.5* 8.5*   MAGNESIUM mg/dL 1.97 2.14  --    PHOSPHORUS mg/dL 3.2 3.6 3.0   ALBUMIN g/dL 2.5* 2.5* 2.7*     Scheduled medications  acetaminophen, 975 mg, oral, q8h  atorvastatin, 40 mg, oral, Daily  citalopram, 20 mg, oral, Daily  cosyntropin, 250 mcg, intravenous, Once  hydrOXYzine HCL, 25 mg, oral, Daily  naproxen, 500 mg, oral, BID  oxyCODONE ER, 10 mg, oral, q12h PATRICE  pantoprazole, 40 mg, oral, Daily before breakfast  polyethylene glycol, 17 g, oral, BID  sennosides-docusate sodium, 2 tablet, oral, BID      Continuous medications  heparin, 0-4,500 Units/hr, Last Rate: 1,700 Units/hr (08/06/24 0342)      PRN medications  PRN medications: heparin, HYDROmorphone, oxyCODONE         Assessment/Plan   Ms Veras is a 58 year old F, w/ PMHx of HLD, HTN, depression, anxiety, presents to  ED at the advice of her physician due to US findings of IVC and right renal vein thrombus. Imaging findings consistent with significant metastatic disease identified in adrenal glands, spine, and left upper lobe lung, unclear origin, however high suspicion for primary lung cancer.      #Suspected stage IV Non-small cell lung carcinoma  CTPE: Left upper lobe suprahilar mass with invasion into the mediastinum question of upper left pericardium involvement. Encasement of the left upper lobe pulmonary arterial branches. Enlarged subcarinal lymph node. 0.6 cm left upper lobe perifissural nodule and 0.6 cm right lower lobe pulmonary nodule.  -Cytology consistent with non-small cell lung carcinoma  Plan:  -s/p bronch for LN biopsy for 8/2  - Ongoing goals of care discussion with patient and her family - now DNR/DNI status  - Endocrinology consulted - concern for possible AI in setting of mets to bilateral adrenals - AM stim test not supportive of adrenal insufficiency   - Supportive oncology consulted - appreciate recommendations  - Oncology consulted - appreciate recommendations      #Right Iliac Osteolytic Lesions  #Ventral Epidural Lesion   #Pathologic T8 Compression Fracture   - CT Abd/pel w/ IV con: lytic destructive lesion with mild pathologic compression deformity of the T8 vertebral body without retropulsion. There is  ventral epidural tumor involvement at T7 and T8 with at least moderate central canal stenosis at that level.  Plan:  -Neurosurgery consulted - concern for patient discharging without concrete treatment plan given risk of developing acute weakness and missed opportunity to preserve functional status; conversation regarding surgery is ongoing; they would also like to have her discussed at tumor board  -Oxycontin 10mg q12 hrs + Naproxen 500mg BID + PRN dilaudid 0.5 + oxycodone 5mg q4 for pain - see supportive onc pain recommendations     #Pre-operative risk stratification  - Patient is class I risk level per RCRI scoring.   - In terms of medical optimization, patient may need steroid stress dosing due to possible adrenal insufficiency - stim test is ongoing and endo is following    #Anemia   #Thrombocythemia   #Intrahepatic IVC Thrombus   -No evidence of overt GI bleed. Presentation likely iso of malignancy. Patient with no history of vascular disease or history of blood clots  Plan:  -Vascular medicine consulted 8/1, appreciate recommendations              - LE duplex to evaluate for DVTs negative  - Echo 8/3 with concern for IVC thrombus              - Continue heparin gtt for now   - Need to differentiate between bland tumor vs tumor + thrombus - ideally PET to evaluate  - Will likely need DOAC upon discharge     #HTN  #HLD   -Holding home HTN meds iso soft pressures since admission   -C/w Atorvastatin 40 mg once daily     #Depression   #Anxiety   -C/w home citalopram and hydroxyzine      F: PRN  E: PRN  N: regular  A: pIV  DVT: Heparin gtt  GI: Protonix     Bowel Regimen: Miralax + Doc-Senna  Code Status: DNR/DNI  NOK: Adan Veras (son): [phone]       Patient seen  and staffed with attending physician. Recommendations not final until attested.   Macr Montez MD

## 2024-08-06 NOTE — PROGRESS NOTES
SUPPORTIVE AND PALLIATIVE ONCOLOGY INPATIENT FOLLOW-UP      SERVICE DATE: 24     SUBJECTIVE:  Interval Events: Slept well last night.  Overall pain much better she is smiling    Pain Assessment:  Location:  Mid back  Duration: Constant  Characteristics:   Ratin   Descriptors: sharp   Aggravating: movement    Relieving: Analgesics positioning, resting   Interference with Function: A little    Opioid Use  Past 24 h prn opioid use:   Dilaudid 0.5 mg IV every 4 hours as needed.  Used 1 dose/24 hours= 0.5 mg IV Dilaudid/24 hours= 6.25 milligram OME  oxycodone IR 5 mg every 4 hours as needed.  Used 2 doses/24 hours= 10 mg oxycodone/24 hours= 12.5 mg OME  Total 24h OME use: 28 mg OME  Continue OxyContin 10 mg p.o. twice daily    Note: OME calculations based on equianalgesic table below. Please note this table is based on best available evidence but conversions are still approximate. These are NOT opioid DOSES for individual patient use; this is equivalency information.  Drug Parenteral Enteral   Morphine 10 25   Oxycodone N/A 20   Hydromorphone 2 5   Fentanyl 0.15 N/A   Tramadol N/A 120   Citation: Camila PANIAGUA. Demystifying opioid conversion calculations: A guide for effective dosing, Second edition. [place]: [employer]System Pharmacists, 2018.    Symptom Assessment:    Nausea none  Constipation somewhat feels like having a bowel movement today  Shortness of breath none  Lack of appetite none  Difficulty Sleeping none    Information obtained from: chart review, interview of patient, and discussion with primary team  ______________________________________________________________________        OBJECTIVE:  Results from last 7 days   Lab Units 24  0818 24  0726 24  1054 24  0902 24  1125   WBC AUTO x10*3/uL 14.5* 12.2* 17.7* 10.7 13.0*   HEMOGLOBIN g/dL 8.7* 8.4* 8.0* 7.9* 9.1*   HEMATOCRIT % 26.9* 27.4* 25.3* 24.4* 29.2*   PLATELETS AUTO x10*3/uL 718* 641* 644*  579* 566*   NEUTROS PCT AUTO %  --   --  80.9 74.4 75.6   LYMPHS PCT AUTO %  --   --  10.6 15.0 15.0   MONOS PCT AUTO %  --   --  7.0 7.5 6.2   EOS PCT AUTO %  --   --  0.0 1.5 1.5     Results from last 7 days   Lab Units 08/05/24  0818 08/04/24  0726 08/03/24  1054 08/01/24  0402 07/31/24  2312 07/31/24  1750   SODIUM mmol/L 142 141 135*   < > 131* 134*   POTASSIUM mmol/L 3.8 3.4* 3.3*   < > 3.3* 2.7*   CHLORIDE mmol/L 104 102 98   < > 97* 94*   CO2 mmol/L 31 32 29   < > 22 26   BUN mg/dL 8 9 14   < > 18 18   CREATININE mg/dL 0.70 0.77 0.71   < > 1.29* 1.49*   CALCIUM mg/dL 8.6 8.5* 8.5*   < > 8.0* 8.7   PROTEIN TOTAL g/dL  --   --   --   --  6.1* 7.3   BILIRUBIN TOTAL mg/dL  --   --   --   --  0.6 0.8   ALK PHOS U/L  --   --   --   --  154* 188*   ALT U/L  --   --   --   --  11 16   AST U/L  --   --   --   --  14 25   GLUCOSE mg/dL 101* 90 108*   < > 109* 121*    < > = values in this interval not displayed.     Estimated Creatinine Clearance: 88.1 mL/min (by C-G formula based on SCr of 0.7 mg/dL).     Scheduled medications  acetaminophen, 975 mg, oral, q8h  atorvastatin, 40 mg, oral, Daily  citalopram, 20 mg, oral, Daily  cosyntropin, 250 mcg, intravenous, Once  hydrOXYzine HCL, 25 mg, oral, Daily  naproxen, 500 mg, oral, BID  oxyCODONE ER, 10 mg, oral, q12h PATRICE  pantoprazole, 40 mg, oral, Daily before breakfast  polyethylene glycol, 17 g, oral, BID  sennosides-docusate sodium, 2 tablet, oral, BID      Continuous medications  heparin, 0-4,500 Units/hr, Last Rate: 1,700 Units/hr (08/06/24 0342)      PRN medications  heparin, 3,000-6,000 Units, q4h PRN  HYDROmorphone, 0.5 mg, q4h PRN  oxyCODONE, 5 mg, q4h PRN      }   PHYSICAL EXAMINATION:    Vital Signs:   Vital signs reviewed  Visit Vitals  /59   Pulse 81   Temp 36.2 °C (97.1 °F)   Resp 18      0-10 (Numeric) Pain Score: 9     Physical Exam  Vitals reviewed.   Constitutional:       General: She is not in acute distress.  HENT:      Head: Normocephalic and  atraumatic.      Mouth/Throat:      Mouth: Mucous membranes are moist.   Eyes:      Extraocular Movements: Extraocular movements intact.      Conjunctiva/sclera: Conjunctivae normal.      Pupils: Pupils are equal, round, and reactive to light.   Cardiovascular:      Rate and Rhythm: Normal rate and regular rhythm.      Heart sounds: Normal heart sounds.   Pulmonary:      Effort: Pulmonary effort is normal. No respiratory distress.   Abdominal:      General: Bowel sounds are normal. There is no distension.      Palpations: Abdomen is soft.   Musculoskeletal:         General: Normal range of motion.   Skin:     General: Skin is warm and dry.   Neurological:      General: No focal deficit present.      Mental Status: She is alert and oriented to person, place, and time.   Psychiatric:         Mood and Affect: Mood normal.         Behavior: Behavior normal.        ASSESSMENT/PLAN:  58 y.o. female with h/o depression, anxiety admitted with IVC and right renal vein thrombus. Work up showed significant metastatic disease in adrenal glands, spine, and left upper lobe lung, unclear origin, suspicion for primary lung cancer  s/p LN biopsy . Pt's  recently  of cancer and he underwent chem. Per team pts may prefer to avoid chemo and mentioning hospice. Neurosurg plans OR this week for T8 compression fracture,T6-T10 fusion for  Supportive onc consulted for intro to services, and goc and pain management.      Neoplasm related back pain due to spinal mets and T8 compression fracture.  Somatic.  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 every 8 hours scheduled  Continue naproxen 500 mg p.o. twice daily to be started today morning.  Continue PPI for GI protection to be started today morning  Continue OxyContin 10 mg p.o. twice daily  Continue Dilaudid 0.5 mg IV every 4 hours as needed.  Used 1 dose/24 hours  Continue oxycodone IR 5 mg every 4 hours as needed  Continue to monitor pain scores and administer PRN medications as appropriate  Continue/initiate nonpharmacologic pain management strategies including ice/heat therapy, distraction techniques, deep breathing/relaxation techniques, calming music, and repositioning  Continue to monitor for signs of opioid efficacy (pain scores, improved functionality) and toxicity (pinpoint pupils, excess sedation/drowsiness/confusion, respiratory depression, etc.)     At risk for nausea without vomiting related to opioids Well-controlled  Home regimen:  none   Monitor      At risk for constipation related to opioids, currently constipated  Usual bowel pattern: every other day  Home regimen: none  LBM couple days ago.  Feels like having a bowel movement today  Continue MiraLAX  twice a day.  Increase dose to be started today morning  Continue Doc senna 2 tabs p.o. twice daily.  Increased dose to be started today morning  Warm water  Prune juice  Encourage mobility as tolerated, PT/OT following   Monitor BM frequency, adjust regimen as needed  Goal to have BM without straining q48-72h     Altered  Mood:  Chronic  anxiety and depression controlled with home regimen   Home regimen:  Celexa 20 mg p.o. daily, Atarax 25 mg p.o. daily  Continue Celexa 20 mg p.o. daily  Continue Atarax 25 mg p.o. daily      Medical Decision Making/Goals of Care/Advance Care Planning:  Per my prior conversation on 2024  Life limiting disease: metastatic malignancy  Family: .    in .  Has 2 adult daughters and 2 adult sons.  Lives with son and his family.  1 daughter lives out of town and other 2 children live close by an  Performance status independent with ADLs and needs assistance with IADLs   Joys/meaning/strength: Tejinder understands that she has mets in the spine  Understanding of health:  understands that she has metastatic disease in the lungs and spine and chemotherapy may not be helpful.  She understands that surgery is high risk and she will need time to recover.  She understands chemotherapy is not curable.  She would like to know the results of the biopsy and if the cancer treatment then she will consider chemotherapy however if it is not treatable with chemotherapy then she would like to enroll in hospice  Information:Wants full disclosure  Goals: symptom control she would also like to know the results of the biopsy to help further medical decision making regarding treatment  Worries and fears now and future: none   Code status discussion:  We specifically discussed code status.  I explained that I have this conversation with all my patients so that their wishes can be respected in case of emergency and also to ease the burden on caregivers to make those decisions in the emergency.I explained that since she is in the hospital, it is important to discuss wishes for care during times when she is unable to tell us, so that we can provide the care that she she would want nerissa in the circumstance that she were to become very sick and her heart were to stop.  We discussed her wishes  regarding intubation/CPR in case of cardiopulmonary arrest. We discussed risks of CPR.  She stated that she wanted to be full code for now but doesn't want to be in a vegetative state with brain not functioning, not being aware of surroundings and not being able to communicate. This wouldn't be quality of life and would only cause suffering.  However she wants to discuss further with her family and get back to the primary team     Outcome of the conversation and documents completed: Consult  for advanced directive paperwork.  Patient will talk to family regarding CODE STATUS    Advance Directives  Existence of Advance Directives:No - has interest  Decision maker: Surrogate decision maker is oldest son with whom she lives  Code Status: Full code    Supportive Interventions: Interventions: Music Therapy: referral placed, Art Therapy: referral placed, [facility] Spiritual Care: referral placed     Disposition:  Please  start the process of having prior authorization with meds to beds deliver medications to patient prior to discharge via [facility] pharmacy. Prescriptions will need to be sent 48-72 hours prior to discharge so that a prior authorization can be completed.      Discharge date: unknown pending acute issues and pain control  Will assess if patient needs an appointment with Outpatient Supportive Oncology as appropriate     SIGNATURE AND BILLING:     High Complexity   Today, I am treating the patient for acute on chronic pain which is in severe exacerbation     Extensive DATA   Reviewed records from the following unique sources:  providers from oncology and primary services .  Diagnostic tests and information reviewed for today's visit:  Most recent labs and imaging results, Medications  Independently interpreted test CBC, CMP leukocytosis, anemia  Discussed plan of Care/management of pain with: Provider via shared electronic medical record/secure chat/email or face-to-face.     High risk of morbidity from  additional diagnostic testing or treatment  The patient is on parenteral controlled substances: Dilaudid IV as needed as above    Thank you for asking Supportive and Palliative Oncology to assist with care of this patient. Recommendations will be communicated back to the consulting service by way of shared electronic medical record/secure chat/email or face-to-face.  We will continue to follow.  Please contact us for additional questions or concerns.    SIGNATURE: Halina Pham MD   PAGER/CONTACT:  Contact information:  Supportive and Palliative Oncology  Monday-Friday 8 AM-5 PM  Epic Secure chat or pager [phone].  After hours and weekends:  pager [phone]

## 2024-08-06 NOTE — PROGRESS NOTES
"Kassandra Veras is a 58 y.o. female on day 6 of admission presenting with Mass of upper lobe of left lung.    Subjective   Patient is seen and examined this AM.  Reports acceptable appetite this AM.   Mobility is limited in the setting of pain.   Objective   Physical Exam  Vitals:    08/06/24 1455   BP: 94/51   Pulse: 75   Resp: 14   Temp:    SpO2: 97%   Constitutional:       General: She is awake. She is not in acute distress.     Appearance: Normal appearance.   HEENT:     Extraocular Movements: Extraocular movements intact, No palpable thyroid nodules.   Cardiovascular:      Rate and Rhythm: Normal rate and regular rhythm.      Heart sounds: Normal heart sounds. No murmur heard.  Pulmonary:      Effort: Pulmonary effort is normal. No respiratory distress.      Breath sounds: Normal breath sounds.   Abdominal:      General: Abdomen is flat. Bowel sounds are normal.      Palpations: Abdomen is soft.      Tenderness: There is no abdominal tenderness.   Skin:     General: Skin is warm and dry.   Neurological:      Mental Status: She is alert. Mental status is at baseline.  No delay in relaxation phase.   Psychiatric:         Behavior: Behavior is cooperative.    Last Recorded Vitals  Blood pressure 94/51, pulse 75, temperature 36 °C (96.8 °F), resp. rate 14, height 1.676 m (5' 6\"), weight 74.4 kg (164 lb), SpO2 97%.  Intake/Output last 3 Shifts:  I/O last 3 completed shifts:  In: 1892.7 (25.4 mL/kg) [I.V.:1892.7 (25.4 mL/kg)]  Out: - (0 mL/kg)   Dosing Weight: 74.4 kg     Relevant Results  Results from last 7 days   Lab Units 08/06/24  0915 08/05/24  0818 08/04/24  0726 08/03/24  1054 08/02/24  0902   GLUCOSE mg/dL 91 101* 90 108* 94     Current Facility-Administered Medications:     acetaminophen (Tylenol) tablet 975 mg, 975 mg, oral, q8h, Martín Eckert MD, 975 mg at 08/06/24 0931    atorvastatin (Lipitor) tablet 40 mg, 40 mg, oral, Daily, Savannah Young MD, 40 mg at 08/06/24 0931    citalopram (CeleXA) tablet 20 mg, " 20 mg, oral, Daily, Savannah Young MD, 20 mg at 08/06/24 0930    heparin 25,000 Units in dextrose 5% 250 mL (100 Units/mL) infusion (premix), 0-4,500 Units/hr, intravenous, Continuous, Marc Montez MD, Last Rate: 19 mL/hr at 08/06/24 1138, 1,900 Units/hr at 08/06/24 1138    heparin bolus from bag 3,000-6,000 Units, 3,000-6,000 Units, intravenous, q4h PRN, Savannah Young MD, 3,000 Units at 08/06/24 1138    HYDROmorphone (Dilaudid) injection 0.5 mg, 0.5 mg, intravenous, q4h PRN, Magda Palafox MD, 0.5 mg at 08/05/24 1258    hydrOXYzine HCL (Atarax) tablet 25 mg, 25 mg, oral, Daily, Savannah Young MD, 25 mg at 08/06/24 0930    naproxen (Naprosyn) tablet 500 mg, 500 mg, oral, BID, Marc Montez MD, 500 mg at 08/06/24 0930    oxyCODONE (Roxicodone) immediate release tablet 5 mg, 5 mg, oral, q4h PRN, Marc Montez MD    oxyCODONE ER (OxyCONTIN) 12 hr tablet 10 mg, 10 mg, oral, q12h PATRICE, Martín Eckert MD, 10 mg at 08/06/24 0931    pantoprazole (ProtoNix) EC tablet 40 mg, 40 mg, oral, Daily before breakfast, Marc Montez MD, 40 mg at 08/06/24 0930    polyethylene glycol (Glycolax, Miralax) packet 17 g, 17 g, oral, BID, Marc Montez MD    sennosides-docusate sodium (Indira-Colace) 8.6-50 mg per tablet 2 tablet, 2 tablet, oral, BID, Marc Montez MD, 2 tablet at 08/06/24 0932         Assessment/Plan   Principal Problem:    Mass of upper lobe of left lung  Active Problems:    Hypokalemia    HTN (hypertension)    Malignancy (Multi)  Kassandra Masker is a 58 y.o. female with PMHx of HLD, HTN, depression, anxiety  ED for work up after U/S findings of IVC and right renal vein thrombus- also in the light of significant metastatic disease in adrenal glands, spine -pathological T8 vb fx with lytic findings, epidural involvement, and left upper lobe lung with  high suspicion for primary lung cancer s/p bronch for LN biopsy for 8/2. Endocrine is consulted on 8/4/24  in the light of b/l adrenal masses and  borderline hypotension for preop optimization (initial plan).      Patient reports that her BP generally ran high - on lisinopril and amlodipine. However, in the past 2 months - has been low. She does endorse   Reports loss of balance.   No falls.   Endorses weight loss of 30 pounds for the past 2.5 months.   Endorsing back pain.   No hyperpigmentation.   No Hx of stroke or TIA.   No TCAs or  inhalers.   No Headaches, palpitations, and paresthesias.   Endorses hx of heavy nicotine use disorder.      Hospital course: No prednisone, no Hydrocortisone, no Methylprednisone.      Cortisol on 7/31 - 28 at 11;50 PM  Repeat Cortisol on 8/4 - 8 at 11 AM  No previous inpatient pred/solumedrol/hydrocortisone - prior to the above cortisol results.         CT abdomen:   1. Bilateral adrenal soft tissue masses with central necrosis  concerning for metastatic lesions. The large adrenal mass exerts some  mass effect on the adjacent kidney. There is tumor thrombus extending  from the right adrenal into the intrahepatic IVC with some superior  extension to the level of hepatic IVC.  2. Several soft tissue peritoneal deposits involving the pararenal  space and retroperitoneum as detailed above which are also suspicious  for metastatic disease.  3. Enlarged portacaval lymph nodes concerning for metastatic disease  involvement.  4. Lytic destructive lesion with mild pathologic compression  deformity of the T8 vertebral body without retropulsion. There is  ventral epidural tumor involvement at T7 and T8 with at least  moderate central canal stenosis at that level. Well-circumscribed  bone marrow lesion of the right iliac bone, which is nonspecific and  may be benign or malignant.  5. Several subcutaneous nodules are also suspicious for metastatic  disease.    In the light of a random cortisol - 7/31 of 28 at 11:50 PM - suspicion for Adrenal Insufficieny was low.     In order to corroborate that, STIM test performed with 250 mcg of  cosyntropin on 8/6 in the absence of hydrocortisone for >12 hours - confirmed adequate adrenal stress response with a stimulated cortisol of 28.3 and 29.8 from a baseline 17.3.  Hence, Adrenal Insufficiency is ruled out at this time.   Biochemically, Na:139, K:3.5, Ch: 101. Bicarb 29 reassuring. Symptoms and borderline low blood pressure explained in the setting current illness, drastic weight loss, and   Please involve Endocrine in the event of hemodynamic instability, sudden hypotension, sepsis, and concerns for hemorrhagic shock.   Endocrine is signing off.   Plan communicated to primary team.   Patient is discussed, seen, and examined with Dr. Brandt.  Sameer Roque MD

## 2024-08-07 ENCOUNTER — ANESTHESIA (OUTPATIENT)
Dept: OPERATING ROOM | Facility: HOSPITAL | Age: 59
End: 2024-08-07
Payer: COMMERCIAL

## 2024-08-07 LAB
ABO GROUP (TYPE) IN BLOOD: NORMAL
ACTH PLAS-MCNC: 4.6 PG/ML (ref 7.2–63.3)
ALBUMIN SERPL BCP-MCNC: 2.2 G/DL (ref 3.4–5)
ALBUMIN SERPL BCP-MCNC: 2.3 G/DL (ref 3.4–5)
ANION GAP SERPL CALC-SCNC: 10 MMOL/L (ref 10–20)
ANION GAP SERPL CALC-SCNC: 12 MMOL/L (ref 10–20)
ANTIBODY SCREEN: NORMAL
APTT PPP: 134 SECONDS (ref 27–38)
B2 GLYCOPROT1 IGA SER-ACNC: 0.9 U/ML
B2 GLYCOPROT1 IGG SER-ACNC: <1.4 U/ML
B2 GLYCOPROT1 IGM SER-ACNC: 0.7 U/ML
BUN SERPL-MCNC: 11 MG/DL (ref 6–23)
BUN SERPL-MCNC: 11 MG/DL (ref 6–23)
CALCIUM SERPL-MCNC: 7.8 MG/DL (ref 8.6–10.6)
CALCIUM SERPL-MCNC: 8.1 MG/DL (ref 8.6–10.6)
CARDIOLIPIN IGA SERPL-ACNC: 0.7 APL U/ML
CARDIOLIPIN IGG SER IA-ACNC: <1.6 GPL U/ML
CARDIOLIPIN IGM SER IA-ACNC: 0.9 MPL U/ML
CHLORIDE SERPL-SCNC: 102 MMOL/L (ref 98–107)
CHLORIDE SERPL-SCNC: 104 MMOL/L (ref 98–107)
CO2 SERPL-SCNC: 28 MMOL/L (ref 21–32)
CO2 SERPL-SCNC: 28 MMOL/L (ref 21–32)
CREAT SERPL-MCNC: 0.95 MG/DL (ref 0.5–1.05)
CREAT SERPL-MCNC: 1.1 MG/DL (ref 0.5–1.05)
EGFRCR SERPLBLD CKD-EPI 2021: 58 ML/MIN/1.73M*2
EGFRCR SERPLBLD CKD-EPI 2021: 69 ML/MIN/1.73M*2
ERYTHROCYTE [DISTWIDTH] IN BLOOD BY AUTOMATED COUNT: 15.5 % (ref 11.5–14.5)
ERYTHROCYTE [DISTWIDTH] IN BLOOD BY AUTOMATED COUNT: 15.5 % (ref 11.5–14.5)
GLUCOSE SERPL-MCNC: 114 MG/DL (ref 74–99)
GLUCOSE SERPL-MCNC: 78 MG/DL (ref 74–99)
HCT VFR BLD AUTO: 24.7 % (ref 36–46)
HCT VFR BLD AUTO: 26.7 % (ref 36–46)
HGB BLD-MCNC: 7.8 G/DL (ref 12–16)
HGB BLD-MCNC: 7.9 G/DL (ref 12–16)
INR PPP: 1.5 (ref 0.9–1.1)
MAGNESIUM SERPL-MCNC: 1.83 MG/DL (ref 1.6–2.4)
MCH RBC QN AUTO: 26.7 PG (ref 26–34)
MCH RBC QN AUTO: 26.8 PG (ref 26–34)
MCHC RBC AUTO-ENTMCNC: 29.6 G/DL (ref 32–36)
MCHC RBC AUTO-ENTMCNC: 31.6 G/DL (ref 32–36)
MCV RBC AUTO: 85 FL (ref 80–100)
MCV RBC AUTO: 91 FL (ref 80–100)
NRBC BLD-RTO: 0 /100 WBCS (ref 0–0)
NRBC BLD-RTO: 0 /100 WBCS (ref 0–0)
PHOSPHATE SERPL-MCNC: 3.4 MG/DL (ref 2.5–4.9)
PHOSPHATE SERPL-MCNC: 3.6 MG/DL (ref 2.5–4.9)
PLATELET # BLD AUTO: 643 X10*3/UL (ref 150–450)
PLATELET # BLD AUTO: 657 X10*3/UL (ref 150–450)
POTASSIUM SERPL-SCNC: 3.4 MMOL/L (ref 3.5–5.3)
POTASSIUM SERPL-SCNC: 3.4 MMOL/L (ref 3.5–5.3)
PROTHROMBIN TIME: 17.2 SECONDS (ref 9.8–12.8)
RBC # BLD AUTO: 2.92 X10*6/UL (ref 4–5.2)
RBC # BLD AUTO: 2.95 X10*6/UL (ref 4–5.2)
RH FACTOR (ANTIGEN D): NORMAL
SODIUM SERPL-SCNC: 139 MMOL/L (ref 136–145)
SODIUM SERPL-SCNC: 139 MMOL/L (ref 136–145)
UFH PPP CHRO-ACNC: 0.6 IU/ML
WBC # BLD AUTO: 13.7 X10*3/UL (ref 4.4–11.3)
WBC # BLD AUTO: 15.3 X10*3/UL (ref 4.4–11.3)

## 2024-08-07 PROCEDURE — 85520 HEPARIN ASSAY: CPT

## 2024-08-07 PROCEDURE — 99233 SBSQ HOSP IP/OBS HIGH 50: CPT

## 2024-08-07 PROCEDURE — 99233 SBSQ HOSP IP/OBS HIGH 50: CPT | Performed by: INTERNAL MEDICINE

## 2024-08-07 PROCEDURE — 83735 ASSAY OF MAGNESIUM: CPT

## 2024-08-07 PROCEDURE — 99255 IP/OBS CONSLTJ NEW/EST HI 80: CPT | Performed by: INTERNAL MEDICINE

## 2024-08-07 PROCEDURE — 2500000004 HC RX 250 GENERAL PHARMACY W/ HCPCS (ALT 636 FOR OP/ED)

## 2024-08-07 PROCEDURE — 2500000002 HC RX 250 W HCPCS SELF ADMINISTERED DRUGS (ALT 637 FOR MEDICARE OP, ALT 636 FOR OP/ED)

## 2024-08-07 PROCEDURE — 85613 RUSSELL VIPER VENOM DILUTED: CPT | Performed by: STUDENT IN AN ORGANIZED HEALTH CARE EDUCATION/TRAINING PROGRAM

## 2024-08-07 PROCEDURE — 99232 SBSQ HOSP IP/OBS MODERATE 35: CPT | Performed by: NURSE PRACTITIONER

## 2024-08-07 PROCEDURE — 85027 COMPLETE CBC AUTOMATED: CPT

## 2024-08-07 PROCEDURE — 85598 HEXAGNAL PHOSPH PLTLT NEUTRL: CPT | Performed by: STUDENT IN AN ORGANIZED HEALTH CARE EDUCATION/TRAINING PROGRAM

## 2024-08-07 PROCEDURE — 86901 BLOOD TYPING SEROLOGIC RH(D): CPT

## 2024-08-07 PROCEDURE — 2500000001 HC RX 250 WO HCPCS SELF ADMINISTERED DRUGS (ALT 637 FOR MEDICARE OP)

## 2024-08-07 PROCEDURE — 36415 COLL VENOUS BLD VENIPUNCTURE: CPT

## 2024-08-07 PROCEDURE — 1200000002 HC GENERAL ROOM WITH TELEMETRY DAILY

## 2024-08-07 PROCEDURE — 85610 PROTHROMBIN TIME: CPT

## 2024-08-07 PROCEDURE — 82565 ASSAY OF CREATININE: CPT

## 2024-08-07 PROCEDURE — 80069 RENAL FUNCTION PANEL: CPT

## 2024-08-07 PROCEDURE — 86923 COMPATIBILITY TEST ELECTRIC: CPT

## 2024-08-07 PROCEDURE — 2500000001 HC RX 250 WO HCPCS SELF ADMINISTERED DRUGS (ALT 637 FOR MEDICARE OP): Performed by: INTERNAL MEDICINE

## 2024-08-07 RX ORDER — HEPARIN SODIUM 10000 [USP'U]/100ML
0-4500 INJECTION, SOLUTION INTRAVENOUS CONTINUOUS
Status: DISCONTINUED | OUTPATIENT
Start: 2024-08-07 | End: 2024-08-07

## 2024-08-07 RX ORDER — HEPARIN SODIUM 10000 [USP'U]/100ML
0-4500 INJECTION, SOLUTION INTRAVENOUS CONTINUOUS
Status: ACTIVE | OUTPATIENT
Start: 2024-08-07 | End: 2024-08-07

## 2024-08-07 RX ORDER — NAPROXEN 500 MG/1
500 TABLET ORAL
Qty: 60 TABLET | Refills: 1 | Status: CANCELLED | OUTPATIENT
Start: 2024-08-07 | End: 2024-10-06

## 2024-08-07 RX ORDER — POTASSIUM CHLORIDE 20 MEQ/1
40 TABLET, EXTENDED RELEASE ORAL ONCE
Status: COMPLETED | OUTPATIENT
Start: 2024-08-07 | End: 2024-08-07

## 2024-08-07 ASSESSMENT — COGNITIVE AND FUNCTIONAL STATUS - GENERAL
CLIMB 3 TO 5 STEPS WITH RAILING: A LITTLE
DAILY ACTIVITIY SCORE: 23
DRESSING REGULAR LOWER BODY CLOTHING: A LITTLE
MOBILITY SCORE: 23

## 2024-08-07 NOTE — CARE PLAN
The patient's goals for the shift include      The clinical goals for the shift include pt. will remain stable.    Over the shift, the patient did not make progress toward the following goals. Barriers to progression include . Recommendations to address these barriers include .

## 2024-08-07 NOTE — PROGRESS NOTES
"Physical Therapy                 Therapy Communication Note    Patient Name: Kassandra Veras  MRN: [id number]  Today's Date: 8/7/2024     Discipline: Physical Therapy    Missed Visit Reason: Missed Visit Reason: Other (Comment)    Missed Time: Attempt    Comment:  PT eval orders received; per neurosurgery notes \"Possible OR pending final pathology results\". Will follow and re-attempt following OR.   "

## 2024-08-07 NOTE — PROGRESS NOTES
"Kassandra Veras is a 58 y.o. female on hospital day 7 of admission presenting with Mass of upper lobe of left lung.    Subjective   NAEON. Reports significantly better pain control with adjusted regimen yesterday. Still having difficulty getting a BM but regimen increased yesterday so will continue to monitor.          Objective     Physical Exam  Vitals reviewed.   Constitutional:       General: She is awake. She is not in acute distress.     Appearance: Normal appearance.   Eyes:      Extraocular Movements: Extraocular movements intact.   Cardiovascular:      Rate and Rhythm: Normal rate and regular rhythm.      Heart sounds: Normal heart sounds. No murmur heard.  Pulmonary:      Effort: Pulmonary effort is normal. No respiratory distress.      Breath sounds: Normal breath sounds.   Abdominal:      General: Abdomen is flat. Bowel sounds are normal.      Palpations: Abdomen is soft.      Tenderness: There is abdominal tenderness in the epigastric area and periumbilical area.   Skin:     General: Skin is warm and dry.   Neurological:      Mental Status: She is alert. Mental status is at baseline.   Psychiatric:         Behavior: Behavior is cooperative.       Last Recorded Vitals  Blood pressure 94/56, pulse 72, temperature 36 °C (96.8 °F), resp. rate 18, height 1.676 m (5' 6\"), weight 74.4 kg (164 lb), SpO2 94%.  Intake/Output last 24h:  No intake or output data in the 24 hours ending 08/07/24 0833    Relevant Results  Results from last 72 hours   Lab Units 08/07/24  0731 08/06/24  0915 08/06/24  0914 08/05/24  0818   WBC AUTO x10*3/uL 15.3*  --  15.8* 14.5*   HEMOGLOBIN g/dL 7.9*  --  8.1* 8.7*   HEMATOCRIT % 26.7*  --  26.4* 26.9*   PLATELETS AUTO x10*3/uL 643*  --  709* 718*   SODIUM mmol/L  --  139  --  142   POTASSIUM mmol/L  --  3.5  --  3.8   CHLORIDE mmol/L  --  101  --  104   CO2 mmol/L  --  29  --  31   BUN mg/dL  --  8  --  8   CREATININE mg/dL  --  0.70  --  0.70   GLUCOSE mg/dL  --  91  --  101*   CALCIUM " mg/dL  --  8.3*  --  8.6   MAGNESIUM mg/dL  --  1.85  --  1.97   PHOSPHORUS mg/dL  --  3.6  --  3.2   ALBUMIN g/dL  --  2.4*  --  2.5*     Scheduled medications  acetaminophen, 975 mg, oral, q8h  atorvastatin, 40 mg, oral, Daily  citalopram, 20 mg, oral, Daily  hydrOXYzine HCL, 25 mg, oral, Daily  naproxen, 500 mg, oral, BID  oxyCODONE ER, 10 mg, oral, q12h PATRICE  pantoprazole, 40 mg, oral, Daily before breakfast  polyethylene glycol, 17 g, oral, BID  sennosides-docusate sodium, 2 tablet, oral, BID      Continuous medications  heparin, 0-4,500 Units/hr, Last Rate: 19 Units/hr (08/06/24 2208)      PRN medications  PRN medications: heparin, HYDROmorphone, oxyCODONE         Assessment/Plan   Ms Veras is a 58 year old F, w/ PMHx of HLD, HTN, depression, anxiety, presents to  ED at the advice of her physician due to US findings of IVC and right renal vein thrombus. Imaging findings consistent with significant metastatic disease identified in adrenal glands, spine, and left upper lobe lung, unclear origin, however high suspicion for primary lung cancer.      #Suspected stage IV Non-small cell lung carcinoma  CTPE: Left upper lobe suprahilar mass with invasion into the mediastinum question of upper left pericardium involvement. Encasement of the left upper lobe pulmonary arterial branches. Enlarged subcarinal lymph node. 0.6 cm left upper lobe perifissural nodule and 0.6 cm right lower lobe pulmonary nodule.  -Cytology consistent with non-small cell lung carcinoma  Plan:  -s/p bronch for LN biopsy for 8/2  - Ongoing goals of care discussion with patient and her family - now DNR/DNI status  - Endocrinology consulted - concern for possible AI in setting of mets to bilateral adrenals - AM stim test not supportive of adrenal insufficiency, no need for steroid stress dosinig   - Supportive oncology consulted - appreciate recommendations  - Oncology consulted - no formal recs until a final biopsy result is available, appreciate  recommendations     #Right Iliac Osteolytic Lesions  #Ventral Epidural Lesion   #Pathologic T8 Compression Fracture   - CT Abd/pel w/ IV con: lytic destructive lesion with mild pathologic compression deformity of the T8 vertebral body without retropulsion. There is  ventral epidural tumor involvement at T7 and T8 with at least moderate central canal stenosis at that level.  Plan:  -Neurosurgery consulted - planning for OR tomorrow, however patient is not prepared at this time to commit to surgery; ongoing conversation with neurosurgery regarding outpatient vs inpatient treatment  -Oxycontin 10mg q12 hrs + Naproxen 500mg BID + PRN dilaudid 0.5 + oxycodone 5mg q4 for pain - see supportive onc pain recommendations     #Pre-operative risk stratification  - Patient is class I risk level per RCRI scoring.   - Patient is medically optimized at this time. No further intervention.      #Anemia   #Thrombocythemia   #Intrahepatic IVC Thrombus   -No evidence of overt GI bleed. Presentation likely iso of malignancy. Patient with no history of vascular disease or history of blood clots  Plan:  -Vascular medicine consulted 8/1, appreciate recommendations              - LE duplex to evaluate for DVTs negative  - Echo 8/3 with concern for IVC thrombus              - Continue heparin gtt for now   - Need to differentiate between bland tumor vs tumor + thrombus - ideally PET to evaluate, should be done outpatient  - AC plan as of 8/7:  - Start Eliquis 10 mg at 8pm this evening; STOP heparin 1 hour after Eliquis  - APLA labs drawn today, pending  - Outpatient follow up requested with Dr. Lisset White      #HTN  #HLD   -Holding home HTN meds iso soft pressures since admission   -C/w Atorvastatin 40 mg once daily     #Depression   #Anxiety   -C/w home citalopram and hydroxyzine      F: PRN  E: PRN  N: regular  A: pIV  DVT: Heparin gtt  GI: Protonix     Bowel Regimen: Miralax + Doc-Senna  Code Status: DNR/DNI  NOK: Adan Veras (son):  [phone]       Patient seen and staffed with attending physician. Recommendations not final until attested.   Marc Montez MD

## 2024-08-07 NOTE — PROGRESS NOTES
SUPPORTIVE AND PALLIATIVE ONCOLOGY INPATIENT FOLLOW-UP      SERVICE DATE: 08/07/24     SUBJECTIVE:  Interval Events:   -s/p bronch for LN biopsy for 8/2   -Cytology from EBUS/FNA consistent with non-small cell lung carcinoma.  Awaiting oncology input regarding treatment options prior to discussing  NSGY option     Pain Assessment:  Location:  Mid back  Duration: Constant  Characteristics:   Rating: 3 aggravated to 8/10   Descriptors: sharp   Aggravating: movement, sitting for long   Relieving: Analgesics positioning, resting   Interference with Function: A little    Opioid Use  Past 24 h prn opioid use:   Dilaudid 0.5 mg IV every 4 hours as needed.  Used 0 dose/24 hours  oxycodone IR 5 mg every 4 hours as needed.  Used 1 dose/24 hours= 5 mg oxycodone/24 hours= 6.25 mg OME  Total 24h OME use: 26.25 mg OME  Continue OxyContin 10 mg p.o. twice daily    Note: OME calculations based on equianalgesic table below. Please note this table is based on best available evidence but conversions are still approximate. These are NOT opioid DOSES for individual patient use; this is equivalency information.  Drug Parenteral Enteral   Morphine 10 25   Oxycodone N/A 20   Hydromorphone 2 5   Fentanyl 0.15 N/A   Tramadol N/A 120   Citation: Camila PANIAGUA. Demystifying opioid conversion calculations: A guide for effective dosing, Second edition. [place]: [employer]-System Pharmacists, 2018.    Symptom Assessment:    Nausea none  Constipation none last bowel movement in the morning  Shortness of breath none  Lack of appetite none  Difficulty Sleeping none    Information obtained from: chart review, interview of patient, and discussion with primary team  ______________________________________________________________________        OBJECTIVE:  Results from last 7 days   Lab Units 08/07/24  0731 08/06/24  0914 08/05/24  0818 08/04/24  0726 08/03/24  1054 08/02/24  0902 08/01/24  1125   WBC AUTO x10*3/uL 15.3* 15.8* 14.5*    < > 17.7* 10.7 13.0*   HEMOGLOBIN g/dL 7.9* 8.1* 8.7*   < > 8.0* 7.9* 9.1*   HEMATOCRIT % 26.7* 26.4* 26.9*   < > 25.3* 24.4* 29.2*   PLATELETS AUTO x10*3/uL 643* 709* 718*   < > 644* 579* 566*   NEUTROS PCT AUTO %  --   --   --   --  80.9 74.4 75.6   LYMPHS PCT AUTO %  --   --   --   --  10.6 15.0 15.0   MONOS PCT AUTO %  --   --   --   --  7.0 7.5 6.2   EOS PCT AUTO %  --   --   --   --  0.0 1.5 1.5    < > = values in this interval not displayed.     Results from last 7 days   Lab Units 08/07/24  0730 08/06/24  0915 08/05/24  0818 08/01/24  0402 07/31/24  2312 07/31/24  1750   SODIUM mmol/L 139 139 142   < > 131* 134*   POTASSIUM mmol/L 3.4* 3.5 3.8   < > 3.3* 2.7*   CHLORIDE mmol/L 102 101 104   < > 97* 94*   CO2 mmol/L 28 29 31   < > 22 26   BUN mg/dL 11 8 8   < > 18 18   CREATININE mg/dL 0.95 0.70 0.70   < > 1.29* 1.49*   CALCIUM mg/dL 7.8* 8.3* 8.6   < > 8.0* 8.7   PROTEIN TOTAL g/dL  --   --   --   --  6.1* 7.3   BILIRUBIN TOTAL mg/dL  --   --   --   --  0.6 0.8   ALK PHOS U/L  --   --   --   --  154* 188*   ALT U/L  --   --   --   --  11 16   AST U/L  --   --   --   --  14 25   GLUCOSE mg/dL 78 91 101*   < > 109* 121*    < > = values in this interval not displayed.     Estimated Creatinine Clearance: 64.9 mL/min (by C-G formula based on SCr of 0.95 mg/dL).     Scheduled medications  acetaminophen, 975 mg, oral, q8h  atorvastatin, 40 mg, oral, Daily  citalopram, 20 mg, oral, Daily  hydrOXYzine HCL, 25 mg, oral, Daily  naproxen, 500 mg, oral, BID  oxyCODONE ER, 10 mg, oral, q12h PATRICE  pantoprazole, 40 mg, oral, Daily before breakfast  polyethylene glycol, 17 g, oral, BID  sennosides-docusate sodium, 2 tablet, oral, BID      Continuous medications  heparin, 0-4,500 Units/hr, Last Rate: 19 Units/hr (08/06/24 2208)      PRN medications  heparin, 3,000-6,000 Units, q4h PRN  HYDROmorphone, 0.5 mg, q4h PRN  oxyCODONE, 5 mg, q4h PRN      }   PHYSICAL EXAMINATION:    Vital Signs:   Vital signs reviewed  Visit  Vitals  BP 94/56   Pulse 72   Temp 36 °C (96.8 °F)   Resp 18      0-10 (Numeric) Pain Score: 8     Physical Exam  Vitals reviewed.   Constitutional:       General: She is not in acute distress.  HENT:      Head: Normocephalic and atraumatic.      Mouth/Throat:      Mouth: Mucous membranes are moist.   Eyes:      Extraocular Movements: Extraocular movements intact.      Conjunctiva/sclera: Conjunctivae normal.      Pupils: Pupils are equal, round, and reactive to light.   Cardiovascular:      Rate and Rhythm: Normal rate and regular rhythm.      Heart sounds: Normal heart sounds.   Pulmonary:      Effort: Pulmonary effort is normal. No respiratory distress.   Abdominal:      General: Bowel sounds are normal. There is no distension.      Palpations: Abdomen is soft.   Musculoskeletal:         General: Normal range of motion.   Skin:     General: Skin is warm and dry.   Neurological:      General: No focal deficit present.      Mental Status: She is alert and oriented to person, place, and time.   Psychiatric:         Mood and Affect: Mood normal.         Behavior: Behavior normal.        ASSESSMENT/PLAN:  58 y.o. female with h/o depression, anxiety admitted with IVC and right renal vein thrombus. Work up showed significant metastatic disease in adrenal glands, spine, and left upper lobe lung, unclear origin, suspicion for primary lung cancer  s/p LN biopsy . Pt's  recently  of cancer and he underwent chem. Per team pts may prefer to avoid chemo and mentioning hospice. Neurosurg plans OR this week for T8 compression fracture,T6-T10 fusion for  Supportive onc consulted for intro to services, and goc and pain management.      Neoplasm related back pain due to spinal mets and T8 compression fracture.  Somatic.  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  Patient prefers to establish outpatient oncology care in [place]  -Plan for surgery tomorrow for T8 compression fracture    OARRS/PDMP reviewed no aberrant behavior noted.consistent with  prescriptions/records and patient history   Pain is: cancer related pain  Type: somatic  Pain control: sub-optimally controlled  Home regimen:   Ibuprofen 200 mg 3 tablets/day  Intolerances/previously tried: None  Personalized pain goal: 4  Total OME usage for the past 24 hours: 26.25 mg ome     Continue Tylenol 975 mg p.o. every 8 hours scheduled  Continue naproxen 500 mg p.o. twice daily.  Continue PPI for GI protection   Continue Dilaudid 0.5 mg IV every 4 hours as needed.    Continue oxycodone IR 5 mg every 4 hours as needed  Continue to monitor pain scores and administer PRN medications as appropriate  Continue/initiate nonpharmacologic pain management strategies including ice/heat therapy, distraction techniques, deep breathing/relaxation techniques, calming music, and repositioning  Continue to monitor for signs of opioid efficacy (pain scores, improved functionality) and toxicity (pinpoint pupils, excess sedation/drowsiness/confusion, respiratory depression, etc.)     At risk for nausea without vomiting related to opioids Well-controlled  Home regimen:  none   Monitor      At risk for constipation related to opioids, currently constipated  Usual bowel pattern: every other day  Home regimen: none  LBM today morning  Continue MiraLAX   twice a day.   Didn't take dose yesterday  Continue Doc senna 2 tabs p.o. twice daily.    Warm water  Prune juice  Encourage mobility as tolerated, PT/OT following   Monitor BM frequency, adjust regimen as needed  Goal to have BM without straining q48-72h     Altered Mood:  Chronic  anxiety and depression controlled with home regimen   Home regimen:  Celexa 20 mg p.o. daily, Atarax 25 mg p.o. daily  Continue Celexa 20 mg p.o. daily  Continue Atarax 25 mg p.o. daily      Medical Decision Making/Goals of Care/Advance Care Planning:  Per my prior conversation on 2024  Life limiting disease: metastatic malignancy  Family: .    in .  Has 2 adult daughters and 2 adult sons.  Lives with son and his family.  1 daughter lives out of town and other 2 children live close by an  Performance status independent with ADLs and needs assistance with IADLs   Joys/meaning/strength: Tejinder understands that she has mets in the spine  Understanding of health:  understands that she has metastatic disease in the lungs and spine and chemotherapy may not be helpful.  She understands that surgery is high risk and she will need time to recover.  She understands chemotherapy is not curable.  She would like to know the results of the biopsy and if the cancer treatment then she will consider chemotherapy however if it is not treatable with chemotherapy then she would like to enroll in hospice  Information:Wants full disclosure  Goals: symptom control she would also like to know the results of the biopsy to help further medical decision making regarding treatment  Worries and fears now and future: none   Code status discussion:  We specifically discussed code status.  I explained that I have this conversation with all my patients so that their wishes can be respected in case of emergency and also to ease the burden on caregivers to make those decisions in the emergency.I explained that since she is in the  hospital, it is important to discuss wishes for care during times when she is unable to tell us, so that we can provide the care that she she would want nerissa in the circumstance that she were to become very sick and her heart were to stop.  We discussed her wishes regarding intubation/CPR in case of cardiopulmonary arrest. We discussed risks of CPR.  She stated that she wanted to be full code for now but doesn't want to be in a vegetative state with brain not functioning, not being aware of surroundings and not being able to communicate. This wouldn't be quality of life and would only cause suffering.  However she wants to discuss further with her family and get back to the primary team     Outcome of the conversation and documents completed: Consult  for advanced directive paperwork.  Patient will talk to family regarding CODE STATUS    Advance Directives  Existence of Advance Directives:No - has interest  Decision maker: Surrogate decision maker is oldest son with whom she lives  Code Status: Full code    Supportive Interventions: Interventions: Music Therapy: referral placed, Art Therapy: referral placed, [facility] Spiritual Care: referral placed     Disposition:  Please  start the process of having prior authorization with meds to beds deliver medications to patient prior to discharge via [facility]. Prescriptions will need to be sent 48-72 hours prior to discharge so that a prior authorization can be completed.      Discharge date: unknown pending acute issues and pain control  Will assess if patient needs an appointment with Outpatient Supportive Oncology as appropriate     SIGNATURE AND BILLING:     High Complexity   Today, I am treating the patient for acute on chronic pain which is in severe exacerbation     Extensive DATA   Reviewed records from the following unique sources:  providers from oncology and primary services .  Diagnostic tests and information reviewed for today's visit:  Most  recent labs and imaging results, Medications  Independently interpreted test CBC, CMP leukocytosis, anemia, thrombocytosis  Discussed plan of Care/management of pain with: Provider via shared electronic medical record/secure chat/email or face-to-face.     High risk of morbidity from additional diagnostic testing or treatment  The patient is on parenteral controlled substances: Dilaudid IV as needed as above    Thank you for asking Supportive and Palliative Oncology to assist with care of this patient. Recommendations will be communicated back to the consulting service by way of shared electronic medical record/secure chat/email or face-to-face.  We will continue to follow.  Please contact us for additional questions or concerns.    SIGNATURE: Halina Pham MD   PAGER/CONTACT:  Contact information:  Supportive [facility] Palliative Oncology  Monday-Friday 8 AM-5 PM  [employer] [facility] or pager [phone].  After hours and weekends:  pager [phone]

## 2024-08-07 NOTE — PROGRESS NOTES
Subjective   Kassandra Veras is a 58 y.o. female on day 7 of admission   No acute events overnight    Objective   Physical Exam  AOx3  RUE D5 B5 T4+ HG/IO 4+  RLE HF4+ KE5 DF/PF 5  LUE D5 B5 T5 HG/IO 5  LLE HF4+ KE5 DF/PF 5  SILT    Assessment/Plan   Kassandra is a 58 y.o. female with h/o HTN, HLD, depression, anxiety p/w hypotension and tachycardia, RUQ US IVC thrombus, R renal mass, CT PE L perihilar mass encasing L pulmonary artery, CT AP b/l adrenal masses, R adrenal mass w/ extension into infrahepatic IVC, T8 compression fracture w 50% height loss, ventral epidural lesion, R iliac osteolytic lesion     PLAN  DNR/DNI  [facility] primary  Possible OR pending final pathology results and treatment sensitivity, will discuss with primary team regarding prospective treatment options and patient preference  Supportive onc recs  Endo recs  Neurosurgery will continue to follow    Carole Lockwood MD

## 2024-08-07 NOTE — ANESTHESIA PREPROCEDURE EVALUATION
Patient: Kassandra Veras    Procedure Information       Date/Time: 08/08/24 0915    Procedure: Decompression Spine with Instrumentation - T6-10 laminectomy and posteriolateral fusion, T8 transpedicular decompression, use of navigation, with medtronic    Location: [facility] OR 10 / [facility] OR    Surgeons: Sunil Dalton MD PhD          Vitals:    08/07/24 1310   BP: 86/52   Pulse: 75   Resp: 18   Temp:    SpO2: 97%       History reviewed. No pertinent surgical history.  Past Medical History:   Diagnosis Date    Hyperlipidemia     Hypertension        Current Facility-Administered Medications:     acetaminophen (Tylenol) tablet 975 mg, 975 mg, oral, q8h, Martín Eckert MD, 975 mg at 08/07/24 0204    apixaban (Eliquis) tablet 10 mg, 10 mg, oral, BID, Marc Montez MD    atorvastatin (Lipitor) tablet 40 mg, 40 mg, oral, Daily, Savannah Young MD, 40 mg at 08/07/24 0829    citalopram (CeleXA) tablet 20 mg, 20 mg, oral, Daily, Savannah Young MD, 20 mg at 08/07/24 0828    heparin 25,000 Units in dextrose 5% 250 mL (100 Units/mL) infusion (premix), 0-4,500 Units/hr, intravenous, Continuous, Marc Montez MD, Last Rate: 19 mL/hr at 08/07/24 1325, 1,900 Units/hr at 08/07/24 1325    heparin bolus from bag 3,000-6,000 Units, 3,000-6,000 Units, intravenous, q4h PRN, Savannah Young MD, 3,000 Units at 08/06/24 1138    HYDROmorphone (Dilaudid) injection 0.5 mg, 0.5 mg, intravenous, q4h PRN, Magda Palafox MD, 0.5 mg at 08/05/24 1258    hydrOXYzine HCL (Atarax) tablet 25 mg, 25 mg, oral, Daily, Savannah Young MD, 25 mg at 08/07/24 0829    naproxen (Naprosyn) tablet 500 mg, 500 mg, oral, BID, Marc Montez MD, 500 mg at 08/07/24 0829    oxyCODONE (Roxicodone) immediate release tablet 5 mg, 5 mg, oral, q4h PRN, Marc Mnotez MD, 5 mg at 08/07/24 0204    oxyCODONE ER (OxyCONTIN) 12 hr tablet 10 mg, 10 mg, oral, q12h PATRICE, Martín Eckert MD, 10 mg at 08/07/24 0827    pantoprazole (ProtoNix) EC tablet 40 mg, 40  mg, oral, Daily before breakfast, Marc Montez MD, 40 mg at 08/07/24 0604    polyethylene glycol (Glycolax, Miralax) packet 17 g, 17 g, oral, BID, Marc Montez MD    sennosides-docusate sodium (Indira-Colace) 8.6-50 mg per tablet 2 tablet, 2 tablet, oral, BID, Marc Montez MD, 2 tablet at 08/07/24 0829  Prior to Admission medications    Medication Sig Start Date End Date Taking? Authorizing Provider   atorvastatin (Lipitor) 40 mg tablet Take 1 tablet (40 mg) by mouth once daily in the evening.   Yes Historical Provider, MD   citalopram (CeleXA) 20 mg tablet Take 1 tablet (20 mg) by mouth once daily in the evening. 7/22/24  Yes Historical Provider, MD   hydrOXYzine HCL (Atarax) 25 mg tablet Take 1 tablet (25 mg) by mouth once daily in the morning.   Yes Historical Provider, MD   lisinopriL-hydrochlorothiazide 10-12.5 mg tablet Take 1 tablet by mouth once daily in the morning.   Yes Historical Provider, MD   potassium chloride CR 10 mEq ER tablet Take 1 tablet (10 mEq) by mouth once daily in the morning. 7/25/24 8/24/24 Yes Historical Provider, MD     No Known Allergies  Social History     Tobacco Use    Smoking status: Every Day     Types: Cigarettes    Smokeless tobacco: Never   Substance Use Topics    Alcohol use: Not on file         Chemistry    Lab Results   Component Value Date/Time     08/07/2024 0730    K 3.4 (L) 08/07/2024 0730     08/07/2024 0730    CO2 28 08/07/2024 0730    BUN 11 08/07/2024 0730    CREATININE 0.95 08/07/2024 0730    Lab Results   Component Value Date/Time    CALCIUM 7.8 (L) 08/07/2024 0730    ALKPHOS 154 (H) 07/31/2024 2312    AST 14 07/31/2024 2312    ALT 11 07/31/2024 2312    BILITOT 0.6 07/31/2024 2312          Lab Results   Component Value Date/Time    WBC 15.3 (H) 08/07/2024 0731    HGB 7.9 (L) 08/07/2024 0731    HCT 26.7 (L) 08/07/2024 0731     (H) 08/07/2024 0731     Lab Results   Component Value Date/Time    PROTIME 18.1 (H) 08/01/2024 0402    INR  1.6 (H) 08/01/2024 0402     Encounter Date: 07/31/24   ECG 12 lead   Result Value    Ventricular Rate 105    Atrial Rate 105    MN Interval 100    QRS Duration 78    QT Interval 380    QTC Calculation(Bazett) 502    R Axis 8    T Axis 92    QRS Count 18    Q Onset 221    P Onset 171    P Offset 197    T Offset 411    QTC Fredericia 457    Narrative    Sinus tachycardia with short MN  Nonspecific ST and T wave abnormality  Abnormal ECG  No previous ECGs available    See ED provider note for full interpretation and clinical correlation  Confirmed by Karen Velazquez ([id number]) on 7/31/2024 8:52:20 PM     No results found for this or any previous visit from the past 1095 days.        Relevant Problems   Cardiac   (+) HTN (hypertension)      Musculoskeletal   (+) Chronic low back pain   Ms Eda is a 58 year old F, w/ PMHx of HLD, HTN, depression, anxiety, presents to  ED at the advice of her physician due to US findings of IVC and right renal vein thrombus. Imaging findings consistent with significant metastatic disease identified in adrenal glands, spine, and left upper lobe lung, unclear origin, however high suspicion for primary lung cancer. Discussed with patient the need for endotracheal intubation, invasive monitoring, central access, possible need for prolonged intubation, vasopressor support. Patient states she has no formal documentation on DNR/DNI status and has not officially become DNR/DNI.     Clinical information reviewed:   Tobacco  Allergies  Meds  Problems  Med Hx  Surg Hx   Fam Hx  Soc   Hx        NPO Detail:  NPO 8 hours     Physical Exam    Airway  Mallampati: II  TM distance: >3 FB  Neck ROM: full     Cardiovascular   Rhythm: regular  Rate: normal     Dental   Comments: Edentulous   Pulmonary   Breath sounds clear to auscultation     Abdominal   (+) obese  Abdomen: soft             Anesthesia Plan    History of general anesthesia?: yes  History of complications of general anesthesia?: no    ASA  4     general     intravenous induction   Postoperative administration of opioids is intended.  Trial extubation is planned.  Anesthetic plan and risks discussed with patient.  Use of blood products discussed with patient who consented to blood products.    Plan discussed with attending and CAA.

## 2024-08-07 NOTE — PROGRESS NOTES
Kassandra Veras is a 58 y.o. female on day 7 of admission, presenting with IVC thrombus.   Medical history significant for HLD, HTN, daily tobacco use (1ppd x45 years).  Presented to [facility] for further evaluation of outpatient imaging significant for right upper pole renal mass 10 x 6 x 7 cm, likely renal neoplasm, with extension into the IVC with thrombus measuring 4.5 x 2.4 x 2.8 cm.   Further evaluation showed suspected stage IV non-small cell lung cancer, and she is s/p LN bx 8/2/24, as well as lytic destructive lesion with mild pathologic compression deformity T8 vertebral body with ventral epidural tumor involvement at T7-8.    NSGY consulted, and has proposed surgical resection for 8/8/24, but unsure at this time if patient is agreeable to surgical intervention.   Vascular Medicine Service consulted for anticoagulation recommendations for management of IVC thrombus.  Completed JENNA 8/3/24 that showed an obstructive echodensity in the IVC near the junction of the RA which may be tumor thrombus vs bland thrombus.  Plan for outpatient PET scan to delineate thrombus.   Continues with Heparin infusion.     Subjective   Discussed with patient need to transition to home-going anticoagulation; offered Lovenox as preferred therapy, and patient refused as she is unable to administer medication via injection.  Reports pain in the lower back on the right side extending into the buttocks and the right leg.  Denies CP, SOB or bleeding.      Objective   Continues with Heparin infusion.     Physical Exam  Resting in bed in NAD  Respirations shallow but regular, breath sounds CTA all anterior lung fields; on RA  Normal heart sounds with regular rate/rhythm; vital signs are stable with soft BP 94/56  Abdomen soft and nontender  Extremities are warm to touch with palpable bilateral radial and DP pulses  Patient is awake and participates in conversation; tells me how she will not administer Lovenox injections to herself, nor can  "anyone in her home help with injections as they are all \"afraid of needles\"; requests another anticoagulant for home going.     Last Recorded Vitals  Blood pressure 94/56, pulse 72, temperature 36 °C (96.8 °F), resp. rate 18, height 1.676 m (5' 6\"), weight 74.4 kg (164 lb), SpO2 94%.  Intake/Output last 3 Shifts:  I/O last 3 completed shifts:  In: 1892.7 (25.4 mL/kg) [I.V.:1892.7 (25.4 mL/kg)]  Out: - (0 mL/kg)   Dosing Weight: 74.4 kg     Relevant Results  Scheduled medications  acetaminophen, 975 mg, oral, q8h  atorvastatin, 40 mg, oral, Daily  citalopram, 20 mg, oral, Daily  hydrOXYzine HCL, 25 mg, oral, Daily  naproxen, 500 mg, oral, BID  oxyCODONE ER, 10 mg, oral, q12h PATRICE  pantoprazole, 40 mg, oral, Daily before breakfast  polyethylene glycol, 17 g, oral, BID  sennosides-docusate sodium, 2 tablet, oral, BID      Continuous medications  heparin, 0-4,500 Units/hr, Last Rate: 19 Units/hr (08/06/24 2208)      Results from last 7 days   Lab Units 08/07/24  0731 08/06/24  0914 08/05/24  0818   WBC AUTO x10*3/uL 15.3* 15.8* 14.5*   HEMOGLOBIN g/dL 7.9* 8.1* 8.7*   HEMATOCRIT % 26.7* 26.4* 26.9*   PLATELETS AUTO x10*3/uL 643* 709* 718*       Results from last 7 days   Lab Units 08/07/24  0730 08/06/24  0915 08/05/24  0818   SODIUM mmol/L 139 139 142   POTASSIUM mmol/L 3.4* 3.5 3.8   CHLORIDE mmol/L 102 101 104   CO2 mmol/L 28 29 31   BUN mg/dL 11 8 8   CREATININE mg/dL 0.95 0.70 0.70   GLUCOSE mg/dL 78 91 101*   CALCIUM mg/dL 7.8* 8.3* 8.6   Estimated Creatinine Clearance: 64.9 mL/min (by C-G formula based on SCr of 0.95 mg/dL).      Results from last 7 days   Lab Units 08/07/24  0730 08/06/24  2105 08/06/24  1643   ANTI XA UNFRACTIONATED IU/mL 0.6 0.4 0.5       VASC US LOWER EXTREMITY VENOUS DUPLEX BILATERAL   Study Date: 8/6/2024   PRELIMINARY CONCLUSIONS:  Right Lower Venous: No evidence of acute deep vein thrombus visualized in the right lower extremity. Slow blood flow noted in common femoral, femoral and " "popliteal veins.  Left Lower Venous: No evidence of acute deep vein thrombus visualized in the left lower extremity. Slow blood flow noted in common femoral, femoral and popliteal veins.         Assessment/Plan   Principal Problem:    Mass of upper lobe of left lung  Active Problems:    Hypokalemia    HTN (hypertension)    Malignancy (Multi)    58 year old female with medical history as described above.   Presented to [facility] for further evaluation of findings of right renal mass with extension into the IVC.   Further hospital workup shows suspected stage IV non-small cell lung cancer, and she is s/p LN bx 8/2/24, as well as lytic lesions with pathologic compression of T7-8.    NSGY consulted, and has proposed surgical resection for 8/8/24, but unsure at this time if patient is agreeable to surgical intervention.   Vascular Medicine Service following for anticoagulation recommendations for management of IVC thrombus.  Continues with Heparin infusion.   Review of labs today shows stable hemoglobin 7.9 grams, stable thrombocytosis 643K, and stable serum creatinine 0.95 with creatinine clearance of 64 ml/minute.  Discussion with patient: Recommended use of weight based Lovenox for home going anticoagulation therapy given extensive clot burden and unclear treatment plan for her cancer at this time. Patient refuses Lovenox citing her fear of needles.  I spoke with Dr. White, and will now recommend use of Eliquis as our second choice for anticoagulation. Patient is agreeable with this plan.   Patient given Eliquis Med Alert band.     Recommendations:  ~CONTINUE Heparin infusion; follow nomogram to achieve therapeutic assay 0.3-0.7  ~MONITOR for bleeding  ~START Eliquis 10mg at 8pm this evening; STOP Heparin infusion 1 hour after initial dose of Eliquis.  ~Eliquis dosing: Eliquis 10mg q12  hours (8a/8p) x7 days, then start Eliquis 5mg q12 hours and continue for duration of therapy.  Please utilize \"Meds To Beds\" for " Eliquis starter pack  ~Will need APLA labs drawn today: Anticardiolipin Ab, Beta-2 Glycoprotein Ab and Lupus Anticoagulant (DRVVT and SCT).  ~Will need PET scan completed as outpatient to determine tumor thrombus vs bland thrombus in the IVC/RA  ~I have requested outpatient follow up with Dr. Lisset White.  ~Tobacco cessation.     Plan of care discussed with Dr. Lisset White  Plan of care discussed with Dr. Marc Montez from the [facility] Team     FLORENTINO Soto-CNP  Vascular Medicine Service   Pager [phone]

## 2024-08-08 PROBLEM — M54.50 CHRONIC LOW BACK PAIN: Status: ACTIVE | Noted: 2024-08-08

## 2024-08-08 PROBLEM — G89.29 CHRONIC LOW BACK PAIN: Status: ACTIVE | Noted: 2024-08-08

## 2024-08-08 LAB
ABO GROUP (TYPE) IN BLOOD: NORMAL
ACTH PLAS-MCNC: 2 PG/ML (ref 7.2–63.3)
ACTH PLAS-MCNC: <1.5 PG/ML (ref 7.2–63.3)
ALBUMIN SERPL BCP-MCNC: 2.2 G/DL (ref 3.4–5)
ANION GAP SERPL CALC-SCNC: 13 MMOL/L (ref 10–20)
ANTIBODY SCREEN: NORMAL
APPEARANCE UR: CLEAR
APTT PPP: 118 SECONDS (ref 27–38)
ATRIAL RATE: 81 BPM
BILIRUB UR STRIP.AUTO-MCNC: NEGATIVE MG/DL
BUN SERPL-MCNC: 10 MG/DL (ref 6–23)
CALCIUM SERPL-MCNC: 8.3 MG/DL (ref 8.6–10.6)
CHLORIDE SERPL-SCNC: 105 MMOL/L (ref 98–107)
CO2 SERPL-SCNC: 29 MMOL/L (ref 21–32)
COLOR UR: YELLOW
CREAT SERPL-MCNC: 0.87 MG/DL (ref 0.5–1.05)
EGFRCR SERPLBLD CKD-EPI 2021: 76 ML/MIN/1.73M*2
ERYTHROCYTE [DISTWIDTH] IN BLOOD BY AUTOMATED COUNT: 15.7 % (ref 11.5–14.5)
GLUCOSE SERPL-MCNC: 74 MG/DL (ref 74–99)
GLUCOSE UR STRIP.AUTO-MCNC: NORMAL MG/DL
HCT VFR BLD AUTO: 24.9 % (ref 36–46)
HGB BLD-MCNC: 7.8 G/DL (ref 12–16)
INR PPP: 1.3 (ref 0.9–1.1)
INR PPP: 1.6 (ref 0.9–1.1)
KETONES UR STRIP.AUTO-MCNC: NEGATIVE MG/DL
LEUKOCYTE ESTERASE UR QL STRIP.AUTO: NEGATIVE
MAGNESIUM SERPL-MCNC: 1.86 MG/DL (ref 1.6–2.4)
MCH RBC QN AUTO: 27.8 PG (ref 26–34)
MCHC RBC AUTO-ENTMCNC: 31.3 G/DL (ref 32–36)
MCV RBC AUTO: 89 FL (ref 80–100)
MUCOUS THREADS #/AREA URNS AUTO: NORMAL /LPF
NITRITE UR QL STRIP.AUTO: NEGATIVE
NRBC BLD-RTO: 0.2 /100 WBCS (ref 0–0)
P AXIS: 33 DEGREES
P OFFSET: 196 MS
P ONSET: 146 MS
PH UR STRIP.AUTO: 6 [PH]
PHOSPHATE SERPL-MCNC: 4.1 MG/DL (ref 2.5–4.9)
PLATELET # BLD AUTO: 711 X10*3/UL (ref 150–450)
POTASSIUM SERPL-SCNC: 3.7 MMOL/L (ref 3.5–5.3)
PR INTERVAL: 154 MS
PROT UR STRIP.AUTO-MCNC: ABNORMAL MG/DL
PROTHROMBIN TIME: 15 SECONDS (ref 9.8–12.8)
PROTHROMBIN TIME: 18.1 SECONDS (ref 9.8–12.8)
Q ONSET: 223 MS
QRS COUNT: 13 BEATS
QRS DURATION: 84 MS
QT INTERVAL: 386 MS
QTC CALCULATION(BAZETT): 448 MS
QTC FREDERICIA: 426 MS
R AXIS: 69 DEGREES
RBC # BLD AUTO: 2.81 X10*6/UL (ref 4–5.2)
RBC # UR STRIP.AUTO: ABNORMAL /UL
RBC #/AREA URNS AUTO: NORMAL /HPF
RH FACTOR (ANTIGEN D): NORMAL
SODIUM SERPL-SCNC: 143 MMOL/L (ref 136–145)
SP GR UR STRIP.AUTO: 1.02
SQUAMOUS #/AREA URNS AUTO: NORMAL /HPF
T AXIS: 55 DEGREES
T OFFSET: 416 MS
UFH PPP CHRO-ACNC: 0.5 IU/ML
UROBILINOGEN UR STRIP.AUTO-MCNC: ABNORMAL MG/DL
VENTRICULAR RATE: 81 BPM
WBC # BLD AUTO: 12.3 X10*3/UL (ref 4.4–11.3)
WBC #/AREA URNS AUTO: NORMAL /HPF

## 2024-08-08 PROCEDURE — 85610 PROTHROMBIN TIME: CPT

## 2024-08-08 PROCEDURE — 99233 SBSQ HOSP IP/OBS HIGH 50: CPT

## 2024-08-08 PROCEDURE — 2500000001 HC RX 250 WO HCPCS SELF ADMINISTERED DRUGS (ALT 637 FOR MEDICARE OP)

## 2024-08-08 PROCEDURE — 1200000002 HC GENERAL ROOM WITH TELEMETRY DAILY

## 2024-08-08 PROCEDURE — 85027 COMPLETE CBC AUTOMATED: CPT

## 2024-08-08 PROCEDURE — 2500000001 HC RX 250 WO HCPCS SELF ADMINISTERED DRUGS (ALT 637 FOR MEDICARE OP): Performed by: INTERNAL MEDICINE

## 2024-08-08 PROCEDURE — 85520 HEPARIN ASSAY: CPT

## 2024-08-08 PROCEDURE — 99232 SBSQ HOSP IP/OBS MODERATE 35: CPT | Performed by: NURSE PRACTITIONER

## 2024-08-08 PROCEDURE — 2500000004 HC RX 250 GENERAL PHARMACY W/ HCPCS (ALT 636 FOR OP/ED)

## 2024-08-08 PROCEDURE — 81001 URINALYSIS AUTO W/SCOPE: CPT

## 2024-08-08 PROCEDURE — 2500000002 HC RX 250 W HCPCS SELF ADMINISTERED DRUGS (ALT 637 FOR MEDICARE OP, ALT 636 FOR OP/ED)

## 2024-08-08 PROCEDURE — 36415 COLL VENOUS BLD VENIPUNCTURE: CPT | Performed by: STUDENT IN AN ORGANIZED HEALTH CARE EDUCATION/TRAINING PROGRAM

## 2024-08-08 PROCEDURE — 86901 BLOOD TYPING SEROLOGIC RH(D): CPT | Performed by: STUDENT IN AN ORGANIZED HEALTH CARE EDUCATION/TRAINING PROGRAM

## 2024-08-08 PROCEDURE — 36415 COLL VENOUS BLD VENIPUNCTURE: CPT

## 2024-08-08 PROCEDURE — 80069 RENAL FUNCTION PANEL: CPT

## 2024-08-08 PROCEDURE — 85610 PROTHROMBIN TIME: CPT | Performed by: STUDENT IN AN ORGANIZED HEALTH CARE EDUCATION/TRAINING PROGRAM

## 2024-08-08 PROCEDURE — 83735 ASSAY OF MAGNESIUM: CPT

## 2024-08-08 RX ORDER — SODIUM CHLORIDE, SODIUM LACTATE, POTASSIUM CHLORIDE, CALCIUM CHLORIDE 600; 310; 30; 20 MG/100ML; MG/100ML; MG/100ML; MG/100ML
150 INJECTION, SOLUTION INTRAVENOUS CONTINUOUS
Status: DISCONTINUED | OUTPATIENT
Start: 2024-08-08 | End: 2024-08-09

## 2024-08-08 RX ORDER — NAPROXEN 500 MG/1
500 TABLET ORAL
Status: DISCONTINUED | OUTPATIENT
Start: 2024-08-08 | End: 2024-08-09

## 2024-08-08 RX ORDER — MAGNESIUM SULFATE HEPTAHYDRATE 40 MG/ML
2 INJECTION, SOLUTION INTRAVENOUS ONCE
Status: COMPLETED | OUTPATIENT
Start: 2024-08-08 | End: 2024-08-08

## 2024-08-08 RX ORDER — POTASSIUM CHLORIDE 20 MEQ/1
40 TABLET, EXTENDED RELEASE ORAL ONCE
Status: COMPLETED | OUTPATIENT
Start: 2024-08-08 | End: 2024-08-08

## 2024-08-08 RX ORDER — HEPARIN SODIUM 10000 [USP'U]/100ML
0-4500 INJECTION, SOLUTION INTRAVENOUS CONTINUOUS
Status: DISCONTINUED | OUTPATIENT
Start: 2024-08-08 | End: 2024-08-09

## 2024-08-08 ASSESSMENT — COGNITIVE AND FUNCTIONAL STATUS - GENERAL
DRESSING REGULAR LOWER BODY CLOTHING: A LITTLE
DAILY ACTIVITIY SCORE: 23
DAILY ACTIVITIY SCORE: 23
CLIMB 3 TO 5 STEPS WITH RAILING: A LITTLE
MOBILITY SCORE: 23
DAILY ACTIVITIY SCORE: 23
MOBILITY SCORE: 23
DRESSING REGULAR LOWER BODY CLOTHING: A LITTLE
MOBILITY SCORE: 23
CLIMB 3 TO 5 STEPS WITH RAILING: A LITTLE
DRESSING REGULAR LOWER BODY CLOTHING: A LITTLE
CLIMB 3 TO 5 STEPS WITH RAILING: A LITTLE

## 2024-08-08 ASSESSMENT — PAIN SCALES - WONG BAKER: WONGBAKER_NUMERICALRESPONSE: NO HURT

## 2024-08-08 ASSESSMENT — PAIN SCALES - GENERAL
PAINLEVEL_OUTOF10: 0 - NO PAIN

## 2024-08-08 ASSESSMENT — PAIN - FUNCTIONAL ASSESSMENT: PAIN_FUNCTIONAL_ASSESSMENT: 0-10

## 2024-08-08 NOTE — PROGRESS NOTES
Transitional Care Coordination Progress Note:  Patient discussed during interdisciplinary rounds.  Team members present: PADMAJA EASLEY  Plan per Medical/Surgical team: Plan for OR today with Neuro-Surgery team for T8 transpedic decompression, T6-10 fusion.  Payer: [employer]  Status: Inpatient  Discharge disposition: PT/OT evals will be needed post OR for therapy recs. Anticipate pt will discharge to ARF/SNF when medically ready.  Potential Barriers: none  ADOD: 8/13  Care coordinator will continue to follow for discharge planning needs.     Ellie Us RN  Transitional Care Coordinator/TCC  [id number]

## 2024-08-08 NOTE — PROGRESS NOTES
"Kassandra Veras is a 58 y.o. female on hospital day 8 of admission presenting with Mass of upper lobe of left lung.    Subjective   NAEON. Reports decent pain control. To OR today with neurosurgery for decompression/fusion - she is aware of the plan and states she is comfortable moving forward with surgery.        Objective     Physical Exam  Vitals reviewed.   Constitutional:       General: She is awake. She is not in acute distress.     Appearance: Normal appearance.      Comments: Appears in mild pain    Eyes:      Extraocular Movements: Extraocular movements intact.   Cardiovascular:      Rate and Rhythm: Normal rate and regular rhythm.      Heart sounds: Normal heart sounds. No murmur heard.  Pulmonary:      Effort: Pulmonary effort is normal. No respiratory distress.      Breath sounds: Normal breath sounds.   Abdominal:      General: Abdomen is flat. Bowel sounds are normal.      Palpations: Abdomen is soft.      Tenderness: There is no abdominal tenderness.   Musculoskeletal:      Comments: Tenderness to palpation of the thoracic spine   Skin:     General: Skin is warm and dry.   Neurological:      Mental Status: She is alert. Mental status is at baseline.   Psychiatric:         Behavior: Behavior is cooperative.       Last Recorded Vitals  Blood pressure 109/64, pulse 81, temperature 36.7 °C (98.1 °F), resp. rate 20, height 1.676 m (5' 6\"), weight 74.4 kg (164 lb), SpO2 94%.  Intake/Output last 24h:    Intake/Output Summary (Last 24 hours) at 8/8/2024 1140  Last data filed at 8/8/2024 0200  Gross per 24 hour   Intake 500.1 ml   Output --   Net 500.1 ml       Relevant Results  Results from last 72 hours   Lab Units 08/08/24  0906 08/07/24  1802 08/07/24  0731 08/07/24  0730 08/06/24  0915   WBC AUTO x10*3/uL 12.3* 13.7* 15.3*  --   --    HEMOGLOBIN g/dL 7.8* 7.8* 7.9*  --   --    HEMATOCRIT % 24.9* 24.7* 26.7*  --   --    PLATELETS AUTO x10*3/uL 711* 657* 643*  --   --    INR  1.3* 1.5*  --   --   --    SODIUM " mmol/L 143 139  --  139 139   POTASSIUM mmol/L 3.7 3.4*  --  3.4* 3.5   CHLORIDE mmol/L 105 104  --  102 101   CO2 mmol/L 29 28  --  28 29   BUN mg/dL 10 11  --  11 8   CREATININE mg/dL 0.87 1.10*  --  0.95 0.70   GLUCOSE mg/dL 74 114*  --  78 91   CALCIUM mg/dL 8.3* 8.1*  --  7.8* 8.3*   MAGNESIUM mg/dL 1.86  --   --  1.83 1.85   PHOSPHORUS mg/dL 4.1 3.4  --  3.6 3.6   ALBUMIN g/dL 2.2* 2.3*  --  2.2* 2.4*     Scheduled medications  acetaminophen, 975 mg, oral, q8h  atorvastatin, 40 mg, oral, Daily  citalopram, 20 mg, oral, Daily  hydrOXYzine HCL, 25 mg, oral, Daily  oxyCODONE ER, 10 mg, oral, q12h PATRICE  pantoprazole, 40 mg, oral, Daily before breakfast  polyethylene glycol, 17 g, oral, BID  sennosides-docusate sodium, 2 tablet, oral, BID      Continuous medications     PRN medications  PRN medications: HYDROmorphone, oxyCODONE         Assessment/Plan   Ms Veras is a 58 year old F, w/ PMHx of HLD, HTN, depression, anxiety, presents to  ED at the advice of her physician due to US findings of IVC and right renal vein thrombus. Imaging findings consistent with significant metastatic disease identified in adrenal glands, spine, and left upper lobe lung, unclear origin, however high suspicion for primary lung cancer.      #Suspected stage IV Non-small cell lung carcinoma  #Bilateral adrenal masses  CTPE: Left upper lobe suprahilar mass with invasion into the mediastinum. Encasement of the left upper lobe pulmonary arterial branches. Enlarged subcarinal lymph node. 0.6 cm left upper lobe perifissural nodule and 0.6 cm right lower lobe pulmonary nodule.  -Cytology consistent with non-small cell lung carcinoma  Plan:  - Ongoing goals of care discussion with patient and her family - now DNR/DNI status  - Supportive oncology consulted - appreciate recommendations  - Oncology consulted - no formal recs until a final biopsy result is available, appreciate recommendations     #Right Iliac Osteolytic Lesions  #Ventral Epidural  Lesion   #Pathologic T8 Compression Fracture   - CT A/P: lytic destructive lesion with mild pathologic compression deformity of the T8 vertebral body without retropulsion. There is ventral epidural tumor involvement at T7 and T8 with at least moderate central canal stenosis at that level.  Plan:  -Neurosurgery consulted - to OR for decompression/fusion 8/8  -Oxycontin 10mg q12 hrs + Naproxen 500mg BID + PRN dilaudid 0.5 + oxycodone 5mg q4 for pain - see supportive onc pain recommendations  - Will update pain regimen per neurosurgery following OR     #Pre-operative risk stratification  - Patient is class I risk level per RCRI scoring.   - Patient is medically optimized at this time. No further intervention.      #Anemia   #Thrombocythemia   #Intrahepatic IVC Thrombus   -No evidence of overt GI bleed. Presentation likely iso of malignancy. Patient with no history of vascular disease or history of blood clots  Plan:  -Vascular medicine consulted 8/1, appreciate recommendations              - LE duplex to evaluate for DVTs negative  - Echo 8/3 with concern for IVC thrombus              - Continue heparin gtt for now   - Need to differentiate between bland tumor vs tumor + thrombus - ideally PET to evaluate, should be done outpatient  - Outpatient follow up scheduled 10/22 @ 2:30  - Holding Eliquis at this time due to upcoming surgery  - Will hold AC indefinitely following OR pending neurosurgery recommendations to restart heparin infusion   - Patient needs SCDs post-operatively  - Will monitor closely for signs of VTE: SOB, tachy, chest pain, pain/swelling of extremities        #HTN  #HLD   -Holding home HTN meds iso soft pressures since admission   -C/w Atorvastatin 40 mg once daily     #Depression   #Anxiety   -C/w home citalopram and hydroxyzine      F: PRN  E: PRN  N: regular  A: pIV  DVT: Heparin gtt  GI: Protonix     Bowel Regimen: Miralax + Doc-Senna  Code Status: DNR/DNI  NOK: Adan Veras (son): [phone]           Patient seen and staffed with attending physician. Recommendations not final until attested.   Marc Montez MD

## 2024-08-08 NOTE — PROGRESS NOTES
Recreation Therapy Note    Therapy Session  Visit Type: New visit  Session Start Time: 1435  Session End Time: 1445  Intervention Delivery: In-person  Conflict of Service: None  Number of family members present: 0  Family Present for Session: None  Family Participation: None  Number of staff members present: 1    Pre-assessment  Unable to Assess Reason: Outcomes not applicable  Mood/Affect: Appropriate, Calm  Verbalized Emotional State:  (none verbalized)    Treatment  Areas of Focus: Coping, Socialization, Self-expression, Normalization, Stress reduction  Co-Treatment:  (none)  Interruption: No  Patient Fell Asleep at End of Session: No    Post-assessment  Unable to Assess Reason: Outcomes not applicable  Mood/Affect: Appropriate, Cooperative  Verbalized Emotional State:  (none verbalized)  Continue Visiting: Yes  Total Session Time (min): 10 minutes    Narrative  Assessment Detail: Upon approach patient was agreeable to engage in a recreaitonal therapy session.  Plan: To encourage the exploration of safe and effective positive leisure coping skills to manage situational stressors.  Intervention: Patient chose to play cards.  Evaluation: Patient was pleasant and social throughout the time spent.  Initiated conversation.  Session ended early because patient was in pain stating that she was only able to sit up for a few minutes due to her back issues.  Follow-up: Will continue to encourage the exploration of positive leisure coping skills.  Patient Comments: See above.      History of Present Illness  Ms Veras is a 58 year old F, w/ PMHx of HLD, HTN, depression, anxiety, presents to  ED at the advice of her physician due to US findings of IVC and right renal vein thrombus.      At baseline patient without any functional deficits. She was in her usual state of health until March of this year, at which at that time reports the passing of her  due to colon cancer. She reports her depression worsening since and  not taking care of herself very well. Although patient has history of chronic back pain, she recalls developing sharp, stabbing upper back pain in early June that radiated bilaterally, she rated her pain 10/10 at that time. Her back pain progressively worsened, and she could no longer  her laundry basket. Furthermore she noticed around the same time some upper extremity weakness, and fatigue. Patient then presented to her PCP on july 25th due to worsening of her back pain, lumbar XR at the time showed T8 compression fracture and rounded mass-like opacity projecting in the left lower thorax. Following the thoracic XR finding, complete workup obtained, followed by BL Renal US given elevated Cr.

## 2024-08-08 NOTE — PROGRESS NOTES
Kassandra Veras is a 58 y.o. female on day 8 of admission presenting with IVC thrombus.   Medical history significant for HLD, HTN, daily tobacco use (1ppd x45 years).  Presented to [facility] for further evaluation of outpatient imaging significant for right upper pole renal mass 10 x 6 x 7 cm, likely renal neoplasm, with extension into the IVC with thrombus measuring 4.5 x 2.4 x 2.8 cm.   Further evaluation showed suspected stage IV non-small cell lung cancer, and she is s/p LN bx 8/2/24, as well as lytic destructive lesion with mild pathologic compression deformity T8 vertebral body with ventral epidural tumor involvement at T7-8.    NSGY consulted, and has proposed surgical resection for 8/8/24, but unsure at this time if patient is agreeable to surgical intervention.   Vascular Medicine Service consulted for anticoagulation recommendations for management of IVC thrombus.  Completed JENNA 8/3/24 that showed an obstructive echodensity in the IVC near the junction of the RA which may be tumor thrombus vs bland thrombus.  Plan for outpatient PET scan to delineate thrombus.   Underwent CTA chest that showed no evidence of PE.   Underwent US of BLE that showed no evidence of DVT of BLE.   Plan for T9 decompression and T6-10 fusion with NSGY Team today.   Heparin infusion has been held since midnight.     Subjective   Patient reports hunger and thirst due to NPO status for surgery.  Patient reports mild SOB with ambulation to the restroom, that resolves quickly with rest.  Denies CP or bleeding.      Objective   Heparin infusion has been held since midnight.     Physical Exam  Resting in bed in NAD  Respirations shallow but regular, breath sounds CTA all anterior lung fields; on RA  Normal heart sounds with regular rate/rhythm; telemetry monitor shows NSR without witnessed ectopy; vital signs are stable with soft BP 94/56  Extremities are warm to touch with palpable bilateral radial and DP pulses; no evidence of peripheral  "edema  Patient is awake and participates in conversation; reports that she is hopeful that surgery will help her back pain.     Last Recorded Vitals  Blood pressure 93/56, pulse 79, temperature 36.3 °C (97.3 °F), resp. rate 17, height 1.676 m (5' 6\"), weight 74.4 kg (164 lb), SpO2 93%.  Intake/Output last 3 Shifts:  I/O last 3 completed shifts:  In: 500.1 (6.7 mL/kg) [IV Piggyback:500.1]  Out: - (0 mL/kg)   Dosing Weight: 74.4 kg     Relevant Results  Scheduled medications  acetaminophen, 975 mg, oral, q8h  atorvastatin, 40 mg, oral, Daily  citalopram, 20 mg, oral, Daily  hydrOXYzine HCL, 25 mg, oral, Daily  oxyCODONE ER, 10 mg, oral, q12h PATRICE  pantoprazole, 40 mg, oral, Daily before breakfast  polyethylene glycol, 17 g, oral, BID  sennosides-docusate sodium, 2 tablet, oral, BID      Results from last 7 days   Lab Units 08/07/24 1802 08/07/24  0731 08/06/24  0914   WBC AUTO x10*3/uL 13.7* 15.3* 15.8*   HEMOGLOBIN g/dL 7.8* 7.9* 8.1*   HEMATOCRIT % 24.7* 26.7* 26.4*   PLATELETS AUTO x10*3/uL 657* 643* 709*       Results from last 7 days   Lab Units 08/07/24  1802 08/07/24  0730 08/06/24  0915   SODIUM mmol/L 139 139 139   POTASSIUM mmol/L 3.4* 3.4* 3.5   CHLORIDE mmol/L 104 102 101   CO2 mmol/L 28 28 29   BUN mg/dL 11 11 8   CREATININE mg/dL 1.10* 0.95 0.70   GLUCOSE mg/dL 114* 78 91   CALCIUM mg/dL 8.1* 7.8* 8.3*   Estimated Creatinine Clearance: 56.1 mL/min (A) (by C-G formula based on SCr of 1.1 mg/dL (H)).      Results from last 7 days   Lab Units 08/07/24  1802 08/04/24  0726 08/03/24  1702   APTT seconds 134* 51* 43*   INR  1.5*  --   --        Results from last 7 days   Lab Units 08/07/24  0730 08/06/24  2105 08/06/24  1643   ANTI XA UNFRACTIONATED IU/mL 0.6 0.4 0.5     CT ANGIO CHEST FOR PULMONARY EMBOLISM; 7/31/2024 8:36 pm   INDICATION:  Signs/Symptoms: new malignancy, tachy, sob.  IMPRESSION:  1. No evidence of acute pulmonary embolism.  2. Left upper lobe suprahilar mass with invasion into the " mediastinum and question of upper left pericardium involvement. Encasement of the left upper lobe pulmonary arterial branches.  3. Enlarged subcarinal lymph node.  4. Small left-sided pleural effusion.  5. 0.6 cm left upper lobe perifissural nodule and 0.6 cm right lower lobe pulmonary nodule. These are nonspecific.  6. Lytic destructive bone marrow lesion within the T8 vertebral body. Ventral epidural tumor at the level of T7-T8 better visualized on the concurrent CT of abdomen/pelvis.  7. Nonspecific subcutaneous 0.8 cm nodule in the left upper anterior chest wall.    [facility] US LOWER EXTREMITY VENOUS DUPLEX BILATERAL   Study Date: 8/6/2024   CONCLUSIONS:  Right Lower Venous: No evidence of acute deep vein thrombus visualized in the right lower extremity. Slow blood flow noted in common femoral, femoral and popliteal veins.  Left Lower Venous: No evidence of acute deep vein thrombus visualized in the left lower extremity. Slow blood flow noted in common femoral, femoral and popliteal veins.          Assessment/Plan   Principal Problem:    Mass of upper lobe of left lung  Active Problems:    Hypokalemia    HTN (hypertension)    Malignancy (Multi)    58 year old female with medical history as described above.   Presented to [facility] for further evaluation of findings of right renal mass with extension into the IVC.   Further hospital workup shows suspected stage IV non-small cell lung cancer, and she is s/p LN bx 8/2/24, as well as lytic lesions with pathologic compression of T7-8.    NSGY consulted, and patient is scheduled for thoracic decompression and fusion today.   Vascular Medicine Service following for anticoagulation recommendations for management of IVC thrombus.  Heparin infusion held since midnight in anticipation of surgical intervention today. .   Anticardiolipin Ab and Beta-2 Glycoprotein Ab results returned as negative.   Discussion with patient: We will follow NSGY recommendations regarding initiation of  anticoagulation therapy.  When patient is closer to time of hospital discharge we will transition to outpatient anticoagulant.       Recommendations:  ~FOLLOW NSGY recommendations regarding restarting Heparin infusion in the postoperative setting.   ~If unable to restart Heparin infusion after surgery, consider Heparin 5K q8 hours for DVT chemoprophylaxis  ~Patient to wear SCDs to BLE at all times after surgery  ~MONITOR for bleeding  ~Follow up Lupus Anticoagulant results  ~Monitor closely for signs of VTE in the postoperative setting: SOB, tachycardia, chest pain, pain/swelling of extremities etc.   ~Will need PET scan completed as outpatient to determine tumor thrombus vs bland thrombus in the IVC/RA  ~Outpatient follow up with Dr. Lisset White has been scheduled for 10/22 @ 2:30 [place] [address]     Scheduling line is [phone].  ~Tobacco cessation.      Plan of care discussed with Dr. Lisset White  Plan of care discussed via EPIC [place] with Dr. Marc Montez from the [place] [facility] Team        Nicole Mata, FLORENTINO-CNP  Vascular Medicine Service   Pager [phone]

## 2024-08-08 NOTE — PROGRESS NOTES
Occupational Therapy                 Therapy Communication Note    Patient Name: Kassandra Veras  MRN: [id number]  Today's Date: 8/8/2024     Discipline: Occupational Therapy    Missed Visit Reason: Missed Visit Reason:  (Pt going to OR this date, will follow up post-op)    Missed Time: Attempt

## 2024-08-08 NOTE — PROGRESS NOTES
Physical Therapy                 Therapy Communication Note    Patient Name: Kassandra Veras  MRN: [id number]  Today's Date: 8/8/2024     Discipline: Physical Therapy    Missed Visit Reason: Missed Visit Reason: Other (Comment)    Missed Time: Attempt    Comment:  OR 8/8 today for T8 transpedic decompression, T6-10 fusion .. Will re-attempt following OR

## 2024-08-08 NOTE — PROGRESS NOTES
Subjective   Kassandra Masker is a 58 y.o. female on day 8 of admission   No acute events overnight    Objective   Physical Exam  AOx3  RUE D5 B5 T4+ HG/IO 4+  RLE HF4+ KE5 DF/PF 5  LUE D5 B5 T5 HG/IO 5  LLE HF4+ KE5 DF/PF 5  SILT    Assessment/Plan   Kassandra is a 58 y.o. female with h/o HTN, HLD, depression, anxiety p/w hypotension and tachycardia, RUQ US IVC thrombus, R renal mass, CT PE L perihilar mass encasing L pulmonary artery, CT AP b/l adrenal masses, R adrenal mass w/ extension into infrahepatic IVC, T8 compression fracture w 50% height loss, ventral epidural lesion, R iliac osteolytic lesion, MRI brain neg, MRI C/L neg, MRI T spine T8 lesion w sig epidural extension, 8/2 s/p EBUS, 8/3 TTE 60-65% EF    PLAN  DNR/DNI  Garcia primary  OR 8/8 today for T8 transpedic decompression, T6-10 fusion  Final path  Supportive onc recs  Endo recs  Vasc med recs    I have referenced the HP dated 8/1 and any changes are noted in progress note.      Carole Lockwood MD

## 2024-08-09 ENCOUNTER — APPOINTMENT (OUTPATIENT)
Dept: RADIOLOGY | Facility: HOSPITAL | Age: 59
End: 2024-08-09
Payer: COMMERCIAL

## 2024-08-09 ENCOUNTER — HOSPITAL ENCOUNTER (INPATIENT)
Dept: NEUROLOGY | Facility: HOSPITAL | Age: 59
Discharge: HOME | End: 2024-08-09
Payer: COMMERCIAL

## 2024-08-09 LAB
ALBUMIN SERPL BCP-MCNC: 2 G/DL (ref 3.4–5)
ANION GAP BLDA CALCULATED.4IONS-SCNC: 11 MMO/L (ref 10–25)
ANION GAP BLDA CALCULATED.4IONS-SCNC: 9 MMO/L (ref 10–25)
ANION GAP SERPL CALC-SCNC: 11 MMOL/L (ref 10–20)
BASE EXCESS BLDA CALC-SCNC: -0.4 MMOL/L (ref -2–3)
BASE EXCESS BLDA CALC-SCNC: -3.9 MMOL/L (ref -2–3)
BASOPHILS # BLD AUTO: 0.15 X10*3/UL (ref 0–0.1)
BASOPHILS NFR BLD AUTO: 0.9 %
BLOOD EXPIRATION DATE: NORMAL
BLOOD EXPIRATION DATE: NORMAL
BODY TEMPERATURE: 37 DEGREES CELSIUS
BODY TEMPERATURE: 37 DEGREES CELSIUS
BUN SERPL-MCNC: 11 MG/DL (ref 6–23)
CA-I BLDA-SCNC: 0.99 MMOL/L (ref 1.1–1.33)
CA-I BLDA-SCNC: 1.1 MMOL/L (ref 1.1–1.33)
CALCIUM SERPL-MCNC: 7.4 MG/DL (ref 8.6–10.6)
CHLORIDE BLDA-SCNC: 104 MMOL/L (ref 98–107)
CHLORIDE BLDA-SCNC: 106 MMOL/L (ref 98–107)
CHLORIDE SERPL-SCNC: 105 MMOL/L (ref 98–107)
CO2 SERPL-SCNC: 27 MMOL/L (ref 21–32)
CREAT SERPL-MCNC: 0.91 MG/DL (ref 0.5–1.05)
DISPENSE STATUS: NORMAL
DISPENSE STATUS: NORMAL
EGFRCR SERPLBLD CKD-EPI 2021: 72 ML/MIN/1.73M*2
EOSINOPHIL # BLD AUTO: 0.16 X10*3/UL (ref 0–0.7)
EOSINOPHIL NFR BLD AUTO: 1 %
ERYTHROCYTE [DISTWIDTH] IN BLOOD BY AUTOMATED COUNT: 15.9 % (ref 11.5–14.5)
GLUCOSE BLDA-MCNC: 106 MG/DL (ref 74–99)
GLUCOSE BLDA-MCNC: 113 MG/DL (ref 74–99)
GLUCOSE SERPL-MCNC: 97 MG/DL (ref 74–99)
HCO3 BLDA-SCNC: 22.6 MMOL/L (ref 22–26)
HCO3 BLDA-SCNC: 24.9 MMOL/L (ref 22–26)
HCT VFR BLD AUTO: 26.8 % (ref 36–46)
HCT VFR BLD EST: 19 % (ref 36–46)
HCT VFR BLD EST: 38 % (ref 36–46)
HGB BLD-MCNC: 8.9 G/DL (ref 12–16)
HGB BLDA-MCNC: 12.5 G/DL (ref 12–16)
HGB BLDA-MCNC: 6.4 G/DL (ref 12–16)
HOLD SPECIMEN: NORMAL
IMM GRANULOCYTES # BLD AUTO: 0.56 X10*3/UL (ref 0–0.7)
IMM GRANULOCYTES NFR BLD AUTO: 3.4 % (ref 0–0.9)
INHALED O2 CONCENTRATION: 100 %
INHALED O2 CONCENTRATION: 70 %
LAB AP ASR DISCLAIMER: NORMAL
LABORATORY COMMENT REPORT: NORMAL
LACTATE BLDA-SCNC: 1.3 MMOL/L (ref 0.4–2)
LACTATE BLDA-SCNC: 1.4 MMOL/L (ref 0.4–2)
LYMPHOCYTES # BLD AUTO: 2.11 X10*3/UL (ref 1.2–4.8)
LYMPHOCYTES NFR BLD AUTO: 12.8 %
MCH RBC QN AUTO: 27.7 PG (ref 26–34)
MCHC RBC AUTO-ENTMCNC: 33.2 G/DL (ref 32–36)
MCV RBC AUTO: 84 FL (ref 80–100)
MONOCYTES # BLD AUTO: 1.23 X10*3/UL (ref 0.1–1)
MONOCYTES NFR BLD AUTO: 7.4 %
NEUTROPHILS # BLD AUTO: 12.32 X10*3/UL (ref 1.2–7.7)
NEUTROPHILS NFR BLD AUTO: 74.5 %
NRBC BLD-RTO: 0.1 /100 WBCS (ref 0–0)
OXYHGB MFR BLDA: 96.9 % (ref 94–98)
OXYHGB MFR BLDA: 97.2 % (ref 94–98)
PATH REPORT.ADDENDUM SPEC: NORMAL
PATH REPORT.FINAL DX SPEC: NORMAL
PATH REPORT.GROSS SPEC: NORMAL
PATH REPORT.TOTAL CANCER: NORMAL
PCO2 BLDA: 43 MM HG (ref 38–42)
PCO2 BLDA: 46 MM HG (ref 38–42)
PH BLDA: 7.3 PH (ref 7.38–7.42)
PH BLDA: 7.37 PH (ref 7.38–7.42)
PHOSPHATE SERPL-MCNC: 4.7 MG/DL (ref 2.5–4.9)
PLATELET # BLD AUTO: 493 X10*3/UL (ref 150–450)
PO2 BLDA: 271 MM HG (ref 85–95)
PO2 BLDA: 324 MM HG (ref 85–95)
POTASSIUM BLDA-SCNC: 3.6 MMOL/L (ref 3.5–5.3)
POTASSIUM BLDA-SCNC: 4.1 MMOL/L (ref 3.5–5.3)
POTASSIUM SERPL-SCNC: 4 MMOL/L (ref 3.5–5.3)
PRODUCT BLOOD TYPE: 600
PRODUCT BLOOD TYPE: 600
PRODUCT CODE: NORMAL
PRODUCT CODE: NORMAL
RBC # BLD AUTO: 3.21 X10*6/UL (ref 4–5.2)
RESIDENT REVIEW: NORMAL
SAO2 % BLDA: 100 % (ref 94–100)
SAO2 % BLDA: 100 % (ref 94–100)
SODIUM BLDA-SCNC: 134 MMOL/L (ref 136–145)
SODIUM BLDA-SCNC: 136 MMOL/L (ref 136–145)
SODIUM SERPL-SCNC: 139 MMOL/L (ref 136–145)
UNIT ABO: NORMAL
UNIT ABO: NORMAL
UNIT NUMBER: NORMAL
UNIT NUMBER: NORMAL
UNIT RH: NORMAL
UNIT RH: NORMAL
UNIT VOLUME: 283
UNIT VOLUME: 350
WBC # BLD AUTO: 16.5 X10*3/UL (ref 4.4–11.3)
XM INTEP: NORMAL
XM INTEP: NORMAL

## 2024-08-09 PROCEDURE — 61783 SCAN PROC SPINAL: CPT | Performed by: NEUROLOGICAL SURGERY

## 2024-08-09 PROCEDURE — 2500000004 HC RX 250 GENERAL PHARMACY W/ HCPCS (ALT 636 FOR OP/ED)

## 2024-08-09 PROCEDURE — 01N80ZZ RELEASE THORACIC NERVE, OPEN APPROACH: ICD-10-PCS | Performed by: NEUROLOGICAL SURGERY

## 2024-08-09 PROCEDURE — 71045 X-RAY EXAM CHEST 1 VIEW: CPT

## 2024-08-09 PROCEDURE — C1713 ANCHOR/SCREW BN/BN,TIS/BN: HCPCS | Performed by: NEUROLOGICAL SURGERY

## 2024-08-09 PROCEDURE — 7100000001 HC RECOVERY ROOM TIME - INITIAL BASE CHARGE: Performed by: NEUROLOGICAL SURGERY

## 2024-08-09 PROCEDURE — 00BX0ZZ EXCISION OF THORACIC SPINAL CORD, OPEN APPROACH: ICD-10-PCS | Performed by: NEUROLOGICAL SURGERY

## 2024-08-09 PROCEDURE — 2500000004 HC RX 250 GENERAL PHARMACY W/ HCPCS (ALT 636 FOR OP/ED): Performed by: STUDENT IN AN ORGANIZED HEALTH CARE EDUCATION/TRAINING PROGRAM

## 2024-08-09 PROCEDURE — 3600000003 HC OR TIME - INITIAL BASE CHARGE - PROCEDURE LEVEL THREE: Performed by: NEUROLOGICAL SURGERY

## 2024-08-09 PROCEDURE — 22614 ARTHRD PST TQ 1NTRSPC EA ADD: CPT | Performed by: NEUROLOGICAL SURGERY

## 2024-08-09 PROCEDURE — 2780000003 HC OR 278 NO HCPCS: Performed by: NEUROLOGICAL SURGERY

## 2024-08-09 PROCEDURE — 84132 ASSAY OF SERUM POTASSIUM: CPT

## 2024-08-09 PROCEDURE — 2500000005 HC RX 250 GENERAL PHARMACY W/O HCPCS: Performed by: NEUROLOGICAL SURGERY

## 2024-08-09 PROCEDURE — 63055 DECOMPRESS SPINAL CORD THRC: CPT | Performed by: NEUROLOGICAL SURGERY

## 2024-08-09 PROCEDURE — 20930 SP BONE ALGRFT MORSEL ADD-ON: CPT | Performed by: NEUROLOGICAL SURGERY

## 2024-08-09 PROCEDURE — 63276 BX/EXC XDRL SPINE LESN THRC: CPT | Performed by: NEUROLOGICAL SURGERY

## 2024-08-09 PROCEDURE — 2500000005 HC RX 250 GENERAL PHARMACY W/O HCPCS

## 2024-08-09 PROCEDURE — 2720000007 HC OR 272 NO HCPCS: Performed by: NEUROLOGICAL SURGERY

## 2024-08-09 PROCEDURE — 7100000002 HC RECOVERY ROOM TIME - EACH INCREMENTAL 1 MINUTE: Performed by: NEUROLOGICAL SURGERY

## 2024-08-09 PROCEDURE — 3700000002 HC GENERAL ANESTHESIA TIME - EACH INCREMENTAL 1 MINUTE: Performed by: NEUROLOGICAL SURGERY

## 2024-08-09 PROCEDURE — 2500000005 HC RX 250 GENERAL PHARMACY W/O HCPCS: Performed by: STUDENT IN AN ORGANIZED HEALTH CARE EDUCATION/TRAINING PROGRAM

## 2024-08-09 PROCEDURE — 22842 INSERT SPINE FIXATION DEVICE: CPT | Performed by: NEUROLOGICAL SURGERY

## 2024-08-09 PROCEDURE — 37799 UNLISTED PX VASCULAR SURGERY: CPT | Performed by: STUDENT IN AN ORGANIZED HEALTH CARE EDUCATION/TRAINING PROGRAM

## 2024-08-09 PROCEDURE — 85025 COMPLETE CBC W/AUTO DIFF WBC: CPT | Performed by: STUDENT IN AN ORGANIZED HEALTH CARE EDUCATION/TRAINING PROGRAM

## 2024-08-09 PROCEDURE — P9016 RBC LEUKOCYTES REDUCED: HCPCS

## 2024-08-09 PROCEDURE — 36430 TRANSFUSION BLD/BLD COMPNT: CPT

## 2024-08-09 PROCEDURE — 1200000002 HC GENERAL ROOM WITH TELEMETRY DAILY

## 2024-08-09 PROCEDURE — 3700000001 HC GENERAL ANESTHESIA TIME - INITIAL BASE CHARGE: Performed by: NEUROLOGICAL SURGERY

## 2024-08-09 PROCEDURE — 3600000008 HC OR TIME - EACH INCREMENTAL 1 MINUTE - PROCEDURE LEVEL THREE: Performed by: NEUROLOGICAL SURGERY

## 2024-08-09 PROCEDURE — 22610 ARTHRD PST TQ 1NTRSPC THRC: CPT | Performed by: NEUROLOGICAL SURGERY

## 2024-08-09 PROCEDURE — 84132 ASSAY OF SERUM POTASSIUM: CPT | Performed by: STUDENT IN AN ORGANIZED HEALTH CARE EDUCATION/TRAINING PROGRAM

## 2024-08-09 PROCEDURE — 2500000001 HC RX 250 WO HCPCS SELF ADMINISTERED DRUGS (ALT 637 FOR MEDICARE OP): Performed by: STUDENT IN AN ORGANIZED HEALTH CARE EDUCATION/TRAINING PROGRAM

## 2024-08-09 PROCEDURE — 71045 X-RAY EXAM CHEST 1 VIEW: CPT | Performed by: RADIOLOGY

## 2024-08-09 PROCEDURE — 0RG7071 FUSION OF 2 TO 7 THORACIC VERTEBRAL JOINTS WITH AUTOLOGOUS TISSUE SUBSTITUTE, POSTERIOR APPROACH, POSTERIOR COLUMN, OPEN APPROACH: ICD-10-PCS | Performed by: NEUROLOGICAL SURGERY

## 2024-08-09 PROCEDURE — 88307 TISSUE EXAM BY PATHOLOGIST: CPT | Mod: TC,SUR | Performed by: INTERNAL MEDICINE

## 2024-08-09 DEVICE — ROD, RELINE-0, 5.5 X 300MM, STRAIGHT: Type: IMPLANTABLE DEVICE | Site: SPINE LUMBAR | Status: FUNCTIONAL

## 2024-08-09 DEVICE — SCREW, RELINE LOCK, 5.5MM OPEN TULIP: Type: IMPLANTABLE DEVICE | Site: SPINE LUMBAR | Status: FUNCTIONAL

## 2024-08-09 DEVICE — SCREW, RELINE-O, 6.5X45MM 2S POLYAXIAL: Type: IMPLANTABLE DEVICE | Site: SPINE LUMBAR | Status: FUNCTIONAL

## 2024-08-09 DEVICE — COMPONENT, SIGNIFY, BIOACTIVE CRUNCH, 10CC: Type: IMPLANTABLE DEVICE | Site: SPINE LUMBAR | Status: FUNCTIONAL

## 2024-08-09 RX ORDER — FENTANYL CITRATE 50 UG/ML
INJECTION, SOLUTION INTRAMUSCULAR; INTRAVENOUS AS NEEDED
Status: DISCONTINUED | OUTPATIENT
Start: 2024-08-09 | End: 2024-08-09

## 2024-08-09 RX ORDER — VANCOMYCIN HYDROCHLORIDE 1 G/20ML
INJECTION, POWDER, LYOPHILIZED, FOR SOLUTION INTRAVENOUS AS NEEDED
Status: DISCONTINUED | OUTPATIENT
Start: 2024-08-09 | End: 2024-08-09

## 2024-08-09 RX ORDER — LIDOCAINE HCL/PF 100 MG/5ML
SYRINGE (ML) INTRAVENOUS AS NEEDED
Status: DISCONTINUED | OUTPATIENT
Start: 2024-08-09 | End: 2024-08-09

## 2024-08-09 RX ORDER — SODIUM CHLORIDE, SODIUM LACTATE, POTASSIUM CHLORIDE, CALCIUM CHLORIDE 600; 310; 30; 20 MG/100ML; MG/100ML; MG/100ML; MG/100ML
INJECTION, SOLUTION INTRAVENOUS CONTINUOUS PRN
Status: DISCONTINUED | OUTPATIENT
Start: 2024-08-09 | End: 2024-08-09

## 2024-08-09 RX ORDER — BISACODYL 5 MG
10 TABLET, DELAYED RELEASE (ENTERIC COATED) ORAL DAILY PRN
Status: DISCONTINUED | OUTPATIENT
Start: 2024-08-09 | End: 2024-08-16 | Stop reason: HOSPADM

## 2024-08-09 RX ORDER — CYCLOBENZAPRINE HCL 10 MG
5 TABLET ORAL 3 TIMES DAILY
Status: DISCONTINUED | OUTPATIENT
Start: 2024-08-09 | End: 2024-08-16 | Stop reason: HOSPADM

## 2024-08-09 RX ORDER — PROPOFOL 10 MG/ML
INJECTION, EMULSION INTRAVENOUS AS NEEDED
Status: DISCONTINUED | OUTPATIENT
Start: 2024-08-09 | End: 2024-08-09

## 2024-08-09 RX ORDER — PHENYLEPHRINE 10 MG/250 ML(40 MCG/ML)IN 0.9 % SOD.CHLORIDE INTRAVENOUS
CONTINUOUS PRN
Status: DISCONTINUED | OUTPATIENT
Start: 2024-08-09 | End: 2024-08-09

## 2024-08-09 RX ORDER — NALOXONE HYDROCHLORIDE 0.4 MG/ML
0.2 INJECTION, SOLUTION INTRAMUSCULAR; INTRAVENOUS; SUBCUTANEOUS EVERY 5 MIN PRN
Status: DISCONTINUED | OUTPATIENT
Start: 2024-08-09 | End: 2024-08-16 | Stop reason: HOSPADM

## 2024-08-09 RX ORDER — SODIUM CHLORIDE, SODIUM LACTATE, POTASSIUM CHLORIDE, CALCIUM CHLORIDE 600; 310; 30; 20 MG/100ML; MG/100ML; MG/100ML; MG/100ML
100 INJECTION, SOLUTION INTRAVENOUS CONTINUOUS
Status: ACTIVE | OUTPATIENT
Start: 2024-08-09 | End: 2024-08-10

## 2024-08-09 RX ORDER — HYDROMORPHONE HYDROCHLORIDE 1 MG/ML
0.2 INJECTION, SOLUTION INTRAMUSCULAR; INTRAVENOUS; SUBCUTANEOUS EVERY 5 MIN PRN
Status: DISCONTINUED | OUTPATIENT
Start: 2024-08-09 | End: 2024-08-09 | Stop reason: HOSPADM

## 2024-08-09 RX ORDER — PHENYLEPHRINE HCL IN 0.9% NACL 0.4MG/10ML
SYRINGE (ML) INTRAVENOUS AS NEEDED
Status: DISCONTINUED | OUTPATIENT
Start: 2024-08-09 | End: 2024-08-09

## 2024-08-09 RX ORDER — HYDROMORPHONE HYDROCHLORIDE 1 MG/ML
0.2 INJECTION, SOLUTION INTRAMUSCULAR; INTRAVENOUS; SUBCUTANEOUS EVERY 5 MIN PRN
Status: DISCONTINUED | OUTPATIENT
Start: 2024-08-09 | End: 2024-08-09

## 2024-08-09 RX ORDER — DROPERIDOL 2.5 MG/ML
0.62 INJECTION, SOLUTION INTRAMUSCULAR; INTRAVENOUS ONCE AS NEEDED
Status: DISCONTINUED | OUTPATIENT
Start: 2024-08-09 | End: 2024-08-09 | Stop reason: HOSPADM

## 2024-08-09 RX ORDER — LIDOCAINE HYDROCHLORIDE AND EPINEPHRINE 5; 5 MG/ML; UG/ML
INJECTION, SOLUTION INFILTRATION; PERINEURAL AS NEEDED
Status: DISCONTINUED | OUTPATIENT
Start: 2024-08-09 | End: 2024-08-09 | Stop reason: HOSPADM

## 2024-08-09 RX ORDER — POLYETHYLENE GLYCOL 3350 17 G/17G
17 POWDER, FOR SOLUTION ORAL 2 TIMES DAILY
Status: DISCONTINUED | OUTPATIENT
Start: 2024-08-09 | End: 2024-08-16 | Stop reason: HOSPADM

## 2024-08-09 RX ORDER — POLYMYXIN B 500000 [USP'U]/1
INJECTION, POWDER, LYOPHILIZED, FOR SOLUTION INTRAMUSCULAR; INTRATHECAL; INTRAVENOUS; OPHTHALMIC AS NEEDED
Status: DISCONTINUED | OUTPATIENT
Start: 2024-08-09 | End: 2024-08-09 | Stop reason: HOSPADM

## 2024-08-09 RX ORDER — SODIUM CHLORIDE, SODIUM LACTATE, POTASSIUM CHLORIDE, CALCIUM CHLORIDE 600; 310; 30; 20 MG/100ML; MG/100ML; MG/100ML; MG/100ML
100 INJECTION, SOLUTION INTRAVENOUS CONTINUOUS
Status: DISCONTINUED | OUTPATIENT
Start: 2024-08-09 | End: 2024-08-09 | Stop reason: HOSPADM

## 2024-08-09 RX ORDER — DEXTROSE 50 % IN WATER (D50W) INTRAVENOUS SYRINGE
25
Status: DISCONTINUED | OUTPATIENT
Start: 2024-08-09 | End: 2024-08-16 | Stop reason: HOSPADM

## 2024-08-09 RX ORDER — OXYCODONE HYDROCHLORIDE 5 MG/1
10 TABLET ORAL EVERY 4 HOURS PRN
Status: DISCONTINUED | OUTPATIENT
Start: 2024-08-09 | End: 2024-08-16 | Stop reason: HOSPADM

## 2024-08-09 RX ORDER — CEFAZOLIN 1 G/1
INJECTION, POWDER, FOR SOLUTION INTRAVENOUS AS NEEDED
Status: DISCONTINUED | OUTPATIENT
Start: 2024-08-09 | End: 2024-08-09

## 2024-08-09 RX ORDER — ONDANSETRON 4 MG/1
4 TABLET, FILM COATED ORAL EVERY 8 HOURS PRN
Status: DISCONTINUED | OUTPATIENT
Start: 2024-08-09 | End: 2024-08-16 | Stop reason: HOSPADM

## 2024-08-09 RX ORDER — LIDOCAINE 560 MG/1
1 PATCH PERCUTANEOUS; TOPICAL; TRANSDERMAL DAILY
Status: DISCONTINUED | OUTPATIENT
Start: 2024-08-09 | End: 2024-08-16 | Stop reason: HOSPADM

## 2024-08-09 RX ORDER — HYDROMORPHONE HYDROCHLORIDE 1 MG/ML
0.3 INJECTION, SOLUTION INTRAMUSCULAR; INTRAVENOUS; SUBCUTANEOUS EVERY 5 MIN PRN
Status: DISCONTINUED | OUTPATIENT
Start: 2024-08-09 | End: 2024-08-09

## 2024-08-09 RX ORDER — ONDANSETRON HYDROCHLORIDE 2 MG/ML
INJECTION, SOLUTION INTRAVENOUS AS NEEDED
Status: DISCONTINUED | OUTPATIENT
Start: 2024-08-09 | End: 2024-08-09

## 2024-08-09 RX ORDER — DEXTROSE 50 % IN WATER (D50W) INTRAVENOUS SYRINGE
12.5
Status: DISCONTINUED | OUTPATIENT
Start: 2024-08-09 | End: 2024-08-16 | Stop reason: HOSPADM

## 2024-08-09 RX ORDER — KETOROLAC TROMETHAMINE 30 MG/ML
30 INJECTION, SOLUTION INTRAMUSCULAR; INTRAVENOUS EVERY 6 HOURS SCHEDULED
Status: COMPLETED | OUTPATIENT
Start: 2024-08-10 | End: 2024-08-11

## 2024-08-09 RX ORDER — HYDROMORPHONE HYDROCHLORIDE 1 MG/ML
0.1 INJECTION, SOLUTION INTRAMUSCULAR; INTRAVENOUS; SUBCUTANEOUS EVERY 5 MIN PRN
Status: DISCONTINUED | OUTPATIENT
Start: 2024-08-09 | End: 2024-08-09

## 2024-08-09 RX ORDER — ONDANSETRON HYDROCHLORIDE 2 MG/ML
4 INJECTION, SOLUTION INTRAVENOUS ONCE AS NEEDED
Status: DISCONTINUED | OUTPATIENT
Start: 2024-08-09 | End: 2024-08-09 | Stop reason: HOSPADM

## 2024-08-09 RX ORDER — DROPERIDOL 2.5 MG/ML
0.62 INJECTION, SOLUTION INTRAMUSCULAR; INTRAVENOUS ONCE AS NEEDED
Status: DISCONTINUED | OUTPATIENT
Start: 2024-08-09 | End: 2024-08-09

## 2024-08-09 RX ORDER — HYDROMORPHONE HYDROCHLORIDE 1 MG/ML
0.3 INJECTION, SOLUTION INTRAMUSCULAR; INTRAVENOUS; SUBCUTANEOUS EVERY 5 MIN PRN
Status: DISCONTINUED | OUTPATIENT
Start: 2024-08-09 | End: 2024-08-09 | Stop reason: HOSPADM

## 2024-08-09 RX ORDER — MIDAZOLAM HYDROCHLORIDE 1 MG/ML
INJECTION INTRAMUSCULAR; INTRAVENOUS AS NEEDED
Status: DISCONTINUED | OUTPATIENT
Start: 2024-08-09 | End: 2024-08-09

## 2024-08-09 RX ORDER — ROCURONIUM BROMIDE 10 MG/ML
INJECTION, SOLUTION INTRAVENOUS AS NEEDED
Status: DISCONTINUED | OUTPATIENT
Start: 2024-08-09 | End: 2024-08-09

## 2024-08-09 RX ORDER — HYDROMORPHONE HYDROCHLORIDE 1 MG/ML
INJECTION, SOLUTION INTRAMUSCULAR; INTRAVENOUS; SUBCUTANEOUS AS NEEDED
Status: DISCONTINUED | OUTPATIENT
Start: 2024-08-09 | End: 2024-08-09

## 2024-08-09 RX ORDER — HYDROMORPHONE HYDROCHLORIDE 1 MG/ML
0.1 INJECTION, SOLUTION INTRAMUSCULAR; INTRAVENOUS; SUBCUTANEOUS EVERY 5 MIN PRN
Status: DISCONTINUED | OUTPATIENT
Start: 2024-08-09 | End: 2024-08-09 | Stop reason: HOSPADM

## 2024-08-09 RX ORDER — HYDROMORPHONE HYDROCHLORIDE 1 MG/ML
0.2 INJECTION, SOLUTION INTRAMUSCULAR; INTRAVENOUS; SUBCUTANEOUS EVERY 4 HOURS PRN
Status: DISCONTINUED | OUTPATIENT
Start: 2024-08-09 | End: 2024-08-10

## 2024-08-09 RX ORDER — ACETAMINOPHEN 325 MG/1
650 TABLET ORAL EVERY 6 HOURS
Status: DISCONTINUED | OUTPATIENT
Start: 2024-08-09 | End: 2024-08-16 | Stop reason: HOSPADM

## 2024-08-09 RX ORDER — ONDANSETRON HYDROCHLORIDE 2 MG/ML
4 INJECTION, SOLUTION INTRAVENOUS ONCE AS NEEDED
Status: DISCONTINUED | OUTPATIENT
Start: 2024-08-09 | End: 2024-08-09

## 2024-08-09 RX ORDER — ONDANSETRON HYDROCHLORIDE 2 MG/ML
4 INJECTION, SOLUTION INTRAVENOUS EVERY 8 HOURS PRN
Status: DISCONTINUED | OUTPATIENT
Start: 2024-08-09 | End: 2024-08-16 | Stop reason: HOSPADM

## 2024-08-09 RX ORDER — OXYCODONE HYDROCHLORIDE 5 MG/1
5 TABLET ORAL EVERY 4 HOURS PRN
Status: DISCONTINUED | OUTPATIENT
Start: 2024-08-09 | End: 2024-08-10

## 2024-08-09 RX ORDER — OXYCODONE HYDROCHLORIDE 5 MG/1
2.5 TABLET ORAL EVERY 4 HOURS PRN
Status: DISCONTINUED | OUTPATIENT
Start: 2024-08-09 | End: 2024-08-10

## 2024-08-09 ASSESSMENT — PAIN SCALES - GENERAL
PAINLEVEL_OUTOF10: 10 - WORST POSSIBLE PAIN
PAINLEVEL_OUTOF10: 3
PAINLEVEL_OUTOF10: 2
PAINLEVEL_OUTOF10: 7
PAINLEVEL_OUTOF10: 8
PAINLEVEL_OUTOF10: 8
PAINLEVEL_OUTOF10: 10 - WORST POSSIBLE PAIN
PAINLEVEL_OUTOF10: 4
PAINLEVEL_OUTOF10: 3
PAINLEVEL_OUTOF10: 3
PAINLEVEL_OUTOF10: 5 - MODERATE PAIN
PAINLEVEL_OUTOF10: 3
PAINLEVEL_OUTOF10: 8
PAINLEVEL_OUTOF10: 3

## 2024-08-09 ASSESSMENT — COGNITIVE AND FUNCTIONAL STATUS - GENERAL
DAILY ACTIVITIY SCORE: 23
DRESSING REGULAR LOWER BODY CLOTHING: A LITTLE
CLIMB 3 TO 5 STEPS WITH RAILING: A LITTLE
STANDING UP FROM CHAIR USING ARMS: A LITTLE
MOVING TO AND FROM BED TO CHAIR: A LITTLE
DAILY ACTIVITIY SCORE: 23
WALKING IN HOSPITAL ROOM: A LITTLE
MOBILITY SCORE: 19
STANDING UP FROM CHAIR USING ARMS: A LITTLE
MOBILITY SCORE: 20
CLIMB 3 TO 5 STEPS WITH RAILING: A LOT
WALKING IN HOSPITAL ROOM: A LITTLE
MOVING TO AND FROM BED TO CHAIR: A LITTLE
DRESSING REGULAR LOWER BODY CLOTHING: A LITTLE

## 2024-08-09 NOTE — PROGRESS NOTES
"Kassandra Veras is a 58 y.o. female on hospital day 9 of admission presenting with Mass of upper lobe of left lung.    Subjective   PATIENT NOT SEEN DUE TO BEING IN OR THROUGHOUT DAY.         Objective     Physical exam: Not complete due to patient in OR/PACU throughout the day.     Last Recorded Vitals  Blood pressure 129/72, pulse 71, temperature 36.2 °C (97.2 °F), temperature source Axillary, resp. rate 12, height 1.676 m (5' 6\"), weight 74.4 kg (164 lb), SpO2 99%.  Intake/Output last 24h:    Intake/Output Summary (Last 24 hours) at 8/9/2024 1746  Last data filed at 8/9/2024 1700  Gross per 24 hour   Intake 2760 ml   Output 970 ml   Net 1790 ml       Relevant Results  Results from last 72 hours   Lab Units 08/09/24  1421 08/08/24  1959 08/08/24  0906 08/07/24  1802 08/07/24  0731 08/07/24  0730   WBC AUTO x10*3/uL 16.5*  --  12.3* 13.7*   < >  --    HEMOGLOBIN g/dL 8.9*  --  7.8* 7.8*   < >  --    HEMATOCRIT % 26.8*  --  24.9* 24.7*   < >  --    PLATELETS AUTO x10*3/uL 493*  --  711* 657*   < >  --    INR   --  1.6* 1.3* 1.5*  --   --    SODIUM mmol/L 139  --  143 139  --  139   POTASSIUM mmol/L 4.0  --  3.7 3.4*  --  3.4*   CHLORIDE mmol/L 105  --  105 104  --  102   CO2 mmol/L 27  --  29 28  --  28   BUN mg/dL 11  --  10 11  --  11   CREATININE mg/dL 0.91  --  0.87 1.10*  --  0.95   GLUCOSE mg/dL 97  --  74 114*  --  78   CALCIUM mg/dL 7.4*  --  8.3* 8.1*  --  7.8*   MAGNESIUM mg/dL  --   --  1.86  --   --  1.83   PHOSPHORUS mg/dL 4.7  --  4.1 3.4  --  3.6   ALBUMIN g/dL 2.0*  --  2.2* 2.3*  --  2.2*    < > = values in this interval not displayed.     Scheduled medications  acetaminophen, 650 mg, oral, q6h  atorvastatin, 40 mg, oral, Daily  citalopram, 20 mg, oral, Daily  cyclobenzaprine, 5 mg, oral, TID  lidocaine, 1 patch, transdermal, Daily  polyethylene glycol, 17 g, oral, BID      Continuous medications  lactated Ringer's, 100 mL/hr, Last Rate: 100 mL/hr (08/09/24 1400)  lactated Ringer's, 100 mL/hr  lactated " Ringer's, 100 mL/hr      PRN medications  PRN medications: bisacodyl, dextrose, dextrose, droperidol, glucagon, glucagon, HYDROmorphone, HYDROmorphone, HYDROmorphone, HYDROmorphone, naloxone, ondansetron **OR** ondansetron, ondansetron, oxyCODONE, oxyCODONE, oxyCODONE, oxygen         Assessment/Plan   Ms Veras is a 58 year old F, w/ PMHx of HLD, HTN, depression, anxiety, presents to  ED at the advice of her physician due to US findings of IVC and right renal vein thrombus. Imaging findings consistent with significant metastatic disease identified in adrenal glands, spine, and left upper lobe lung, unclear origin, however high suspicion for primary lung cancer.      #Suspected stage IV Non-small cell lung carcinoma  #Bilateral adrenal masses  CTPE: Left upper lobe suprahilar mass with invasion into the mediastinum. Encasement of the left upper lobe pulmonary arterial branches. Enlarged subcarinal lymph node. 0.6 cm left upper lobe perifissural nodule and 0.6 cm right lower lobe pulmonary nodule.  -Cytology consistent with non-small cell lung carcinoma  Plan:  - Ongoing goals of care discussion with patient and her family - now DNR/DNI status  - Supportive oncology consulted - appreciate recommendations  - Oncology consulted - no formal recs until genetics is available, will plan for outpatient follow up, referral sent      #Right Iliac Osteolytic Lesions  #Ventral Epidural Lesion   #Pathologic T8 Compression Fracture   - CT A/P: lytic destructive lesion with mild pathologic compression deformity of the T8 vertebral body without retropulsion. There is ventral epidural tumor involvement at T7 and T8 with at least moderate central canal stenosis at that level.  Plan:  -Neurosurgery consulted - s/p decompression/fusion 8/8  -Oxycontin 10mg q12 hrs + Naproxen 500mg BID + PRN dilaudid 0.5 + oxycodone 5mg q4 for pain - see supportive onc pain recommendations  - Will update pain regimen per neurosurgery following OR      #Anemia   #Thrombocythemia   #IVC Thrombus   -No evidence of overt GI bleed. Presentation likely iso of malignancy. Patient with no history of vascular disease or history of blood clots  - LE duplex to evaluate for DVTs negative  - Echo 8/3 with concern for IVC thrombus  Plan:  -Vascular medicine consulted 8/1, appreciate recommendations         - Need to differentiate between bland tumor vs tumor + thrombus - ideally PET to evaluate, should be done outpatient  - Outpatient follow up scheduled 10/22 @ 2:30  - Hold heparin postoperatively, follow neurosurgery recommendations, will plan for SCDs for time being  - Monitor closely for signs of VTE: SOB, tachy, chest pain, pain/swelling of extremities      #HTN  #HLD   -Holding home HTN meds iso soft pressures since admission   -C/w Atorvastatin 40 mg once daily     #Depression   #Anxiety   -C/w home citalopram and hydroxyzine      F: PRN  E: PRN  N: regular  A: pIV  DVT: Heparin gtt  GI: Protonix     Bowel Regimen: Miralax + Doc-Senna  Code Status: DNR/DNI  NOK: Adan Veras (son): [phone]        Recommendations not final until attested.   Marc Montez MD

## 2024-08-09 NOTE — PROGRESS NOTES
Subjective   Kassandra Feldmaner is a 58 y.o. female on day 9 of admission   No acute events overnight    Objective   Physical Exam  AOx3  RUE D5 B5 T4+ HG/IO 4+  RLE HF4+ KE5 DF/PF 5  LUE D5 B5 T5 HG/IO 5  LLE HF4+ KE5 DF/PF 5  SILT    Assessment/Plan   Kassandra is a 58 y.o. female with h/o HTN, HLD, depression, anxiety p/w hypotension and tachycardia, RUQ US IVC thrombus, R renal mass, CT PE L perihilar mass encasing L pulmonary artery, CT AP b/l adrenal masses, R adrenal mass w/ extension into infrahepatic IVC, T8 compression fracture w 50% height loss, ventral epidural lesion, R iliac osteolytic lesion, MRI brain neg, MRI C/L neg, MRI T spine T8 lesion w sig epidural extension, 8/2 s/p EBUS, 8/3 TTE 60-65% EF    PLAN  Garcia primary  OR 8/8 today for T8 transpedic decompression, T6-10 fusion  Final path  Supportive onc recs  Endo recs  Vasc med recs    I have referenced the HP dated 8/1 and any changes are noted in progress note.      Carole Lockwood MD

## 2024-08-09 NOTE — ANESTHESIA POSTPROCEDURE EVALUATION
Patient: Kassandra Veras    Procedure Summary       Date: 08/09/24 Room / Location: [facility] OR 10 / Virtual [facility] OR    Anesthesia Start: 0804 Anesthesia Stop: 1403    Procedure: Decompression Spine with Instrumentation (Spine Lumbar) Diagnosis:       Malignancy (Multi)      (Malignancy (Multi) [C80.1])    Surgeons: Sunil Dalton MD PhD Responsible Provider: Maciej Bailey MD    Anesthesia Type: general ASA Status: 4            Anesthesia Type: general    Vitals Value Taken Time   /71 08/09/24 1400   Temp 36.3 °C (97.3 °F) 08/09/24 1400   Pulse 72 08/09/24 1409   Resp 10 08/09/24 1409   SpO2 100 % 08/09/24 1409   Vitals shown include unfiled device data.    Anesthesia Post Evaluation    Patient location during evaluation: PACU  Patient participation: complete - patient participated  Level of consciousness: awake  Pain management: adequate  Airway patency: patent  Cardiovascular status: acceptable  Respiratory status: acceptable  Hydration status: acceptable  Postoperative Nausea and Vomiting: none        No notable events documented.

## 2024-08-09 NOTE — CARE PLAN
The patient's goals for the shift include      The clinical goals for the shift include pt will remain free from injuries during shift    Pt safe at this time

## 2024-08-09 NOTE — ADDENDUM NOTE
Addendum  created 08/09/24 1424 by Maciej Bailey MD    Clinical Note Signed, Intraprocedure Meds edited

## 2024-08-09 NOTE — ANESTHESIA PROCEDURE NOTES
Central Venous Line:    Date/Time: 8/9/2024 8:30 AM    A central venous line was placed in the OR for the following indication(s): central venous access.  Staffing  Performed: attending   Authorized by: Maciej Bailey MD    Performed by: Maciej Bailey MD    Sterility preparation included the following: provider hand hygiene performed prior to central venous catheter insertion, all 5 sterile barriers used (gloves, gown, cap, mask, large sterile drape) during central venous catheter insertion, antiseptic used during central venous catheter insertion and skin prep agent completely dried prior to procedure.  Medical reason for not performing maximal sterile barrier technique: no  The patient was placed in Trendelenburg position.    Right internal jugular vein was prepped.    The site was prepped with Chlorhexidine.  Size: 7 Fr (8 Fr)   Length: 20  Catheter type: introducer   Number of Lumens: double lumen    This catheter was not an oximetric catheter.    During the procedure, the following specific steps were taken: target vein identified, needle advanced into vein and blood aspirated and guidewire advanced into vein.  Seldinger technique used.  Procedure performed using ultrasound guidance.  Sterile gel and probe cover used in ultrasound-guided central venous catheter insertion.    Intravenous verification was obtained by ultrasound, venous blood return and manometry.      Post insertion care included: all ports aspirated, all ports flushed easily, guidewire removed intact, Biopatch applied, line sutured in place and dressing applied.    During the procedure the patient experienced: patient tolerated procedure well with no complications.           images stored in chart

## 2024-08-09 NOTE — ANESTHESIA PROCEDURE NOTES
Airway  Date/Time: 8/9/2024 8:15 AM  Urgency: elective    Airway not difficult    Staffing  Performed: JOHNSON   Authorized by: Maciej Bailey MD    Performed by: JOHNSON Rodriguez  Patient location during procedure: OR    Indications and Patient Condition  Indications for airway management: anesthesia  Spontaneous Ventilation: absent  Sedation level: deep  Preoxygenated: yes  Patient position: sniffing  Mask difficulty assessment: 0 - not attempted    Final Airway Details  Final airway type: endotracheal airway      Successful airway: ETT  Cuffed: yes   Successful intubation technique: video laryngoscopy  Facilitating devices/methods: cricoid pressure and intubating stylet  Endotracheal tube insertion site: oral  Blade size: #3  ETT size (mm): 7.5  Cormack-Lehane Classification: grade I - full view of glottis  Placement verified by: chest auscultation, bronchoscopy and capnometry   Measured from: gums  ETT to gums (cm): 23  Number of attempts at approach: 1

## 2024-08-09 NOTE — OP NOTE
Decompression Spine with Instrumentation Operative Note     Date: 2024 - 2024  OR Location: [facility] OR    Name: Kassandra Veras : 1965, Age: 58 y.o., MRN: [id number], Sex: female    Diagnosis  Pre-op Diagnosis      * Malignancy (Multi) [C80.1] Post-op Diagnosis     * Malignancy (Multi) [C80.1]     Procedures  T6-T10 pedicle screw instrumentation, T7-T9 laminectomy, bilateral T8 transpedicular decompression, partial T8 corpectomy, debulking of epidural spinal tumor, allograft, posterolateral arthrodesis      Surgeons      * Sunil Dalton - Primary    Resident/Fellow/Other Assistant:  Surgeons and Role:     * Mary Jane Palacios MD - Resident - Assisting     * Jairo Cruz MD - Resident - Assisting    Procedure Summary  Anesthesia: General  ASA: IV  Anesthesia Staff: Anesthesiologist: Maciej Bailey MD  C-AA: JOHNSON Rodriguez; JOHNSON Arreaga  Estimated Blood Loss: 500mL  Intra-op Medications:   Administrations occurring from 0715 to 1155 on 24:   Medication Name Total Dose   lidocaine-epinephrine (Xylocaine W/EPI) 0.5 %-1:200,000 injection 10 mL   polymyxin B injection 500,000 Units   thrombin (recombinant) (Recothrom) topical solution 20,000 Units   polymyxin B 500,000 Units in sodium chloride 0.9 % 1,000 mL irrigation 10,000 mL   acetaminophen (Tylenol) tablet 975 mg Cannot be calculated   atorvastatin (Lipitor) tablet 40 mg Cannot be calculated   citalopram (CeleXA) tablet 20 mg Cannot be calculated   hydrOXYzine HCL (Atarax) tablet 25 mg Cannot be calculated   naproxen (Naprosyn) tablet 500 mg Cannot be calculated   oxyCODONE ER (OxyCONTIN) 12 hr tablet 10 mg Cannot be calculated   polyethylene glycol (Glycolax, Miralax) packet 17 g Cannot be calculated   sennosides-docusate sodium (Indira-Colace) 8.6-50 mg per tablet 2 tablet Cannot be calculated              Anesthesia Record               Intraprocedure I/O Totals          Intake    PRBC 700.00 mL    NaCl 0.9 % bolus 600.00  mL    Remifentanil Drip 0.00 mL    The total shown is the total volume documented since Anesthesia Start was filed.    Phenylephrine Drip 0.00 mL    The total shown is the total volume documented since Anesthesia Start was filed.    LR infusion 200.00 mL    lactated Ringer's infusion 1200.00 mL    Total Intake 2700 mL       Output    Est. Blood Loss 500 mL    Total Output 500 mL       Net    Net Volume 2200 mL          Specimen:   ID Type Source Tests Collected by Time   1 : t8 tumor Tissue SPINE SURGICAL PATHOLOGY EXAM Sunil Dalton MD PhD 8/9/2024 1217        Staff:   Circulator: Yobany Ledezma Person: Jocy Gallagher Circulator: Dalia Gallagher Scrub: Brent         Drains and/or Catheters:   Urethral Catheter Non-latex 16 Fr. (Active)           Implants:  Implants       Type Name Action Serial No.      Graft COMPONENT, SIGNIFY, BIOACTIVE CRUNCH, 10CC - [id number] Implanted      Screw SCREW, RELINE-O, 6.5X45MM 2S POLYAXIAL - [id number] Implanted      Neuro Interventional Implant SCREW, RELINE LOCK, 5.5MM OPEN TULIP - [id number] Implanted       nuvasive 300mm dorys Implanted               Findings: compressive ventral hematoma    Indications: Kassandra Veras is an 58 y.o. female who is having surgery for Malignancy (Multi) [C80.1].  Patient has a history of metastatic breast cancer who is presenting with refractory back pain and was found to have a ventral compressive epidural metastatic lesion at T8 with cord compression.  Risk and benefits are discussed with the patient including increasing weakness, numbness, CSF leak, infection, death.    The patient was seen in the preoperative area. The risks, benefits, complications, treatment options, non-operative alternatives, expected recovery and outcomes were discussed with the patient. The possibilities of reaction to medication, pulmonary aspiration, injury to surrounding structures, bleeding, recurrent infection, the need for additional procedures, failure to  diagnose a condition, and creating a complication requiring transfusion or operation were discussed with the patient. The patient concurred with the proposed plan, giving informed consent.  The site of surgery was properly noted/marked if necessary per policy. The patient has been actively warmed in preoperative area. Preoperative antibiotics have been ordered and given within 1 hours of incision. Venous thrombosis prophylaxis have been ordered including bilateral sequential compression devices    Procedure Details: The patient was brought back from PACU to operative suite by anesthesia. After patient was appropriately checked in by nursing staff, anesthesia was induced and general endotracheal intubation was performed. After obtaining appropriate access by anesthesia, SSEPs and MEPs were hooked up to patient, and patient was flipped into prone position on Eliezer table with hip pads with the arms placed on arm rests, ensuring the axillae and abdomen hung freely.     Patient was prepped and draped in usual sterile fashion. Incision was localized using C-arm from sacrum up to desired levels. remarked and 1% lidocaine with epinephrine was used to infiltrate skin under marked incision.    A combination of sharp and blunt electrocautery was used to expose spinous process, and lamina out to facet joint capsule from T6-T10 with care taken to not violate this capsule with the bovey.   Screws were placed in usual fashion, first using gearshift and 5.5mm tap using standard freehand technique to create a screw trajectory in the pedicle, followed by confirmation that there was no breach in the pedicle using feeler ball, and finally, placing pedicle screws from T6-T10 under navigated guidance. 6.5-mm diameter screws were used at all levels, except for at the level at which our planned transpedicular decompression was planned. At this level, the T8 pedicle was sequentially cored out using taps. After satisfactory placement of  screws, attention was then focused to the decompression.    First, a wide laminectomy was carried out by first biting of the spinous process and posterior tension band from T7 to T9. The matchstick drill was used to further thin bone overlying thecal sac until ligamentum flavum was exposed for the theodore-caudal extent of the laminectomy. A combination of curettes and kerrasin rongeurs were used to complete the laminectomy. [place] dental tool was used to confirm good decompression in theodore-caudal dimensions and of lateral recesses.     Then, attention was turned toward the transpedicular component of the decompression. A facetectomy was performed at T7/8 bilaterally and T8/9 bilaterally, exposing the T8 pedicle and exiting nerve room which had already been cored out previously. The medial aspect of the pedicle was then drilled out using the matchstick drill until the lateral aspect of the central canal was breached. Some of this bone was sent for permanent pathology. The ligament was carefully removed, and using Popeye curettes and a mallet, any ventral compression was tamped down until the cord was circumferentially decompressed. Approximately 30-40% of the T8 vertebral body was debulking because of the tumor infiltration. We then confirmed there were no further areas of ventral or dorsal compression using intraoperative ultrasound.     Once adequate exposure was obtained, a spinous process clamp with stealth star was placed on T6 spinous process, and O-arm was brought in for an intra-operative CT (O-arm, [employer]) and screw confirmation. The right T6 and T7 screw were medialized slightly more then needed so navigation guidance was used to redirect. We confirmed that there was no dural violation.    After adequate separation had been achieved, 5.5mm titanium rods were cut to appropriate lengths, bent, and placed into the tulipheads at all levels. Set caps were used at all levels, and reduction towers were used  to achieve some correction of the patient's positive sagittal balance. Final fluroscopy shots were taken prior to final tightening to confirm adequate placement of all hardware.     Native bone was decorticated with particular care taken at the facet joints, and a combination of allograft in the form of signify was packed along the extent of the construct for segmental arthrodesis from T6 to T10.    The incision was copiously irrigated with Irricept, followed by antibiotic-impregnated saline. One 10-round drains were placed in the subfascial space and tunneled out inferiorly to the incision. 0-vicryl suture was used to close the muscle over the laminectomy defect, followed by another layer of 0-vicryl suture was used to close the entirety of the linear fascial defect. 1 Stratafix was used to oversew fascia. 30mL of Marcaine was used for local anesthesia. 2-0 vicryls were used to approximate the skin edges and staples were used to close skin. The drain was secured with a 2-0 silk suture. A prevena was affixed on top of the incision. Patient was carefully flipped into supine position on patient cart, and turned over to anesthesia to extubate.     SSEPs and MEPS were at baseline at the end of the case. All counts were correct at end of case without any obvious complication.   Complications:  None; patient tolerated the procedure well.    Disposition: PACU - hemodynamically stable.  Condition: stable       Attending Attestation: I was present and scrubbed for the key portions of the procedure.    Sunil Dalton  Phone Number: [phone]

## 2024-08-10 ENCOUNTER — APPOINTMENT (OUTPATIENT)
Dept: CARDIOLOGY | Facility: HOSPITAL | Age: 59
End: 2024-08-10
Payer: COMMERCIAL

## 2024-08-10 ENCOUNTER — APPOINTMENT (OUTPATIENT)
Dept: RADIOLOGY | Facility: HOSPITAL | Age: 59
End: 2024-08-10
Payer: COMMERCIAL

## 2024-08-10 LAB
ANION GAP SERPL CALC-SCNC: 14 MMOL/L (ref 10–20)
BUN SERPL-MCNC: 11 MG/DL (ref 6–23)
CALCIUM SERPL-MCNC: 7.1 MG/DL (ref 8.6–10.6)
CHLORIDE SERPL-SCNC: 102 MMOL/L (ref 98–107)
CO2 SERPL-SCNC: 26 MMOL/L (ref 21–32)
CREAT SERPL-MCNC: 0.75 MG/DL (ref 0.5–1.05)
EGFRCR SERPLBLD CKD-EPI 2021: >90 ML/MIN/1.73M*2
ERYTHROCYTE [DISTWIDTH] IN BLOOD BY AUTOMATED COUNT: 15.9 % (ref 11.5–14.5)
GLUCOSE BLD MANUAL STRIP-MCNC: 119 MG/DL (ref 74–99)
GLUCOSE SERPL-MCNC: 80 MG/DL (ref 74–99)
HCT VFR BLD AUTO: 27.4 % (ref 36–46)
HGB BLD-MCNC: 8.5 G/DL (ref 12–16)
MCH RBC QN AUTO: 27.3 PG (ref 26–34)
MCHC RBC AUTO-ENTMCNC: 31 G/DL (ref 32–36)
MCV RBC AUTO: 88 FL (ref 80–100)
NRBC BLD-RTO: 0.3 /100 WBCS (ref 0–0)
PLATELET # BLD AUTO: 563 X10*3/UL (ref 150–450)
POTASSIUM SERPL-SCNC: 4.6 MMOL/L (ref 3.5–5.3)
RBC # BLD AUTO: 3.11 X10*6/UL (ref 4–5.2)
SODIUM SERPL-SCNC: 137 MMOL/L (ref 136–145)
WBC # BLD AUTO: 12 X10*3/UL (ref 4.4–11.3)

## 2024-08-10 PROCEDURE — 2500000001 HC RX 250 WO HCPCS SELF ADMINISTERED DRUGS (ALT 637 FOR MEDICARE OP): Performed by: STUDENT IN AN ORGANIZED HEALTH CARE EDUCATION/TRAINING PROGRAM

## 2024-08-10 PROCEDURE — 72070 X-RAY EXAM THORAC SPINE 2VWS: CPT

## 2024-08-10 PROCEDURE — 2500000004 HC RX 250 GENERAL PHARMACY W/ HCPCS (ALT 636 FOR OP/ED)

## 2024-08-10 PROCEDURE — 93005 ELECTROCARDIOGRAM TRACING: CPT

## 2024-08-10 PROCEDURE — 2500000001 HC RX 250 WO HCPCS SELF ADMINISTERED DRUGS (ALT 637 FOR MEDICARE OP)

## 2024-08-10 PROCEDURE — 99233 SBSQ HOSP IP/OBS HIGH 50: CPT

## 2024-08-10 PROCEDURE — 1200000002 HC GENERAL ROOM WITH TELEMETRY DAILY

## 2024-08-10 PROCEDURE — 2500000004 HC RX 250 GENERAL PHARMACY W/ HCPCS (ALT 636 FOR OP/ED): Performed by: STUDENT IN AN ORGANIZED HEALTH CARE EDUCATION/TRAINING PROGRAM

## 2024-08-10 PROCEDURE — 82947 ASSAY GLUCOSE BLOOD QUANT: CPT

## 2024-08-10 PROCEDURE — 93010 ELECTROCARDIOGRAM REPORT: CPT | Performed by: INTERNAL MEDICINE

## 2024-08-10 PROCEDURE — 85027 COMPLETE CBC AUTOMATED: CPT | Performed by: STUDENT IN AN ORGANIZED HEALTH CARE EDUCATION/TRAINING PROGRAM

## 2024-08-10 PROCEDURE — 80048 BASIC METABOLIC PNL TOTAL CA: CPT | Performed by: STUDENT IN AN ORGANIZED HEALTH CARE EDUCATION/TRAINING PROGRAM

## 2024-08-10 RX ORDER — HYDROMORPHONE HYDROCHLORIDE 1 MG/ML
0.5 INJECTION, SOLUTION INTRAMUSCULAR; INTRAVENOUS; SUBCUTANEOUS
Status: DISCONTINUED | OUTPATIENT
Start: 2024-08-10 | End: 2024-08-14

## 2024-08-10 RX ORDER — OXYCODONE HYDROCHLORIDE 5 MG/1
5 TABLET ORAL EVERY 4 HOURS PRN
Status: DISCONTINUED | OUTPATIENT
Start: 2024-08-10 | End: 2024-08-16 | Stop reason: HOSPADM

## 2024-08-10 RX ORDER — HEPARIN SODIUM 5000 [USP'U]/ML
5000 INJECTION, SOLUTION INTRAVENOUS; SUBCUTANEOUS EVERY 8 HOURS
Status: DISCONTINUED | OUTPATIENT
Start: 2024-08-10 | End: 2024-08-12

## 2024-08-10 ASSESSMENT — PAIN SCALES - GENERAL
PAINLEVEL_OUTOF10: 7
PAINLEVEL_OUTOF10: 5 - MODERATE PAIN
PAINLEVEL_OUTOF10: 9
PAINLEVEL_OUTOF10: 6
PAINLEVEL_OUTOF10: 0 - NO PAIN
PAINLEVEL_OUTOF10: 9
PAINLEVEL_OUTOF10: 9

## 2024-08-10 ASSESSMENT — COGNITIVE AND FUNCTIONAL STATUS - GENERAL
WALKING IN HOSPITAL ROOM: A LOT
STANDING UP FROM CHAIR USING ARMS: A LITTLE
HELP NEEDED FOR BATHING: A LOT
CLIMB 3 TO 5 STEPS WITH RAILING: A LOT
MOBILITY SCORE: 18
DRESSING REGULAR UPPER BODY CLOTHING: A LITTLE
TOILETING: A LOT
DAILY ACTIVITIY SCORE: 18
DRESSING REGULAR LOWER BODY CLOTHING: A LITTLE
MOVING TO AND FROM BED TO CHAIR: A LITTLE

## 2024-08-10 ASSESSMENT — PAIN DESCRIPTION - LOCATION: LOCATION: SHOULDER

## 2024-08-10 ASSESSMENT — PAIN - FUNCTIONAL ASSESSMENT
PAIN_FUNCTIONAL_ASSESSMENT: 0-10
PAIN_FUNCTIONAL_ASSESSMENT: 0-10

## 2024-08-10 NOTE — PROGRESS NOTES
"Kassandra Veras is a 58 y.o. female on hospital day 10 of admission presenting with Mass of upper lobe of left lung.    Subjective   No acute events overnight.  Patient feels well this morning, feels like her pain is relatively well-controlled with current regimen.  Denies any new weakness/numbness, has not had a bowel movement since surgery.  No fevers or chills.       Objective     Physical Exam  Vitals reviewed.   Constitutional:       General: She is awake. She is not in acute distress.     Appearance: Normal appearance.   HENT:      Mouth/Throat:      Mouth: Mucous membranes are moist.      Pharynx: Oropharynx is clear.   Eyes:      Extraocular Movements: Extraocular movements intact.   Cardiovascular:      Rate and Rhythm: Normal rate and regular rhythm.      Heart sounds: Normal heart sounds. No murmur heard.  Pulmonary:      Effort: Pulmonary effort is normal. No respiratory distress.      Breath sounds: Normal breath sounds. No wheezing or rhonchi.   Abdominal:      General: Abdomen is flat. Bowel sounds are normal.      Palpations: Abdomen is soft.      Tenderness: There is no abdominal tenderness.   Musculoskeletal:         General: Normal range of motion.      Cervical back: Normal range of motion and neck supple.      Right lower leg: No edema.      Left lower leg: No edema.      Comments: Tenderness to palpation of the thoracic spine  Surgical drain in place draining serosanguinous fluid   Skin:     General: Skin is warm and dry.   Neurological:      Mental Status: She is alert. Mental status is at baseline.   Psychiatric:         Behavior: Behavior is cooperative.         Last Recorded Vitals  Blood pressure 119/80, pulse 73, temperature 35.6 °C (96.1 °F), resp. rate 14, height 1.676 m (5' 6\"), weight 74.4 kg (164 lb), SpO2 97%.  Intake/Output last 24h:    Intake/Output Summary (Last 24 hours) at 8/10/2024 0707  Last data filed at 8/10/2024 0600  Gross per 24 hour   Intake 2760 ml   Output 1130 ml   Net " 1630 ml       Relevant Results  Results from last 72 hours   Lab Units 08/10/24  0409 08/09/24  1421 08/08/24  1959 08/08/24  0906 08/07/24  1802 08/07/24  0731 08/07/24  0730   WBC AUTO x10*3/uL 12.0* 16.5*  --  12.3* 13.7*   < >  --    HEMOGLOBIN g/dL 8.5* 8.9*  --  7.8* 7.8*   < >  --    HEMATOCRIT % 27.4* 26.8*  --  24.9* 24.7*   < >  --    PLATELETS AUTO x10*3/uL 563* 493*  --  711* 657*   < >  --    INR   --   --  1.6* 1.3* 1.5*  --   --    SODIUM mmol/L 137 139  --  143 139  --  139   POTASSIUM mmol/L 4.6 4.0  --  3.7 3.4*  --  3.4*   CHLORIDE mmol/L 102 105  --  105 104  --  102   CO2 mmol/L 26 27  --  29 28  --  28   BUN mg/dL 11 11  --  10 11  --  11   CREATININE mg/dL 0.75 0.91  --  0.87 1.10*  --  0.95   GLUCOSE mg/dL 80 97  --  74 114*  --  78   CALCIUM mg/dL 7.1* 7.4*  --  8.3* 8.1*  --  7.8*   MAGNESIUM mg/dL  --   --   --  1.86  --   --  1.83   PHOSPHORUS mg/dL  --  4.7  --  4.1 3.4  --  3.6   ALBUMIN g/dL  --  2.0*  --  2.2* 2.3*  --  2.2*    < > = values in this interval not displayed.     Scheduled medications  acetaminophen, 650 mg, oral, q6h  atorvastatin, 40 mg, oral, Daily  citalopram, 20 mg, oral, Daily  cyclobenzaprine, 5 mg, oral, TID  heparin (porcine), 5,000 Units, subcutaneous, q8h  ketorolac, 30 mg, intravenous, q6h PATRICE  lidocaine, 1 patch, transdermal, Daily  polyethylene glycol, 17 g, oral, BID      Continuous medications  lactated Ringer's, 100 mL/hr, Last Rate: 100 mL/hr (08/09/24 1800)      PRN medications  PRN medications: bisacodyl, dextrose, dextrose, glucagon, glucagon, HYDROmorphone, naloxone, ondansetron **OR** ondansetron, oxyCODONE, oxyCODONE, oxyCODONE         Assessment/Plan   Ms Veras is a 58 year old F, w/ PMHx of HLD, HTN, depression, anxiety, presents to  ED at the advice of her physician due to US findings of IVC and right renal vein thrombus. Imaging findings consistent with significant metastatic disease identified in adrenal glands, spine, and left upper lobe  lung, unclear origin, however high suspicion for primary lung cancer. 8/9/2024 s/p T7-T9 lami, T8 bitranspedicular decompression for tumor debulking, T6-10 instrumented fusion w/ NSGY. Path from EBUS 8/2 showing poorly differentiated NSCLC.     Updates 8/10:   - per NSGY hold therapeutic AC until at least POD 3  - remove deng, trial of void  - SCDs in place, continue ppx heparin      #Suspected stage IV Non-small cell lung carcinoma  #Bilateral adrenal masses  CTPE: Left upper lobe suprahilar mass with invasion into the mediastinum. Encasement of the left upper lobe pulmonary arterial branches. Enlarged subcarinal lymph node. 0.6 cm left upper lobe perifissural nodule and 0.6 cm right lower lobe pulmonary nodule.  -Cytology consistent with non-small cell lung carcinoma; path showing poorly differentiated NSCLC  Plan:  - Supportive oncology consulted - appreciate recommendations  - Oncology consulted - no formal recs until a final biopsy result is available (will touch base with them after the weekend regarding outpt follow up), appreciate recommendations     #Right Iliac Osteolytic Lesions  #Ventral Epidural Lesion   #Pathologic T8 Compression Fracture   - CT A/P: lytic destructive lesion with mild pathologic compression deformity of the T8 vertebral body without retropulsion. There is ventral epidural tumor involvement at T7 and T8 with at least moderate central canal stenosis at that level.  -8/9/2024 s/p T7-T9 lami, T8 bitranspedicular decompression for tumor debulking, T6-10 instrumented fusion w/ NSGY  Plan:  - Hold therapeutic AC until POD 3 per neurosurgery   -Oxycontin 10mg q12 hrs + Naproxen 500mg BID + PRN dilaudid 0.5 + oxycodone 5mg q4 for pain - see supportive onc pain recommendations    #Goals of Care   :: Per discussion with patient AM of 8/10, she had previously said that she would want to be DNR/DNI; however this AM, patient still had full code status order from OR, so I addressed this with her;  she states that she thinks her previous DNR/DNI was pre-mature, and that at this time she would want to be full code. She says that if her pathology returns and oncology feels that she has a very poor prognosis then she would re-visit code status.   Plan:   - given the above discussion, will remain FULL CODE status  - Continuing GoC w/ supportive onc and oncology    #Anemia   #Thrombocytosis   #Intrahepatic IVC Thrombus   -No evidence of overt GI bleed. Presentation likely iso of malignancy. Patient with no history of vascular disease or history of blood clots  Plan:  -Vascular medicine consulted, appreciate recommendations              ~FOLLOW NSGY recommendations regarding restarting Heparin infusion in the postoperative setting.   ~If unable to restart Heparin infusion after surgery, consider Heparin 5K q8 hours for DVT chemoprophylaxis  ~Patient to wear SCDs to BLE at all times after surgery  ~MONITOR for bleeding  ~Follow up Lupus Anticoagulant results  ~Monitor closely for signs of VTE in the postoperative setting: SOB, tachycardia, chest pain, pain/swelling of extremities etc.   ~Will need PET scan completed as outpatient to determine tumor thrombus vs bland thrombus in the IVC/RA  - Outpatient follow up scheduled 10/22 @ 2:30  - Holding AC until POD 3 per neurosurgery  - Patient needs SCDs post-operatively; in place   - Will monitor closely for signs of VTE: SOB, tachy, chest pain, pain/swelling of extremities     #HTN  #HLD   -Holding home HTN meds iso soft pressures since admission   -C/w Atorvastatin 40 mg once daily     #Depression   #Anxiety   -C/w home citalopram and hydroxyzine      F: PRN  E: PRN  N: regular  A: pIV  DVT: subcutaneous heparin, SCDs  GI: N/A     Bowel Regimen: Miralax sched, PRN dulcolax   Code Status: Full code  NOK: Adan Veras (son): [phone]       Patient seen and staffed with attending physician. Recommendations not final until attested.   Angel Toussaint MD

## 2024-08-10 NOTE — PROGRESS NOTES
SUPPORTIVE AND PALLIATIVE ONCOLOGY INPATIENT FOLLOW-UP    SERVICE DATE: 08/10/24     SUBJECTIVE:  HISTORY OF PRESENT ILLNESS: Kassandra Veras is a 58 y.o. female with c/f suspected NSCLC with involvement of LLL lung, spine, R kidney, and bilateral adrenal glands (s/p LN biopsy). Of note, pt underwent T6-T10 pedicle screw instrumentation, T7-T9 laminectomy, bilateral T8 transpedicular decompression, partial T8 corpectomy, debulking of epidural spinal tumor, allograft, and posterolateral arthrodesis with Dr. Dalton on 24. PMHx significant for HTN, HLD, depression, and anxiety. Admitted for further evaluation and management of abdominal pain, spinal pain, hypotension, tachycardia, and Oncology work up of malignancy with metastasis. Course complicated by acute cancer related and post-operative pain. Supportive and Palliative Oncology is following for assistance with pain management.     Interval Events:  Pt underwent T6-T10 pedicle screw instrumentation, T7-T9 laminectomy, bilateral T8 transpedicular decompression, partial T8 corpectomy, debulking of epidural spinal tumor, allograft, and posterolateral arthrodesis with Dr. Dalton on 24.    Pain well-controlled since yesterday's surgery--see Assessment & Plan for minor recommended modifications.     Pain Assessment:  Location: Mid back, shoulders  Duration: Constant  Characteristics:  Ratin/10  Descriptors: Aching, sore, occasionally sharp  Aggravating: Movement   Relieving: Analgesics--see EMR, positioning, and modifying activity  Interference with Function: A little    Opioid Use  Past 24h opioid use:   (-8/10, 1855-7137)  fentanyl 50mcg IV x 2 = 100mcg IV  oxycodone IR 10mg x 3 = 30mg = 37.5 OME  hydromorphone 0.2mg IV x 7 = 1.4mg IV = 17.5 OME  hydromorphone 0.3mg IV x 3 = 0.9mg IV = 11.25 OME    Total 24h OME use: 66.25 OME, fentanyl 100mcg IV     Note: OME calculations based on equianalgesic table below. Please note this table is based on best  available evidence but conversions are still approximate. These are NOT opioid DOSES for individual patient use; this is equivalency information.  Drug Parenteral Enteral   Morphine 10 25   Oxycodone N/A 20   Hydromorphone 2 5   Fentanyl 0.15 N/A   Tramadol N/A 120   Citation: Camila PANIAGUA. Demystifying opioid conversion calculations: A guide for effective dosing, Second edition. [place] Carlos: [employer]-System Pharmacists, 2018.    Symptom Assessment:  Nausea: none--pt states it resolved after constipation resolved  Constipation: none--x2 BM on 8/9/24 per pt  Anxiety: none--feels well supported by primary team, RN staff, and family    Information obtained from: chart review, interview of patient, and discussion with primary team  ______________________________________________________________________   OBJECTIVE:    Lab Results   Component Value Date    WBC 12.0 (H) 08/10/2024    HGB 8.5 (L) 08/10/2024    HCT 27.4 (L) 08/10/2024    MCV 88 08/10/2024     (H) 08/10/2024      Lab Results   Component Value Date    GLUCOSE 80 08/10/2024    CALCIUM 7.1 (L) 08/10/2024     08/10/2024    K 4.6 08/10/2024    CO2 26 08/10/2024     08/10/2024    BUN 11 08/10/2024    CREATININE 0.75 08/10/2024     Lab Results   Component Value Date    ALT 11 07/31/2024    AST 14 07/31/2024    ALKPHOS 154 (H) 07/31/2024    BILITOT 0.6 07/31/2024     Estimated Creatinine Clearance: 82.2 mL/min (by C-G formula based on SCr of 0.75 mg/dL).     Scheduled medications  acetaminophen, 650 mg, oral, q6h  atorvastatin, 40 mg, oral, Daily  citalopram, 20 mg, oral, Daily  cyclobenzaprine, 5 mg, oral, TID  heparin (porcine), 5,000 Units, subcutaneous, q8h  ketorolac, 30 mg, intravenous, q6h PATRICE  lidocaine, 1 patch, transdermal, Daily  polyethylene glycol, 17 g, oral, BID    Continuous medications  lactated Ringer's, 100 mL/hr, Last Rate: 100 mL/hr (08/09/24 1800)    PRN medications  bisacodyl, 10 mg, Daily PRN  dextrose,  12.5 g, q15 min PRN  dextrose, 25 g, q15 min PRN  glucagon, 1 mg, q15 min PRN  glucagon, 1 mg, q15 min PRN  HYDROmorphone, 0.2 mg, q4h PRN  naloxone, 0.2 mg, q5 min PRN  ondansetron, 4 mg, q8h PRN   Or  ondansetron, 4 mg, q8h PRN  oxyCODONE, 10 mg, q4h PRN  oxyCODONE, 2.5 mg, q4h PRN  oxyCODONE, 5 mg, q4h PRN    PHYSICAL EXAMINATION:    Vital Signs:   Vital signs reviewed  Visit Vitals  /80   Pulse 73   Temp 35.6 °C (96.1 °F)   Resp 14      0-10 (Numeric) Pain Score: 6     Physical Exam  Vitals reviewed.   Constitutional:       Comments: Alert, awake woman laying in bed. No signs of acute distress. Pleasant, cooperative, and participating in interview.     HENT:      Head:      Comments: Normocephalic, atraumatic.     Eyes:      Comments: Sclera clear, EOM intact.   Neck:      Comments: Trachea midline.  Pulmonary:      Comments: Symmetrical chest rise. Regular rate and depth of respirations. Room air.   Abdominal:      Comments: Abdomen non distended, non tender, and soft.   Musculoskeletal:      Comments: Normal muscle strength and tone. ADAMS x4. No visible extremity edema.   Skin:     Comments: No lesions, rash, or abrasions present on visible skin. Skin color appropriate for ethnicity.   Neurological:      Comments: A&Ox4, follows commands, no apparent sensory deficits.   Psychiatric:      Comments: Mood and behavior appropriate.       ASSESSMENT/PLAN:  Kassandra Veras is a 58 y.o. female with c/f suspected NSCLC with involvement of LLL lung, spine, R kidney, and bilateral adrenal glands (s/p LN biopsy). Of note, pt underwent T6-T10 pedicle screw instrumentation, T7-T9 laminectomy, bilateral T8 transpedicular decompression, partial T8 corpectomy, debulking of epidural spinal tumor, allograft, and posterolateral arthrodesis with Dr. Dalton on 8/9/24. PMHx significant for HTN, HLD, depression, and anxiety. Admitted for further evaluation and management of abdominal pain, spinal pain, hypotension, tachycardia,  and Oncology work up of malignancy with metastasis. Course complicated by acute cancer related and post-operative pain. Supportive and Palliative Oncology is following for assistance with pain management.     Neoplasm Related Pain  C/b Acute Post Operative Pain  Back pain related to known malignancy c/b acute post-operative period.  Underwent T6-T10 pedicle screw instrumentation, T7-T9 laminectomy, bilateral T8 transpedicular decompression, partial T8 corpectomy, debulking of epidural spinal tumor, allograft, and posterolateral arthrodesis with Dr. Dalton on 8/9/24.  Pain is: Cancer related, acute post-operative  Type: Somatic, at risk for neuropathic and visceral  Pain control: Sub-optimally controlled  Home regimen: ibuprofen 200mg x3 tabs/day  Intolerances/previously tried: None  Personalized pain goal: 4/10  Total OME usage for the past 24 hours: 66.25 OME, fentanyl 100mcg IV   Reviewed: (8/10/24) Estimated Creatinine Clearance: 82.2 mL/min (by C-G formula based on SCr of 0.75 mg/dL).  Reviewed: (7/31/24) LFTs WNL  Continue scheduled acetaminophen 650mg PO q6h   Continue scheduled cyclobenzaprine 5mg PO TID--started by primary   Continue scheduled ketorolac 30mg IV x0o--mqflwpx by primary   Continue lidocaine 4% TD patch once daily   Discontinue oxycodone IR 2.5mg PO q4h PRN for mild pain  Change oxycodone IR 5mg PO q4h PRN for mild to moderate pain  Continue oxycodone IR 10mg PO q4h PRN for severe pain   Change hydromorphone to 0.5mg IV q3h PRN for breakthrough pain  Continue to monitor pain scores and administer PRN medications as appropriate  Continue/initiate nonpharmacologic pain management strategies including ice/heat therapy, distraction techniques, deep breathing/relaxation techniques, calming music, and repositioning  Continue to monitor for signs of opioid efficacy (pain scores, improved functionality) and toxicity (pinpoint pupils, excess sedation/drowsiness/confusion, respiratory depression,  etc.)    Nausea  At risk for nausea and vomiting related to opioid use and recent surgery.    Home regimen: None  EKG reviewed from 24, baseline QTc 502.   Recommend discontinuation of ondansetron iso QTc >500, unless obtaining updated EKG for review with evidence of QTc <500.  Iso QTc prolongation >500, recommend avoiding QTc prolonging medications and rather opt for anti-emetic agents such as scheduled scopolamine TD patch q72h or Tigan IM PRN if needed.    Constipation  At risk for constipation related to opioids, not currently constipated.  Usual bowel pattern: Every other day   Home regimen: None  LBM: x2 BM on 24  Continue scheduled Miralax 17g PO BID  Continue bisacodyl 10mg PO once daily PRN  Monitor BM frequency, adjust regimen as needed  Goal to have BM without straining q48-72h  Encourage mobility as tolerated, PT/OT following     Altered Mood  Hx of chronic anxiety and depression.  Well-controlled with home regimen   Home regimen: citalopram 20mg once daily, hydroxyzine 25mg PO once daily   Continue citalopram 20mg once daily  If needed, may start hydroxyzine 25mg PO BID PRN for anxiety     Medical Decision Making/Goals of Care/Advance Care Planning:  (Per Halina Pham MD's, 24 note)   Life limiting disease: metastatic malignancy  Family: .    in .  Has 2 adult daughters and 2 adult sons.  Lives with son and his family.  1 daughter lives out of town and other 2 children live close by an  Performance status independent with ADLs and needs assistance with IADLs   Joys/meaning/strength: Tejinder understands that she has mets in the spine  Understanding of health:  understands that she has metastatic disease in the lungs and spine and chemotherapy may not be helpful.  She understands that surgery is high risk and she will need time to recover.  She understands chemotherapy is not curable.  She would like to know the results of the biopsy and if the cancer  treatment then she will consider chemotherapy however if it is not treatable with chemotherapy then she would like to enroll in hospice  Information:Wants full disclosure  Goals: symptom control she would also like to know the results of the biopsy to help further medical decision making regarding treatment  Worries and fears now and future: none   Code status discussion:  We specifically discussed code status.  I explained that I have this conversation with all my patients so that their wishes can be respected in case of emergency and also to ease the burden on caregivers to make those decisions in the emergency.I explained that since she is in the hospital, it is important to discuss wishes for care during times when she is unable to tell us, so that we can provide the care that she she would want nerissa in the circumstance that she were to become very sick and her heart were to stop.  We discussed her wishes regarding intubation/CPR in case of cardiopulmonary arrest. We discussed risks of CPR.  She stated that she wanted to be full code for now but doesn't want to be in a vegetative state with brain not functioning, not being aware of surroundings and not being able to communicate. This wouldn't be quality of life and would only cause suffering.  However she wants to discuss further with her family and get back to the primary team.    8/10/24: Spoke with Angel Toussaint MD, today and Full Code status confirmed post surgery.    Advance Directives  Existence of Advance Directives: No - has interest  Decision maker: Surrogate decision maker is oldest son, Adan Veras Jr. ([phone])  Code Status: Full code    Disposition:  Please start the process of having prior authorization with meds to beds deliver medications to patient prior to discharge via [facility] pharmacy. Prescriptions will need to be sent 48-72 hours prior to discharge so that a prior authorization can be completed.     Discharge date pending resolution  of acute hospital issues, GOC, and post-operative recovery course.   Will assess closer to the time of discharge if pt qualifies for an appointment with Outpatient Supportive Oncology.     Supportive and Palliative Oncology encounter:  Spoke with patient at bedside  Emotional support provided  Coordination of care     Signature and billing:  Thank you for allowing us to participate in the care of this patient. Recommendations will be communicated back to the consulting service by way of shared electronic medical record or face-to-face.    Medical complexity was high level due to due to complexity of problems, extensive data review, and high risk of management/treatment.    I spent 50 minutes in the care of this patient which included chart review, interviewing patient/family, discussion with primary team, coordination of care, and documentation.    Data:   Diagnostic tests and information reviewed for today's visit:  Conversation with primary team, Most recent labs, Most recent EKG, Medications    Some elements copied from Halina Pham MD's, note on 8/5/24, the elements have been updated and all reflect current decision making from today, 08/10/24.    Plan of Care discussed with: Primary team, pt    Thank you for asking Supportive and Palliative Oncology to assist with care of this patient.  Recommendations will be communicated back to the consulting service by way of shared electronic medical record/secure chat/email or face-to-face.   We will continue to follow.  Please contact us for additional questions or concerns.    SIGNATURE: MARILYNN Goodwin   PAGER/CONTACT:  Contact information:  Supportive and Palliative Oncology  Monday-Friday 8 AM-5 PM  Epic Secure chat or pager [phone].  After hours and weekends:  pager [phone]

## 2024-08-10 NOTE — PROGRESS NOTES
Subjective   Kassandra Feldmaner is a 58 y.o. female on day 10 of admission   No acute events overnight    Objective   Physical Exam  AOx3  RUE D5 B5 T4+ HG/IO 4+  RLE HF4+ KE5 DF/PF 5  LUE D5 B5 T5 HG/IO 5  LLE HF4+ KE5 DF/PF 5  SILT    Assessment/Plan   Kassandra is a 58 y.o. female with h/o HTN, HLD, depression, anxiety p/w hypotension and tachycardia, RUQ US IVC thrombus, R renal mass, CT PE L perihilar mass encasing L pulmonary artery, CT AP b/l adrenal masses, R adrenal mass w/ extension into infrahepatic IVC, T8 compression fracture w 50% height loss, ventral epidural lesion, R iliac osteolytic lesion, MRI brain neg, MRI C/L neg, MRI T spine T8 lesion w sig epidural extension, 8/2 s/p EBUS, 8/3 TTE 60-65% EF    8/9 s/p T7-T9 lami, T8 bitranspedicular decompression for tumor debulking, T6-10 instrumented fusion (prelim: carcinoma)    PLAN  TRISH- downgrade to tele  Continue drain  Final path  Supportive onc recs  Endo recs  Vasc med recs  Uprights when able  OOB TID  PTOT-please encourage patient to be OOB      Carole Lockwood MD

## 2024-08-10 NOTE — ADDENDUM NOTE
Addendum  created 08/09/24 2010 by Maciej Bailey MD    Clinical Note Signed, Intraprocedure Blocks edited, SmartForm saved

## 2024-08-11 VITALS
HEART RATE: 82 BPM | SYSTOLIC BLOOD PRESSURE: 110 MMHG | HEIGHT: 66 IN | OXYGEN SATURATION: 97 % | BODY MASS INDEX: 26.36 KG/M2 | DIASTOLIC BLOOD PRESSURE: 72 MMHG | TEMPERATURE: 98.4 F | WEIGHT: 164 LBS | RESPIRATION RATE: 16 BRPM

## 2024-08-11 LAB
ALBUMIN SERPL BCP-MCNC: 2.2 G/DL (ref 3.4–5)
ANION GAP SERPL CALC-SCNC: 14 MMOL/L (ref 10–20)
BASOPHILS # BLD AUTO: 0.13 X10*3/UL (ref 0–0.1)
BASOPHILS NFR BLD AUTO: 1 %
BUN SERPL-MCNC: 14 MG/DL (ref 6–23)
CALCIUM SERPL-MCNC: 7.9 MG/DL (ref 8.6–10.6)
CHLORIDE SERPL-SCNC: 101 MMOL/L (ref 98–107)
CO2 SERPL-SCNC: 25 MMOL/L (ref 21–32)
CREAT SERPL-MCNC: 0.99 MG/DL (ref 0.5–1.05)
EGFRCR SERPLBLD CKD-EPI 2021: 65 ML/MIN/1.73M*2
EOSINOPHIL # BLD AUTO: 0.21 X10*3/UL (ref 0–0.7)
EOSINOPHIL NFR BLD AUTO: 1.7 %
ERYTHROCYTE [DISTWIDTH] IN BLOOD BY AUTOMATED COUNT: 16 % (ref 11.5–14.5)
GLUCOSE SERPL-MCNC: 77 MG/DL (ref 74–99)
HCT VFR BLD AUTO: 26.4 % (ref 36–46)
HGB BLD-MCNC: 8.2 G/DL (ref 12–16)
IMM GRANULOCYTES # BLD AUTO: 0.23 X10*3/UL (ref 0–0.7)
IMM GRANULOCYTES NFR BLD AUTO: 1.8 % (ref 0–0.9)
LYMPHOCYTES # BLD AUTO: 2.14 X10*3/UL (ref 1.2–4.8)
LYMPHOCYTES NFR BLD AUTO: 17.2 %
MAGNESIUM SERPL-MCNC: 2.32 MG/DL (ref 1.6–2.4)
MCH RBC QN AUTO: 27.1 PG (ref 26–34)
MCHC RBC AUTO-ENTMCNC: 31.1 G/DL (ref 32–36)
MCV RBC AUTO: 87 FL (ref 80–100)
MONOCYTES # BLD AUTO: 1.01 X10*3/UL (ref 0.1–1)
MONOCYTES NFR BLD AUTO: 8.1 %
NEUTROPHILS # BLD AUTO: 8.72 X10*3/UL (ref 1.2–7.7)
NEUTROPHILS NFR BLD AUTO: 70.2 %
NRBC BLD-RTO: 0.2 /100 WBCS (ref 0–0)
PHOSPHATE SERPL-MCNC: 5.5 MG/DL (ref 2.5–4.9)
PLATELET # BLD AUTO: 551 X10*3/UL (ref 150–450)
POTASSIUM SERPL-SCNC: 4.3 MMOL/L (ref 3.5–5.3)
RBC # BLD AUTO: 3.03 X10*6/UL (ref 4–5.2)
SODIUM SERPL-SCNC: 136 MMOL/L (ref 136–145)
WBC # BLD AUTO: 12.4 X10*3/UL (ref 4.4–11.3)

## 2024-08-11 PROCEDURE — 2500000004 HC RX 250 GENERAL PHARMACY W/ HCPCS (ALT 636 FOR OP/ED): Performed by: STUDENT IN AN ORGANIZED HEALTH CARE EDUCATION/TRAINING PROGRAM

## 2024-08-11 PROCEDURE — 2500000001 HC RX 250 WO HCPCS SELF ADMINISTERED DRUGS (ALT 637 FOR MEDICARE OP): Performed by: STUDENT IN AN ORGANIZED HEALTH CARE EDUCATION/TRAINING PROGRAM

## 2024-08-11 PROCEDURE — 83735 ASSAY OF MAGNESIUM: CPT

## 2024-08-11 PROCEDURE — 2500000004 HC RX 250 GENERAL PHARMACY W/ HCPCS (ALT 636 FOR OP/ED)

## 2024-08-11 PROCEDURE — 80069 RENAL FUNCTION PANEL: CPT

## 2024-08-11 PROCEDURE — 1200000002 HC GENERAL ROOM WITH TELEMETRY DAILY

## 2024-08-11 PROCEDURE — 2500000005 HC RX 250 GENERAL PHARMACY W/O HCPCS: Performed by: STUDENT IN AN ORGANIZED HEALTH CARE EDUCATION/TRAINING PROGRAM

## 2024-08-11 PROCEDURE — 85025 COMPLETE CBC W/AUTO DIFF WBC: CPT

## 2024-08-11 ASSESSMENT — PAIN SCALES - GENERAL
PAINLEVEL_OUTOF10: 8
PAINLEVEL_OUTOF10: 5 - MODERATE PAIN
PAINLEVEL_OUTOF10: 7
PAINLEVEL_OUTOF10: 6
PAINLEVEL_OUTOF10: 0 - NO PAIN

## 2024-08-11 ASSESSMENT — COGNITIVE AND FUNCTIONAL STATUS - GENERAL
TOILETING: A LOT
WALKING IN HOSPITAL ROOM: A LOT
TURNING FROM BACK TO SIDE WHILE IN FLAT BAD: A LITTLE
CLIMB 3 TO 5 STEPS WITH RAILING: TOTAL
MOBILITY SCORE: 14
MOVING TO AND FROM BED TO CHAIR: A LOT
DAILY ACTIVITIY SCORE: 18
HELP NEEDED FOR BATHING: A LOT
STANDING UP FROM CHAIR USING ARMS: A LOT
DRESSING REGULAR LOWER BODY CLOTHING: A LITTLE
DRESSING REGULAR UPPER BODY CLOTHING: A LITTLE

## 2024-08-11 ASSESSMENT — PAIN DESCRIPTION - LOCATION
LOCATION: BACK
LOCATION: NECK

## 2024-08-11 ASSESSMENT — PAIN - FUNCTIONAL ASSESSMENT
PAIN_FUNCTIONAL_ASSESSMENT: 0-10
PAIN_FUNCTIONAL_ASSESSMENT: 0-10

## 2024-08-11 NOTE — CARE PLAN
The patient's goals for the shift include  adequate pain management    The clinical goals for the shift include pain controlled. pt to remains free from falls.

## 2024-08-11 NOTE — CARE PLAN
The patient's goals for the shift include      The clinical goals for the shift include pain to be well controlled. nursing care maintained. pt remain free from falls.    Pts pain was controlled throughout shift.

## 2024-08-11 NOTE — PROGRESS NOTES
Subjective   Kassandra Veras is a 58 y.o. female on day 11 of admission   No acute events overnight    Objective   Physical Exam  AOx3  RUE D5 B5 T4+ HG/IO 4+  RLE HF4+ KE5 DF/PF 5  LUE D5 B5 T5 HG/IO 5  LLE HF4+ KE5 DF/PF 5  SILT    Assessment/Plan   Kassandra is a 58 y.o. female with h/o HTN, HLD, depression, anxiety p/w hypotension and tachycardia, RUQ US IVC thrombus, R renal mass, CT PE L perihilar mass encasing L pulmonary artery, CT AP b/l adrenal masses, R adrenal mass w/ extension into infrahepatic IVC, T8 compression fracture w 50% height loss, ventral epidural lesion, R iliac osteolytic lesion, MRI brain neg, MRI C/L neg, MRI T spine T8 lesion w sig epidural extension, 8/2 s/p EBUS, 8/3 TTE 60-65% EF    8/9 s/p T7-T9 lami, T8 bitranspedicular decompression for tumor debulking, T6-10 instrumented fusion (prelim: carcinoma)  8/10 uprights hardware in pos'n    PLAN  tele  Continue drain  Heparin gtt restart POD3  Final path  Supportive onc recs  Endo recs  Vasc med recs  PTOT-needs evaluated    Dispo pending anticoagulation restart, PTOT, will remove drain at discharge      Carole Lockwood MD

## 2024-08-11 NOTE — CARE PLAN
The patient's goals for the shift include      The clinical goals for the shift include pain to be well controlled. nursing care maintained. pt remain free from falls.    Over the shift, the patient did make progress towards goals.

## 2024-08-12 LAB
ANION GAP SERPL CALC-SCNC: 13 MMOL/L (ref 10–20)
APTT PPP: 35 SECONDS (ref 27–38)
BUN SERPL-MCNC: 15 MG/DL (ref 6–23)
CALCIUM SERPL-MCNC: 7.4 MG/DL (ref 8.6–10.6)
CHLORIDE SERPL-SCNC: 102 MMOL/L (ref 98–107)
CO2 SERPL-SCNC: 26 MMOL/L (ref 21–32)
CREAT SERPL-MCNC: 0.92 MG/DL (ref 0.5–1.05)
EGFRCR SERPLBLD CKD-EPI 2021: 71 ML/MIN/1.73M*2
ERYTHROCYTE [DISTWIDTH] IN BLOOD BY AUTOMATED COUNT: 15.9 % (ref 11.5–14.5)
GLUCOSE SERPL-MCNC: 93 MG/DL (ref 74–99)
HCT VFR BLD AUTO: 24 % (ref 36–46)
HGB BLD-MCNC: 7.6 G/DL (ref 12–16)
INR PPP: 1.3 (ref 0.9–1.1)
MCH RBC QN AUTO: 26.9 PG (ref 26–34)
MCHC RBC AUTO-ENTMCNC: 31.7 G/DL (ref 32–36)
MCV RBC AUTO: 85 FL (ref 80–100)
NRBC BLD-RTO: 0 /100 WBCS (ref 0–0)
PLATELET # BLD AUTO: 522 X10*3/UL (ref 150–450)
POTASSIUM SERPL-SCNC: 4.3 MMOL/L (ref 3.5–5.3)
PROTHROMBIN TIME: 14.7 SECONDS (ref 9.8–12.8)
RBC # BLD AUTO: 2.83 X10*6/UL (ref 4–5.2)
SODIUM SERPL-SCNC: 137 MMOL/L (ref 136–145)
UFH PPP CHRO-ACNC: 0.2 IU/ML
UFH PPP CHRO-ACNC: 0.2 IU/ML
UFH PPP CHRO-ACNC: 0.3 IU/ML
WBC # BLD AUTO: 12.5 X10*3/UL (ref 4.4–11.3)

## 2024-08-12 PROCEDURE — 2500000001 HC RX 250 WO HCPCS SELF ADMINISTERED DRUGS (ALT 637 FOR MEDICARE OP): Performed by: STUDENT IN AN ORGANIZED HEALTH CARE EDUCATION/TRAINING PROGRAM

## 2024-08-12 PROCEDURE — 2500000005 HC RX 250 GENERAL PHARMACY W/O HCPCS: Performed by: STUDENT IN AN ORGANIZED HEALTH CARE EDUCATION/TRAINING PROGRAM

## 2024-08-12 PROCEDURE — 2500000004 HC RX 250 GENERAL PHARMACY W/ HCPCS (ALT 636 FOR OP/ED)

## 2024-08-12 PROCEDURE — 85520 HEPARIN ASSAY: CPT

## 2024-08-12 PROCEDURE — 1200000002 HC GENERAL ROOM WITH TELEMETRY DAILY

## 2024-08-12 PROCEDURE — 99232 SBSQ HOSP IP/OBS MODERATE 35: CPT | Performed by: NURSE PRACTITIONER

## 2024-08-12 PROCEDURE — 97116 GAIT TRAINING THERAPY: CPT | Mod: GP

## 2024-08-12 PROCEDURE — 85730 THROMBOPLASTIN TIME PARTIAL: CPT

## 2024-08-12 PROCEDURE — 99233 SBSQ HOSP IP/OBS HIGH 50: CPT | Performed by: INTERNAL MEDICINE

## 2024-08-12 PROCEDURE — 85610 PROTHROMBIN TIME: CPT

## 2024-08-12 PROCEDURE — 80048 BASIC METABOLIC PNL TOTAL CA: CPT | Performed by: STUDENT IN AN ORGANIZED HEALTH CARE EDUCATION/TRAINING PROGRAM

## 2024-08-12 PROCEDURE — 2500000004 HC RX 250 GENERAL PHARMACY W/ HCPCS (ALT 636 FOR OP/ED): Performed by: STUDENT IN AN ORGANIZED HEALTH CARE EDUCATION/TRAINING PROGRAM

## 2024-08-12 PROCEDURE — 2500000001 HC RX 250 WO HCPCS SELF ADMINISTERED DRUGS (ALT 637 FOR MEDICARE OP): Performed by: NURSE PRACTITIONER

## 2024-08-12 PROCEDURE — 97161 PT EVAL LOW COMPLEX 20 MIN: CPT | Mod: GP

## 2024-08-12 PROCEDURE — 85027 COMPLETE CBC AUTOMATED: CPT | Performed by: STUDENT IN AN ORGANIZED HEALTH CARE EDUCATION/TRAINING PROGRAM

## 2024-08-12 RX ORDER — AMOXICILLIN 250 MG
2 CAPSULE ORAL 2 TIMES DAILY
Status: DISCONTINUED | OUTPATIENT
Start: 2024-08-12 | End: 2024-08-16 | Stop reason: HOSPADM

## 2024-08-12 RX ORDER — HEPARIN SODIUM 10000 [USP'U]/100ML
0-4000 INJECTION, SOLUTION INTRAVENOUS CONTINUOUS
Status: DISCONTINUED | OUTPATIENT
Start: 2024-08-12 | End: 2024-08-14

## 2024-08-12 RX ORDER — HYDROXYZINE HYDROCHLORIDE 25 MG/1
25 TABLET, FILM COATED ORAL 2 TIMES DAILY PRN
Status: DISCONTINUED | OUTPATIENT
Start: 2024-08-12 | End: 2024-08-16 | Stop reason: HOSPADM

## 2024-08-12 ASSESSMENT — COGNITIVE AND FUNCTIONAL STATUS - GENERAL
MOVING TO AND FROM BED TO CHAIR: A LITTLE
STANDING UP FROM CHAIR USING ARMS: A LITTLE
DRESSING REGULAR UPPER BODY CLOTHING: A LITTLE
MOVING FROM LYING ON BACK TO SITTING ON SIDE OF FLAT BED WITH BEDRAILS: A LITTLE
MOBILITY SCORE: 14
MOVING TO AND FROM BED TO CHAIR: A LOT
DAILY ACTIVITIY SCORE: 18
DRESSING REGULAR LOWER BODY CLOTHING: A LITTLE
WALKING IN HOSPITAL ROOM: A LOT
TURNING FROM BACK TO SIDE WHILE IN FLAT BAD: A LITTLE
WALKING IN HOSPITAL ROOM: A LITTLE
MOBILITY SCORE: 17
TURNING FROM BACK TO SIDE WHILE IN FLAT BAD: A LITTLE
HELP NEEDED FOR BATHING: A LOT
STANDING UP FROM CHAIR USING ARMS: A LOT
CLIMB 3 TO 5 STEPS WITH RAILING: TOTAL
TOILETING: A LOT
CLIMB 3 TO 5 STEPS WITH RAILING: A LOT

## 2024-08-12 ASSESSMENT — PAIN SCALES - GENERAL
PAINLEVEL_OUTOF10: 10 - WORST POSSIBLE PAIN
PAINLEVEL_OUTOF10: 6
PAINLEVEL_OUTOF10: 9
PAINLEVEL_OUTOF10: 6
PAINLEVEL_OUTOF10: 9
PAINLEVEL_OUTOF10: 4
PAINLEVEL_OUTOF10: 0 - NO PAIN

## 2024-08-12 ASSESSMENT — PAIN DESCRIPTION - LOCATION
LOCATION: BACK

## 2024-08-12 ASSESSMENT — PAIN - FUNCTIONAL ASSESSMENT
PAIN_FUNCTIONAL_ASSESSMENT: 0-10

## 2024-08-12 NOTE — SIGNIFICANT EVENT
Rapid Response RN Note     08/12/24 0130   Onset Documentation   Location/Room [facility]  (LT [id number])   Pager Time [phone]   Arrival Time 0130   Primary Reason for Call Radar auto page  (score 6)     RADAR score 6 due to the following VS: T 36.5 °C; HR 84; RR 16; BP 95/62; SPO2 93%.     Reviewed above VS with bedside RN via phone.  Per chart review, orders to notify provider include SBP < 90 or SPO2 < 92%.  Bedside RN verified BP: 101/68.  Patient alert and oriented and without complaints.  Staff to page rapid response for any concerns or acute change in condition/VS.

## 2024-08-12 NOTE — SIGNIFICANT EVENT
Patient will follow up with Dr. Ward outpatient at  [facility] on 8/27.     Hematology will sign off at this time. Please do not hesitate to call back with any questions.    Orion Montiel MD  Medical Oncology Fellow  8/12/2024

## 2024-08-12 NOTE — PROGRESS NOTES
Met with patient and introduced myself as Care Coordinator and member of the discharge planning team.  Pt is s/p thoracic spine surgery. She plans to return home at time of discharge. She lives with her son and daughter-in-law. She was independent in ADL's prior to hospitalization. She has a cane and a walker. Her PCP is Dr Yuan. She uses [facility] in [place]. Care coordinator will continue to follow for home going needs.  Rosa Swanson RN

## 2024-08-12 NOTE — CARE PLAN
Problem: Pain  Goal: Takes deep breaths with improved pain control throughout the shift  Outcome: Progressing     Problem: Skin  Goal: Promote skin healing  Outcome: Progressing   The patient's goals for the shift include being able to rest in bed with no pain.     The clinical goals for the shift include Pt to have improved pain control throughout the shift.    Over the shift, the patient did make progress and was having improved pain control with deep breathing and medication. The pt had no s/sx of respiratory distress observed.

## 2024-08-12 NOTE — PROGRESS NOTES
Kassandra Veras is a 58 y.o. female on day 12 of admission, presenting with with IVC thrombus.   Medical history significant for depression, HLD, HTN, daily tobacco use (1ppd x45 years).  Presented to [facility] for further evaluation of outpatient imaging significant for right upper pole renal mass 10 x 6 x 7 cm, likely renal neoplasm, with extension into the IVC with thrombus measuring 4.5 x 2.4 x 2.8 cm.   Further evaluation showed suspected stage IV non-small cell lung cancer, and she is s/p LN bx 8/2/24 with surgical pathology report of malignant cells consistent with poorly differentiated non small cell carcinoma, as well as lytic destructive lesion with mild pathologic compression deformity T8 vertebral body with ventral epidural tumor involvement at T7-8.    Vascular Medicine Service consulted for anticoagulation recommendations for management of IVC thrombus.  Imaging negative for PE/DVT of BLE.  Completed JENNA 8/3/24 that showed an obstructive echodensity in the IVC near the junction of the RA which may be tumor thrombus vs bland thrombus.  Plan for outpatient PET scan to delineate thrombus.   NSGY consulted, and patient underwent T6-10 pedicle screw instrumentation, T7-9 laminectomy, bilateral T8 transpedicular decompression, partial T8 corpectomy, debulking of spinal tumor, allograft and posterolateral arthrodesis 8/9/24 with Dr. QI Fox.   Low intensity Heparin infusion restarted 8/12/24.      Subjective   Patient reports adequate postoperative pain management.   Denies CP, SOB or bleeding.      Objective   Started low intensity Heparin infusion 8/12/24.     Physical Exam  Resting in bed in NAD.  Back dressing dry and intact; Hemovac drain is in place with serosanguinous drainage  Respirations full and easy; breath sounds CTA all anterior lung fields; supplemental oxygen 2 liters/minute; RIJ line in place for Heparin infusion.   Normal heart sounds with regular rate/rhythm; cardiac monitor shows NSR; vital  "signs are stable  Extremities are warm to touch with palpable bilateral radial and DP pulses; no evidence of BLE edema; PIV; SCDs on BLE with functional machine  Patient is awake and alert, participates in conversation; pleasant demeanor    Last Recorded Vitals  Blood pressure 116/70, pulse 84, temperature 36.5 °C (97.7 °F), resp. rate 17, height 1.676 m (5' 6\"), weight 74.4 kg (164 lb), SpO2 97%.  Intake/Output last 3 Shifts:  I/O last 3 completed shifts:  In: 600 (8.1 mL/kg) [P.O.:600]  Out: 1025 (13.8 mL/kg) [Urine:900 (0.3 mL/kg/hr); Drains:125]  Dosing Weight: 74.4 kg     Relevant Results  Scheduled medications  acetaminophen, 650 mg, oral, q6h  atorvastatin, 40 mg, oral, Daily  citalopram, 20 mg, oral, Daily  cyclobenzaprine, 5 mg, oral, TID  lidocaine, 1 patch, transdermal, Daily  polyethylene glycol, 17 g, oral, BID  sennosides-docusate sodium, 2 tablet, oral, BID      Continuous medications  heparin, 0-4,000 Units/hr, Last Rate: 900 Units/hr (08/12/24 0843)      Results from last 7 days   Lab Units 08/12/24  0440 08/11/24  0558 08/10/24  0409   WBC AUTO x10*3/uL 12.5* 12.4* 12.0*   HEMOGLOBIN g/dL 7.6* 8.2* 8.5*   HEMATOCRIT % 24.0* 26.4* 27.4*   PLATELETS AUTO x10*3/uL 522* 551* 563*       Results from last 7 days   Lab Units 08/12/24  0440 08/11/24  0558 08/10/24  0409   SODIUM mmol/L 137 136 137   POTASSIUM mmol/L 4.3 4.3 4.6   CHLORIDE mmol/L 102 101 102   CO2 mmol/L 26 25 26   BUN mg/dL 15 14 11   CREATININE mg/dL 0.92 0.99 0.75   GLUCOSE mg/dL 93 77 80   CALCIUM mg/dL 7.4* 7.9* 7.1*   Estimated Creatinine Clearance: 67 mL/min (by C-G formula based on SCr of 0.92 mg/dL).      Results from last 7 days   Lab Units 08/12/24  0823 08/08/24 1959 08/08/24  0906 08/07/24  1802   APTT seconds 35 118*  --  134*   INR  1.3* 1.6* 1.3* 1.5*       Results from last 7 days   Lab Units 08/12/24  1307 08/08/24 1959 08/07/24  0730   ANTI XA UNFRACTIONATED IU/mL 0.3 0.5 0.6           Assessment/Plan   Principal " Problem:    Mass of upper lobe of left lung  Active Problems:    Hypokalemia    HTN (hypertension)    Chronic low back pain    Malignancy (Multi)    58 year old female with medical history as described above.   Presented to [facility] for further evaluation of findings of right renal mass with extension into the IVC.  Vascular Medicine Service following for anticoagulation recommendations for management of IVC thrombus.   Now POD#3 from T6-10 instrumentation, T7-9 laminectomy, bilateral T8 decompression, partial T8 corpectomy, and debulking of spinal tumor 8/9/24 with Dr. QI Fox.   Started low intensity Heparin infusion this morning 8/12/24.  Review of labs today shows continued downtrend of hemoglobin to 7.6 grams, stable platelets 522K, stable creatinine 0.92 with creatinine clearance 67 ml/minute. Anticardiolipin Ab and Beta 2 Glycoprotein AB are negative.    Recommendations:  ~CONTINUE low intensity Heparin infusion for now; follow nomogram to achieve therapeutic assay 0.3-0.6  ~Monitor closely for bleeding  ~When patient is stable on low intensity Heparin infusion for 48 hours, will determine home going anticoagulation plan.   ~Needs PET scan as outpatient to determine tumor thrombus vs. [place] thrombus in the IVC/RA  ~Outpatient follow up with Dr. Lisset White has been scheduled for 10/22 @ 2:30 [place] [address]     Scheduling line is [phone].  ~Tobacco cessation.     Plan of care discussed with Dr. Jeimy Hoff  Plan of care discussed with Ms. Christina SUBRAMANIAN from the NSGY Team    MARILYNN Soto  Vascular Medicine Service   Pager [phone]

## 2024-08-12 NOTE — PROGRESS NOTES
SUPPORTIVE AND PALLIATIVE ONCOLOGY INPATIENT FOLLOW-UP      SERVICE DATE: 24     SUBJECTIVE:  Interval Events:   S/pT6-T10 pedicle screw instrumentation, T7-T9 laminectomy, bilateral T8 transpedicular decompression, partial T8 corpectomy, debulking of epidural spinal tumor, allograft, posterolateral arthrodesis  Ambulated with walker     Pain Assessment:  Location:  Mid back  Duration: Constant  Characteristics:   Ratin increases to 10/10 intermittently   Descriptors: sharp   Aggravating: movement    Relieving: Analgesics positioning, resting   Interference with Function: A little    Opioid Use  Past 24 h prn opioid use:   Dilaudid 0.5 mg IV every 3 hours as needed.  Used 0 dose/24 hour  oxycodone IR 10 mg every 4 hours as needed.  Used 3 doses/24 hours= 30 mg oxycodone/24 hours= 37.5  mg OME  Oxycodone IR 5 mg every 4 hours as needed mild to moderate pain.  Used 0 dose/24 hours  Total 24h OME use: 37.5 mg OME    Note: OME calculations based on equianalgesic table below. Please note this table is based on best available evidence but conversions are still approximate. These are NOT opioid DOSES for individual patient use; this is equivalency information.  Drug Parenteral Enteral   Morphine 10 25   Oxycodone N/A 20   Hydromorphone 2 5   Fentanyl 0.15 N/A   Tramadol N/A 120   Citation: Camila PANIAGUA. Demystifying opioid conversion calculations: A guide for effective dosing, Second edition. [place]: [employer] of [employer]-System Pharmacists, 2018.    Symptom Assessment:    Nausea none  Constipation none.  Last bowel movement couple days ago  Shortness of breath none  Lack of appetite none  Difficulty Sleeping none    Information obtained from: chart review, interview of patient, and discussion with primary team  ______________________________________________________________________        OBJECTIVE:  Results from last 7 days   Lab Units 24  0440 24  0558 08/10/24  0409 24  1421   WBC  AUTO x10*3/uL 12.5* 12.4* 12.0* 16.5*   HEMOGLOBIN g/dL 7.6* 8.2* 8.5* 8.9*   HEMATOCRIT % 24.0* 26.4* 27.4* 26.8*   PLATELETS AUTO x10*3/uL 522* 551* 563* 493*   NEUTROS PCT AUTO %  --  70.2  --  74.5   LYMPHS PCT AUTO %  --  17.2  --  12.8   MONOS PCT AUTO %  --  8.1  --  7.4   EOS PCT AUTO %  --  1.7  --  1.0     Results from last 7 days   Lab Units 08/12/24  0440 08/11/24  0558 08/10/24  0409   SODIUM mmol/L 137 136 137   POTASSIUM mmol/L 4.3 4.3 4.6   CHLORIDE mmol/L 102 101 102   CO2 mmol/L 26 25 26   BUN mg/dL 15 14 11   CREATININE mg/dL 0.92 0.99 0.75   CALCIUM mg/dL 7.4* 7.9* 7.1*   GLUCOSE mg/dL 93 77 80     Estimated Creatinine Clearance: 67 mL/min (by C-G formula based on SCr of 0.92 mg/dL).     Scheduled medications  acetaminophen, 650 mg, oral, q6h  atorvastatin, 40 mg, oral, Daily  citalopram, 20 mg, oral, Daily  cyclobenzaprine, 5 mg, oral, TID  lidocaine, 1 patch, transdermal, Daily  polyethylene glycol, 17 g, oral, BID  sennosides-docusate sodium, 2 tablet, oral, BID      Continuous medications  heparin, 0-4,000 Units/hr, Last Rate: 900 Units/hr (08/12/24 0843)      PRN medications  bisacodyl, 10 mg, Daily PRN  dextrose, 12.5 g, q15 min PRN  dextrose, 25 g, q15 min PRN  glucagon, 1 mg, q15 min PRN  glucagon, 1 mg, q15 min PRN  HYDROmorphone, 0.5 mg, q3h PRN  hydrOXYzine HCL, 25 mg, BID PRN  naloxone, 0.2 mg, q5 min PRN  ondansetron, 4 mg, q8h PRN   Or  ondansetron, 4 mg, q8h PRN  oxyCODONE, 10 mg, q4h PRN  oxyCODONE, 5 mg, q4h PRN      }   PHYSICAL EXAMINATION:    Vital Signs:   Vital signs reviewed  Visit Vitals  /70   Pulse 87   Temp 36.5 °C (97.7 °F)   Resp 20      0-10 (Numeric) Pain Score: 9     Physical Exam  Vitals reviewed.   Constitutional:       General: She is not in acute distress.  HENT:      Head: Normocephalic and atraumatic.      Mouth/Throat:      Mouth: Mucous membranes are moist.   Eyes:      Extraocular Movements: Extraocular movements intact.      Conjunctiva/sclera:  Conjunctivae normal.      Pupils: Pupils are equal, round, and reactive to light.   Cardiovascular:      Rate and Rhythm: Normal rate and regular rhythm.      Heart sounds: Normal heart sounds.   Pulmonary:      Effort: Pulmonary effort is normal. No respiratory distress.   Abdominal:      General: Bowel sounds are normal. There is no distension.      Palpations: Abdomen is soft.   Musculoskeletal:         General: Normal range of motion.   Skin:     General: Skin is warm and dry.   Neurological:      General: No focal deficit present.      Mental Status: She is alert and oriented to person, place, and time.   Psychiatric:         Mood and Affect: Mood normal.         Behavior: Behavior normal.        ASSESSMENT/PLAN:  58 y.o. female with h/o depression, anxiety admitted with IVC and right renal vein thrombus. Work up showed significant metastatic disease in adrenal glands, spine, and left upper lobe lung, unclear origin, suspicion for primary lung cancer  s/p LN biopsy . Pt's  recently  of cancer and he underwent chem. Per team pts may prefer to avoid chemo and mentioning hospice. Supportive onc consulted for intro to services, and goc and pain management.   S/pT6-T10 pedicle screw instrumentation, T7-T9 laminectomy, bilateral T8 transpedicular decompression, partial T8 corpectomy, debulking of epidural spinal tumor, allograft, posterolateral arthrodesis        Neoplasm related back pain due to spinal mets and T8 compression fracture.  S/pT6-T10 pedicle screw instrumentation, T7-T9 laminectomy, bilateral T8 transpedicular decompression, partial T8 corpectomy, debulking of epidural spinal tumor, allograft, posterolateral arthrodesis.  Somatic.  Fair control   CT PE L perihilar mass encasing L pulmonary artery  CT AP b/l adrenal masses, R adrenal mass w/ extension into infrahepatic IVC, T8 compression fracture w 50% height loss, ventral epidural lesion, R iliac osteolytic lesion   MRI brain:parotid nodule  which could be cystic or necrotic versus primary parotid lesion, DJD  MRI spine: acute compression deformity T8 osseous mets pathologic fracture, extraosseous tumor at T8 resulting in severe spinal canal stenosis and compression of thoracic cord, extraosseous tumor at T7, T8, T9, diffuse spinal mets    OARRS/PDMP reviewed no aberrant behavior noted.consistent with  prescriptions/records and patient history   Pain is: cancer related pain  Type: somatic  Pain control: sub-optimally controlled  Home regimen:   Ibuprofen 200 mg 3 tablets/day  Intolerances/previously tried: None  Personalized pain goal: 4  Total OME usage for the past 24 hours: 37.5  mg ome     Continue Tylenol 975 mg p.o. every 8 hours scheduled  Continue Flexeril 5 mg p.o. 3 times daily  Continue continue Dilaudid 0.5 mg IV every 3 hours as needed.  Used 0 dose/24 hour  Continue oxycodone IR 10 mg every 4 hours as needed.  Used 3 doses/24 hours= 30 mg oxycodone/24 hours= 37.5  mg OME  Continue oxycodone IR 5 mg every 4 hours as needed mild to moderate pain.  Used 0 dose/24 hours  Continue to monitor pain scores and administer PRN medications as appropriate  Continue/initiate nonpharmacologic pain management strategies including ice/heat therapy, distraction techniques, deep breathing/relaxation techniques, calming music, and repositioning  Continue to monitor for signs of opioid efficacy (pain scores, improved functionality) and toxicity (pinpoint pupils, excess sedation/drowsiness/confusion, respiratory depression, etc.)     At risk for nausea without vomiting related to opioids Well-controlled  Home regimen:  none  Continue Zofran 4 mg IV/p.o. every 8 hours as needed     At risk for constipation related to opioids, currently not constipated  Usual bowel pattern: every other day  Home regimen: none  LBM couple days ago.  When  Warm water  Prune juice  Encourage mobility as tolerated, PT/OT following   Monitor BM frequency, adjust regimen as  needed  Goal to have BM without straining q48-72h     Altered Mood:  Chronic  anxiety and depression controlled with home regimen   Home regimen:  Celexa 20 mg p.o. daily, Atarax 25 mg p.o. daily  Continue Celexa 20 mg p.o. daily  Continue Atarax 25 mg p.o. daily      Medical Decision Making/Goals of Care/Advance Care Planning:  Per my prior conversation on 2024  Life limiting disease: metastatic malignancy  Family: .    in .  Has 2 adult daughters and 2 adult sons.  Lives with son and his family.  1 daughter lives out of town and other 2 children live close by an  Performance status independent with ADLs and needs assistance with IADLs   Joys/meaning/strength: Tejinder understands that she has mets in the spine  Understanding of health:  understands that she has metastatic disease in the lungs and spine and chemotherapy may not be helpful.  She understands that surgery is high risk and she will need time to recover.  She understands chemotherapy is not curable.  She would like to know the results of the biopsy and if the cancer treatment then she will consider chemotherapy however if it is not treatable with chemotherapy then she would like to enroll in hospice  Information:Wants full disclosure  Goals: symptom control she would also like to know the results of the biopsy to help further medical decision making regarding treatment  Worries and fears now and future: none   Code status discussion:  We specifically discussed code status.  I explained that I have this conversation with all my patients so that their wishes can be respected in case of emergency and also to ease the burden on caregivers to make those decisions in the emergency.I explained that since she is in the hospital, it is important to discuss wishes for care during times when she is unable to tell us, so that we can provide the care that she she would want nerissa in the circumstance that she were to become very  sick and her heart were to stop.  We discussed her wishes regarding intubation/CPR in case of cardiopulmonary arrest. We discussed risks of CPR.  She stated that she wanted to be full code for now but doesn't want to be in a vegetative state with brain not functioning, not being aware of surroundings and not being able to communicate. This wouldn't be quality of life and would only cause suffering.  However she wants to discuss further with her family and get back to the primary team     Outcome of the conversation and documents completed: Consult  for advanced directive paperwork.  Patient will talk to family regarding CODE STATUS    Advance Directives  Existence of Advance Directives:No - has interest  Decision maker: Surrogate decision maker is oldest son with whom she lives  Code Status: Full code    Supportive Interventions: Interventions: Music Therapy: referral placed, Art Therapy: referral placed, [facility] Spiritual Care: referral placed     Disposition:  Please  start the process of having prior authorization with meds to beds deliver medications to patient prior to discharge via [facility]. Prescriptions will need to be sent 48-72 hours prior to discharge so that a prior authorization can be completed.      Discharge date: unknown pending acute issues and pain control  Will assess if patient needs an appointment with Outpatient Supportive Oncology as appropriate     SIGNATURE AND BILLING:     High Complexity   Today, I am treating the patient for acute on chronic pain which is in moderate exacerbation     Extensive DATA   Reviewed records from the following unique sources:  providers from oncology and primary services .  Diagnostic tests and information reviewed for today's visit:  Most recent labs results, Medications  Independently interpreted test CBC, CMP leukocytosis, anemia, thrombocytosis  Discussed plan of Care/management of pain with: Provider via shared electronic medical  record/secure chat/email or face-to-face.     High risk of morbidity from additional diagnostic testing or treatment  The patient is on parenteral controlled substances: Dilaudid IV as needed as above    Thank you for asking Supportive and Palliative Oncology to assist with care of this patient. Recommendations will be communicated back to the consulting service by way of shared electronic medical record/secure chat/email or face-to-face.  We will continue to follow.  Please contact us for additional questions or concerns.    SIGNATURE: Halina Pham MD   PAGER/CONTACT:  Contact information:  [facility]ive [facility] Pall[facility] Oncology  Monday-Friday 8 AM-5 PM  Epic Secure chat or pager [phone].  After hours and weekends:  pager [phone]

## 2024-08-12 NOTE — PROGRESS NOTES
Physical Therapy    Physical Therapy Evaluation & Treatment    Patient Name: Kassandra Veras  MRN: [id number]  Today's Date: 8/12/2024   Time Calculation  Start Time: 0955  Stop Time: 1023  Time Calculation (min): 28 min    Assessment/Plan   PT Assessment  PT Assessment Results: Decreased strength, Impaired balance, Decreased mobility, Pain  Rehab Prognosis: Good  Evaluation/Treatment Tolerance: Patient tolerated treatment well  Medical Staff Made Aware: Yes  Strengths: Housing layout, Support of Caregivers, Premorbid level of function  End of Session Communication: Bedside nurse, Physician  Assessment Comment: 59 yo female, typ Ind with no AD.  Now s/p thoracic surgery, limited by pain and deficits in strength and balance, requiring Close supv to mobilize with FWW.  Would benefit from cont PT while in hospital to address deficits and facilitate returnto PLOF.  End of Session Patient Position: Bed, 3 rail up, Alarm off, not on at start of session   IP OR SWING BED PT PLAN  Inpatient or Swing Bed: Inpatient  PT Plan  PT Plan: Ongoing PT  PT Frequency: Daily  PT Discharge Recommendations: Low intensity level of continued care  PT Recommended Transfer Status: Assist x1, Assistive device  PT - OK to Discharge: Yes (Pt has been evaluated and d/c recc is in place)    Subjective     General Visit Information:  General  Reason for Referral: T spine T8 lesion s/p  T7-T9 lami, T8 bitranspedicular decompression for tumor debulking, T6-10 instrumented fusion on 8/9  Past Medical History Relevant to Rehab: HTN, HLD, depression/anxiety.  Presented 7/31  hypotension and tachycardia, RUQ US IVC thrombus, R renal mass, CT PE L perihilar mass, CT AP b/l adrenal masses, R adrenal mass , T8 compression fracture w 50% height loss, ventral epidural lesion, R iliac osteolytic lesion  Missed Visit: No  Missed Visit Reason:  (-)  Family/Caregiver Present: No  Prior to Session Communication: Bedside nurse, Physician  Patient Position Received:  Bed, 3 rail up, Alarm off, not on at start of session  Preferred Learning Style: visual, written  General Comment: Supine.  Pt pleasant, cooperative and agreeable to PT.  Able to perform transfers and ambulate into hallway today with  close supv.  Tolerated well.  Home Living:  Home Living  Type of Home: House  Lives With: Adult children  Home Adaptive Equipment: Cane (& rollator)  Home Layout: One level (with basement, but pt does not go down there)  Home Access: Stairs to enter without rails  Entrance Stairs-Number of Steps: 5  Prior Level of Function:  Prior Function Per Pt/Caregiver Report  Level of [place]: Independent with ADLs and functional transfers  Ambulatory Assistance: Independent (community ambulator with no AD (has equipment from her  that passed away))  Precautions:  Precautions  Medical Precautions: Fall precautions, Oxygen therapy device and L/min  Post-Surgical Precautions: Spinal precautions  Precautions Comment: Issued and reviewed HOs for log roll and spinal precautions  Vital Signs:  Vital Signs  Heart Rate: 84  SpO2: 99 %  BP: 105/76    Objective   Pain:  Pain Assessment  Pain Assessment: 0-10  0-10 (Numeric) Pain Score: 4 (Up to 7 with mobility, but improves once in position.)  Pain Type: Surgical pain  Pain Location: Back  Pain Interventions: Relaxation technique, Repositioned  Cognition:  Cognition  Overall Cognitive Status: Within Functional Limits  Orientation Level: Oriented X4    General Assessments:    Activity Tolerance  Endurance: Tolerates 10 - 20 min exercise with multiple rests    Sensation  Light Touch: No apparent deficits    Strength  Strength Comments: BLE hip  3+/5, knee/ankle 4/5  Strength  Strength Comments: BLE hip  3+/5, knee/ankle 4/5    Perception  Inattention/Neglect: Appears intact      Coordination  Movements are Fluid and Coordinated: Yes    Postural Control  Postural Control:  (kyphotic with forward head, some improvement with VCs)    Static Sitting  Balance  Static Sitting-Level of Assistance: Close supervision    Static Standing Balance  Static Standing-Level of Assistance: Close supervision (with FWW)  Functional Assessments:     Bed Mobility  Bed Mobility: Yes  Bed Mobility 1  Bed Mobility 1: Supine to sitting, Sitting to supine, Log roll  Level of Assistance 1: Close supervision, Moderate verbal cues    Transfers  Transfer: Yes  Transfer 1  Transfer From 1: Sit to, Stand to  Transfer to 1: Sit  Transfer Device 1: Walker  Transfer Level of Assistance 1: Close supervision, Moderate verbal cues    Ambulation/Gait Training  Ambulation/Gait Training Performed: Yes  Ambulation/Gait Training 1  Surface 1: Level tile, Carpet  Device 1: Rolling walker  Quality of Gait 1: Wide base of support, Inconsistent stride length, Decreased step length, Soft knee(s), Foot sweep  Comments/Distance (ft) 1: 2x 40 feet    Extremity/Trunk Assessments:    RLE   RLE : Within Functional Limits  LLE   LLE : Within Functional Limits  Treatments:    Bed Mobility  Bed Mobility: Yes  Bed Mobility 1  Bed Mobility 1: Supine to sitting, Sitting to supine, Log roll  Level of Assistance 1: Close supervision, Moderate verbal cues    Ambulation/Gait Training  Ambulation/Gait Training Performed: Yes  Ambulation/Gait Training 1  Surface 1: Level tile, Carpet  Device 1: Rolling walker  Quality of Gait 1: Wide base of support, Inconsistent stride length, Decreased step length, Soft knee(s), Foot sweep  Comments/Distance (ft) 1: 2x 40 feet  Transfers  Transfer: Yes  Transfer 1  Transfer From 1: Sit to, Stand to  Transfer to 1: Sit  Transfer Device 1: Walker  Transfer Level of Assistance 1: Close supervision, Moderate verbal cues    Outcome Measures:    [facility] Basic Mobility  Turning from your back to your side while in a flat bed without using bedrails: A little  Moving from lying on your back to sitting on the side of a flat bed without using bedrails: A little  Moving to and from bed to chair  (including a wheelchair): A little  Standing up from a chair using your arms (e.g. wheelchair or bedside chair): A little  To walk in hospital room: A little  Climbing 3-5 steps with railing: A lot  Basic Mobility - Total Score: 17    Encounter Problems       Encounter Problems (Active)       Balance       Tin[place]>24 to reflect improvement in balance and decreased falls risk  (Progressing)       Start:  08/12/24    Expected End:  08/26/24               Mobility       Ambulate >150 feet, LRD, Ind  (Progressing)       Start:  08/12/24    Expected End:  08/26/24            up/dn 5 stairs, Ind (Progressing)       Start:  08/12/24    Expected End:  08/26/24               PT Transfers       Pt able to perform bed mobility Ind from a flat bed without rail using Log Roll  (Progressing)       Start:  08/12/24    Expected End:  08/26/24            sit<>stand, bed<>chair, LRD, Ind  (Progressing)       Start:  08/12/24    Expected End:  08/26/24                   Education Documentation  No documentation found.  Education Comments  No comments found.

## 2024-08-12 NOTE — PROGRESS NOTES
Subjective   Kassandra Masker is a 58 y.o. female on day 12 of admission   No acute events overnight, feeling better and lying comfortably in bed this AM breathing on RA.     Objective   Physical Exam  AOx3  RUE D5 B5 T4+ HG/IO 4+  RLE HF4+ KE5 DF/PF 5  LUE D5 B5 T5 HG/IO 5  LLE HF4+ KE5 DF/PF 5  SILT    Assessment/Plan   Kassandra is a 58 y.o. female with h/o HTN, HLD, depression, anxiety p/w hypotension and tachycardia, RUQ US IVC thrombus, R renal mass, CT PE L perihilar mass encasing L pulmonary artery, CT AP b/l adrenal masses, R adrenal mass w/ extension into infrahepatic IVC, T8 compression fracture w 50% height loss, ventral epidural lesion, R iliac osteolytic lesion, MRI brain neg, MRI C/L neg, MRI T spine T8 lesion w sig epidural extension, 8/2 s/p EBUS, 8/3 TTE 60-65% EF    8/9 s/p T7-T9 lami, T8 bitranspedicular decompression for tumor debulking, T6-10 instrumented fusion (prelim: carcinoma)  8/10 uprights hardware in pos'n    PLAN  tele  Continue drain  Start Heparin gtt today 8/12  Follow up Final OR path from 8/9  Supportive onc recs  Endo recs  Vasc med recs  PTOT-needs evaluated    Dispo pending anticoagulation restart, PTOT, will remove drain at discharge      Maninder Greer MD

## 2024-08-13 LAB
ALBUMIN SERPL BCP-MCNC: 1.9 G/DL (ref 3.4–5)
ANION GAP SERPL CALC-SCNC: 11 MMOL/L (ref 10–20)
ATRIAL RATE: 81 BPM
BUN SERPL-MCNC: 12 MG/DL (ref 6–23)
CALCIUM SERPL-MCNC: 7.4 MG/DL (ref 8.6–10.6)
CHLORIDE SERPL-SCNC: 101 MMOL/L (ref 98–107)
CO2 SERPL-SCNC: 27 MMOL/L (ref 21–32)
CREAT SERPL-MCNC: 0.75 MG/DL (ref 0.5–1.05)
EGFRCR SERPLBLD CKD-EPI 2021: >90 ML/MIN/1.73M*2
ELECTRONICALLY SIGNED BY: NORMAL
ERYTHROCYTE [DISTWIDTH] IN BLOOD BY AUTOMATED COUNT: 15.9 % (ref 11.5–14.5)
FOCUSED SOLID TUMOR DNA/RNA RESULTS: NORMAL
GLUCOSE SERPL-MCNC: 92 MG/DL (ref 74–99)
HCT VFR BLD AUTO: 22.7 % (ref 36–46)
HGB BLD-MCNC: 7.4 G/DL (ref 12–16)
MCH RBC QN AUTO: 26.9 PG (ref 26–34)
MCHC RBC AUTO-ENTMCNC: 32.6 G/DL (ref 32–36)
MCV RBC AUTO: 83 FL (ref 80–100)
NRBC BLD-RTO: 0.2 /100 WBCS (ref 0–0)
P AXIS: 33 DEGREES
P OFFSET: 196 MS
P ONSET: 146 MS
PHOSPHATE SERPL-MCNC: 4.9 MG/DL (ref 2.5–4.9)
PLATELET # BLD AUTO: 543 X10*3/UL (ref 150–450)
POTASSIUM SERPL-SCNC: 4.4 MMOL/L (ref 3.5–5.3)
PR INTERVAL: 154 MS
Q ONSET: 223 MS
QRS COUNT: 13 BEATS
QRS DURATION: 84 MS
QT INTERVAL: 386 MS
QTC CALCULATION(BAZETT): 448 MS
QTC FREDERICIA: 426 MS
R AXIS: 69 DEGREES
RBC # BLD AUTO: 2.75 X10*6/UL (ref 4–5.2)
SODIUM SERPL-SCNC: 135 MMOL/L (ref 136–145)
T AXIS: 55 DEGREES
T OFFSET: 416 MS
UFH PPP CHRO-ACNC: 0.2 IU/ML
UFH PPP CHRO-ACNC: 0.3 IU/ML
UFH PPP CHRO-ACNC: 0.3 IU/ML
VENTRICULAR RATE: 81 BPM
WBC # BLD AUTO: 14.9 X10*3/UL (ref 4.4–11.3)

## 2024-08-13 PROCEDURE — 99232 SBSQ HOSP IP/OBS MODERATE 35: CPT | Performed by: NURSE PRACTITIONER

## 2024-08-13 PROCEDURE — 85027 COMPLETE CBC AUTOMATED: CPT | Performed by: NURSE PRACTITIONER

## 2024-08-13 PROCEDURE — 2500000004 HC RX 250 GENERAL PHARMACY W/ HCPCS (ALT 636 FOR OP/ED): Performed by: STUDENT IN AN ORGANIZED HEALTH CARE EDUCATION/TRAINING PROGRAM

## 2024-08-13 PROCEDURE — 2500000004 HC RX 250 GENERAL PHARMACY W/ HCPCS (ALT 636 FOR OP/ED)

## 2024-08-13 PROCEDURE — 97116 GAIT TRAINING THERAPY: CPT | Mod: GP,CQ

## 2024-08-13 PROCEDURE — 80069 RENAL FUNCTION PANEL: CPT | Performed by: NURSE PRACTITIONER

## 2024-08-13 PROCEDURE — 97530 THERAPEUTIC ACTIVITIES: CPT | Mod: GP,CQ

## 2024-08-13 PROCEDURE — 2500000005 HC RX 250 GENERAL PHARMACY W/O HCPCS: Performed by: STUDENT IN AN ORGANIZED HEALTH CARE EDUCATION/TRAINING PROGRAM

## 2024-08-13 PROCEDURE — 85520 HEPARIN ASSAY: CPT

## 2024-08-13 PROCEDURE — 97165 OT EVAL LOW COMPLEX 30 MIN: CPT | Mod: GO

## 2024-08-13 PROCEDURE — 2500000001 HC RX 250 WO HCPCS SELF ADMINISTERED DRUGS (ALT 637 FOR MEDICARE OP): Performed by: STUDENT IN AN ORGANIZED HEALTH CARE EDUCATION/TRAINING PROGRAM

## 2024-08-13 PROCEDURE — 97535 SELF CARE MNGMENT TRAINING: CPT | Mod: GO

## 2024-08-13 PROCEDURE — 2500000001 HC RX 250 WO HCPCS SELF ADMINISTERED DRUGS (ALT 637 FOR MEDICARE OP): Performed by: NURSE PRACTITIONER

## 2024-08-13 PROCEDURE — 99233 SBSQ HOSP IP/OBS HIGH 50: CPT | Performed by: INTERNAL MEDICINE

## 2024-08-13 PROCEDURE — 1100000001 HC PRIVATE ROOM DAILY

## 2024-08-13 ASSESSMENT — PAIN DESCRIPTION - LOCATION
LOCATION: BACK

## 2024-08-13 ASSESSMENT — COGNITIVE AND FUNCTIONAL STATUS - GENERAL
MOBILITY SCORE: 17
STANDING UP FROM CHAIR USING ARMS: A LITTLE
MOBILITY SCORE: 17
CLIMB 3 TO 5 STEPS WITH RAILING: A LOT
CLIMB 3 TO 5 STEPS WITH RAILING: A LOT
DAILY ACTIVITIY SCORE: 18
DRESSING REGULAR LOWER BODY CLOTHING: A LITTLE
PERSONAL GROOMING: A LITTLE
WALKING IN HOSPITAL ROOM: A LITTLE
TURNING FROM BACK TO SIDE WHILE IN FLAT BAD: A LITTLE
WALKING IN HOSPITAL ROOM: A LITTLE
STANDING UP FROM CHAIR USING ARMS: A LITTLE
MOVING FROM LYING ON BACK TO SITTING ON SIDE OF FLAT BED WITH BEDRAILS: A LITTLE
MOVING TO AND FROM BED TO CHAIR: A LITTLE
MOVING FROM LYING ON BACK TO SITTING ON SIDE OF FLAT BED WITH BEDRAILS: A LITTLE
MOVING TO AND FROM BED TO CHAIR: A LITTLE
HELP NEEDED FOR BATHING: A LITTLE
DRESSING REGULAR UPPER BODY CLOTHING: A LITTLE
TURNING FROM BACK TO SIDE WHILE IN FLAT BAD: A LITTLE
TOILETING: A LOT

## 2024-08-13 ASSESSMENT — PAIN SCALES - GENERAL
PAINLEVEL_OUTOF10: 7
PAINLEVEL_OUTOF10: 10 - WORST POSSIBLE PAIN
PAINLEVEL_OUTOF10: 10 - WORST POSSIBLE PAIN
PAINLEVEL_OUTOF10: 6
PAINLEVEL_OUTOF10: 3
PAINLEVEL_OUTOF10: 10 - WORST POSSIBLE PAIN
PAINLEVEL_OUTOF10: 3
PAINLEVEL_OUTOF10: 6
PAINLEVEL_OUTOF10: 5 - MODERATE PAIN
PAINLEVEL_OUTOF10: 0 - NO PAIN
PAINLEVEL_OUTOF10: 7
PAINLEVEL_OUTOF10: 8

## 2024-08-13 ASSESSMENT — ACTIVITIES OF DAILY LIVING (ADL)
BATHING_ASSISTANCE: MINIMAL
ADL_ASSISTANCE: INDEPENDENT
HOME_MANAGEMENT_TIME_ENTRY: 8

## 2024-08-13 ASSESSMENT — PAIN DESCRIPTION - ORIENTATION
ORIENTATION: MID
ORIENTATION: MID

## 2024-08-13 ASSESSMENT — PAIN - FUNCTIONAL ASSESSMENT
PAIN_FUNCTIONAL_ASSESSMENT: 0-10

## 2024-08-13 NOTE — PROGRESS NOTES
SUPPORTIVE AND PALLIATIVE ONCOLOGY INPATIENT FOLLOW-UP      SERVICE DATE: 24     SUBJECTIVE:  Interval Events:    Pain Assessment:  Location:  Mid back  Duration: Constant  Characteristics:              Ratin               Descriptors: sharp              Aggravating: movement               Relieving: Analgesics positioning, resting              Interference with Function: A little    Opioid Use  Past 24 h prn opioid use:   Dilaudid 0.5 mg IV every 3 hours as needed.  Use 1 dose/24 hours= 6.25 mg OME  Oxycodone IR 5 mg p.o. every 4 hours as needed.  Used 0 dose/24 hours  Oxycodone IR 10 mg p.o. every 4 hours as needed.  Used 3 doses/24 hours 30 mg oxycodone 37.5 mg OME  Total 24h OME use: 44 mg OME    Note: OME calculations based on equianalgesic table below. Please note this table is based on best available evidence but conversions are still approximate. These are NOT opioid DOSES for individual patient use; this is equivalency information.  Drug Parenteral Enteral   Morphine 10 25   Oxycodone N/A 20   Hydromorphone 2 5   Fentanyl 0.15 N/A   Tramadol N/A 120   Citation: Camila PANIAGUA. Demystifying opioid conversion calculations: A guide for effective dosing, Second edition. [place]: [employer] of [employer]-System Pharmacists, 2018.    Symptom Assessment:  Nausea none  Constipation none  Shortness of breath none  Lack of appetite none  Difficulty Sleeping none    Information obtained from: chart review, interview of patient, and discussion with primary team  ______________________________________________________________________        OBJECTIVE:    Lab Results   Component Value Date    WBC 14.9 (H) 2024    HGB 7.4 (L) 2024    HCT 22.7 (L) 2024    MCV 83 2024     (H) 2024      Lab Results   Component Value Date    GLUCOSE 92 2024    CALCIUM 7.4 (L) 2024     (L) 2024    K 4.4 2024    CO2 27 2024     2024    BUN 12  08/13/2024    CREATININE 0.75 08/13/2024     Lab Results   Component Value Date    ALT 11 07/31/2024    AST 14 07/31/2024    ALKPHOS 154 (H) 07/31/2024    BILITOT 0.6 07/31/2024     Estimated Creatinine Clearance: 82.2 mL/min (by C-G formula based on SCr of 0.75 mg/dL).     Radiology  X-ray thoracic spine 8/10/2024  Status post posterior instrumented fusion with laminectomies of   T6-T10 without evidence of hardware complication.   2. Osteolytic appearance of T8 vertebral body compatible with known   marrow replacing metastatic lesion better seen on prior MRI.     Scheduled medications  acetaminophen, 650 mg, oral, q6h  atorvastatin, 40 mg, oral, Daily  citalopram, 20 mg, oral, Daily  cyclobenzaprine, 5 mg, oral, TID  lidocaine, 1 patch, transdermal, Daily  polyethylene glycol, 17 g, oral, BID  sennosides-docusate sodium, 2 tablet, oral, BID      Continuous medications  heparin, 0-4,000 Units/hr, Last Rate: 1,500 Units/hr (08/13/24 1200)      PRN medications  bisacodyl, 10 mg, Daily PRN  dextrose, 12.5 g, q15 min PRN  dextrose, 25 g, q15 min PRN  glucagon, 1 mg, q15 min PRN  glucagon, 1 mg, q15 min PRN  HYDROmorphone, 0.5 mg, q3h PRN  hydrOXYzine HCL, 25 mg, BID PRN  naloxone, 0.2 mg, q5 min PRN  ondansetron, 4 mg, q8h PRN   Or  ondansetron, 4 mg, q8h PRN  oxyCODONE, 10 mg, q4h PRN  oxyCODONE, 5 mg, q4h PRN      }     PHYSICAL EXAMINATION:    Vital Signs:   Vital signs reviewed  Visit Vitals  BP 95/58 (Patient Position: Lying)   Pulse 86   Temp 36.4 °C (97.5 °F) (Temporal)   Resp 13        0-10 (Numeric) Pain Score: 10 - Worst possible pain       Physical Exam   Vitals reviewed.   Constitutional:       General: not in acute distress.  HENT:      Head: Normocephalic and atraumatic.      Mouth/Throat:      Mouth: Mucous membranes are moist.   Eyes:      Extraocular Movements: Extraocular movements intact.      Conjunctiva/sclera: Conjunctivae normal.      Pupils: Pupils are equal, round, and reactive to light.    Cardiovascular:      Rate and Rhythm: Normal rate and regular rhythm.      Heart sounds: Normal heart sounds.   Pulmonary:      Effort: Pulmonary effort is normal. No respiratory distress.   Abdominal:      General: Bowel sounds are normal. There is no distension.      Palpations: Abdomen is soft.   Musculoskeletal:         General: Normal range of motion.   Skin:     General: Skin is warm and dry.   Neurological:      General: No focal deficit present.      Mental Status: She is alert and oriented to person, place, and time.   Psychiatric:         Mood and Affect: Mood normal.         Behavior: Behavior normal.     ASSESSMENT/PLAN: 58 y.o. female with h/o depression, anxiety admitted with IVC and right renal vein thrombus. Work up showed significant metastatic disease in adrenal glands, spine, and left upper lobe lung, unclear origin, suspicion for primary lung cancer  s/p LN biopsy . Pt's  recently  of cancer and he underwent chem. Per team pt may prefer to avoid chemo and mentioning hospice. Supportive onc consulted for intro to services, and goc and pain management.   S/pT6-T10 pedicle screw instrumentation, T7-T9 laminectomy, bilateral T8 transpedicular decompression, partial T8 corpectomy, debulking of epidural spinal tumor, allograft, posterolateral arthrodesis     Acute postop on chronic neoplasm related back pain due to spinal mets and T8 compression fracture.  S/pT6-T10 pedicle screw instrumentation, T7-T9 laminectomy, bilateral T8 transpedicular decompression, partial T8 corpectomy, debulking of epidural spinal tumor, allograft, posterolateral arthrodesis.  Somatic.  Fair control   X-ray thoracic spine 8/10/2024: Status post posterior instrumented fusion with laminectomies of T6-T10 without evidence of hardware complication.  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 every 6 hours scheduled to 975 mg p.o. 3 times daily  Continue Flexeril 5 mg p.o. 3 times daily  Continue lidocaine patch  Continue Dilaudid 0.5 mg IV every 3 hours as needed breakthrough pain.  Use 1 dose/24 hours= 6.25 mg OME.  Will consider stopping in future in anticipation of discharge if pain remains controlled  Oxycodone IR 5 mg p.o. every 4 hours as needed mild to moderate pain.  Used 0 dose/24 hours  Oxycodone IR 10 mg p.o. every 4 hours as needed severe pain.  Used 3 doses/24 hours 30 mg oxycodone 37.5 mg OME  Continue to monitor pain scores and administer PRN medications as appropriate  Continue/initiate nonpharmacologic pain management strategies including ice/heat therapy, distraction techniques, deep breathing/relaxation techniques, calming music, and repositioning  Continue to monitor for signs of opioid efficacy (pain scores, improved functionality) and toxicity (pinpoint pupils, excess sedation/drowsiness/confusion, respiratory depression, etc.)     At risk for nausea without vomiting related to opioids  Well-controlled  Home regimen:  none  Continue Zofran 4 mg IV/p.o. every 8 hours as needed.  Used 0 dose/24 hours     At risk for constipation related to opioids, currently not constipated  Usual bowel pattern: every other day  Home regimen: none  LBM yesterday  Continue Dulcolax 10 mg tablet po every day prn   Continue MiraLAX 17 g p.o. twice daily   Continue senna 2 tabs p.o. twice daily   Warm water  Prune juice  Encourage mobility as tolerated, PT/OT following   Monitor BM frequency, adjust regimen as needed  Goal to have BM without straining q48-72h     Altered Mood:  Chronic  anxiety and depression controlled with home regimen   Home regimen:  Celexa 20 mg p.o. daily, Atarax 25 mg p.o. daily  Continue Celexa 20 mg p.o. daily  Continue Atarax 25 mg p.o twice daily as needed      Medical Decision Making/Goals of Care/Advance Care Planning:  Per my prior conversation on 2024  Life limiting disease: metastatic malignancy  Family: .    in .  Has 2 adult daughters and 2 adult sons.  Lives with son and his family.  1 daughter lives out of town and other 2 children live close by an  Performance status independent with ADLs and needs assistance with IADLs   Joys/meaning/strength: Tejinder understands that she has mets in the spine  Understanding of health:  understands that she has metastatic disease in the lungs and spine and chemotherapy may not be helpful.  She understands that surgery is high risk and she will need time to recover.  She understands chemotherapy is not curable.  She would like to know the results of the biopsy and if the cancer treatment then she will consider chemotherapy however if it is not treatable with chemotherapy then she would like to enroll in hospice  Information:Wants full disclosure  Goals: symptom control she would also like to know the results of the biopsy to help further medical decision making regarding treatment  Worries and fears now and  future: none   Code status discussion:  We specifically discussed code status.  I explained that I have this conversation with all my patients so that their wishes can be respected in case of emergency and also to ease the burden on caregivers to make those decisions in the emergency.I explained that since she is in the hospital, it is important to discuss wishes for care during times when she is unable to tell us, so that we can provide the care that she she would want nerissa in the circumstance that she were to become very sick and her heart were to stop.  We discussed her wishes regarding intubation/CPR in case of cardiopulmonary arrest. We discussed risks of CPR.  She stated that she wanted to be full code for now but doesn't want to be in a vegetative state with brain not functioning, not being aware of surroundings and not being able to communicate. This wouldn't be quality of life and would only cause suffering.  However she wants to discuss further with her family and get back to the primary team     Outcome of the conversation and documents completed: Consult  for advanced directive paperwork.  Patient will talk to family regarding CODE STATUS     Advance Directives  Existence of Advance Directives:No - has interest  Decision maker: Surrogate decision maker is oldest son with whom she lives  Code Status: Full code     Supportive Interventions: Interventions: Music Therapy: referral placed, Art Therapy: referral placed, [facility] Spiritual Care: referral placed     Disposition:  Please  start the process of having prior authorization with meds to beds deliver medications to patient prior to discharge via [facility] pharmacy. Prescriptions will need to be sent 48-72 hours prior to discharge so that a prior authorization can be completed.      Discharge date: unknown pending acute issues and pain control  Will assess if patient needs an appointment with Outpatient Supportive Oncology as appropriate      SIGNATURE AND BILLING:     High Complexity   Today, I am treating the patient for acute on chronic pain which is in moderate exacerbation     Extensive DATA   Reviewed records from the following unique sources:  providers from oncology and primary services .  Diagnostic tests and information reviewed for today's visit:  Most recent labs results, Medications  Independently interpreted test CBC, CMP leukocytosis, anemia, thrombocytosis and x-ray thoracic spine: No evidence of evidence of hardware complication, known mets T8 vertebral body    Discussed plan of Care/management of pain with: Provider via shared electronic medical record/secure chat/email or face-to-face.     High risk of morbidity from additional diagnostic testing or treatment  The patient is on parenteral controlled substances: Dilaudid IV as needed as above    Thank you for asking Supportive and Palliative Oncology to assist with care of this patient. Recommendations will be communicated back to the consulting service by way of shared electronic medical record/secure chat/email or face-to-face.  We will continue to follow.  Please contact us for additional questions or concerns.    Medical Decision Making was high level due to high complexity of problems, extensive data review, and high risk of management/treatment.     Time:     I spent 50 minutes the care of this patient which included chart review, interviewing patient/family, discussion with primary team, coordination of care, and documentation.      SIGNATURE: Halina Pham MD   PAGER/CONTACT:  Contact information:  Supportive and Palliative Oncology  Monday-Friday 8 AM-5 PM  Epic Secure chat or pager [phone].  After hours and weekends:  pager [phone]

## 2024-08-13 NOTE — CARE PLAN
Problem: Pain  Goal: Turns in bed with improved pain control throughout the shift  Outcome: Progressing     Problem: Skin  Goal: Prevent/minimize sheer/friction injuries  Outcome: Progressing   The patient's goals for the shift include resting in bed the entire night.    The clinical goals for the shift include Pt to have improved pain control throughout the shift.    Over the shift, the patient had no s/sx of respiratory distress. The pt was turning and repositioning in bed with improved pain control.

## 2024-08-13 NOTE — PROGRESS NOTES
Physical Therapy    Physical Therapy Treatment    Patient Name: Kassandra Veras  MRN: [id number]  Today's Date: 8/13/2024  Time Calculation  Start Time: 1205  Stop Time: 1228  Time Calculation (min): 23 min    Assessment/Plan   PT Assessment  PT Assessment Results: Decreased strength, Decreased endurance, Impaired balance  Rehab Prognosis: Good  Evaluation/Treatment Tolerance: Patient tolerated treatment well  Medical Staff Made Aware: Yes  Strengths: Ability to acquire knowledge, Attitude of self, Support of Caregivers  Barriers to Participation: Comorbidities  End of Session Communication: Bedside nurse  Assessment Comment: Patient is making steady gains towards stated therapy goals and remains on track for safe d/c home with LOW intensity PT.  End of Session Patient Position: Bed, 3 rail up, Alarm off, not on at start of session     PT Plan  PT Plan: Ongoing PT  PT Frequency: Daily  PT Discharge Recommendations: Low intensity level of continued care  PT Recommended Transfer Status: Assist x1, Assistive device  PT - OK to Discharge: Yes (Pt has been evaluated and d/c recc is in place)      General Visit Information:   PT  Visit  PT Received On: 08/13/24  Response to Previous Treatment: Patient with no complaints from previous session.  General  Family/Caregiver Present: No  Prior to Session Communication: Bedside nurse  Patient Position Received: Bed, 3 rail up, Alarm off, not on at start of session  Preferred Learning Style: verbal, visual  General Comment: Pt supine in bed upon arrival, agreeable to PT    Subjective   Precautions:  Precautions  Medical Precautions: Fall precautions, Oxygen therapy device and L/min  Post-Surgical Precautions: Spinal precautions  Precautions Comment: 2L  Vital Signs:  Vital Signs  Heart Rate: 91  Heart Rate Source: Monitor  SpO2: 97 %  BP: 95/58  BP Method: Automatic  Patient Position: Lying    Objective   Pain:  Pain Assessment  Pain Assessment: 0-10  0-10 (Numeric) Pain Score: 6  Pain  Type: Surgical pain  Pain Location: Back  Cognition:  Cognition  Orientation Level: Oriented X4  Coordination:     Postural Control:  Static Sitting Balance  Static Sitting-Balance Support: Feet supported, Bilateral upper extremity supported  Static Sitting-Level of Assistance: Close supervision  Static Standing Balance  Static Standing-Balance Support: Bilateral upper extremity supported  Static Standing-Level of Assistance: Close supervision  Dynamic Standing Balance  Dynamic Standing-Balance Support: Bilateral upper extremity supported  Dynamic Standing-Balance: Turning  Dynamic Standing-Comments: cga x 1    Activity Tolerance:  Activity Tolerance  Endurance: Tolerates 10 - 20 min exercise with multiple rests  Treatments:  Therapeutic Exercise  Therapeutic Exercise Performed: Yes  Therapeutic Exercise Activity 1: standing lenard LE: heel raises x 10, marches in place x 12 total         Bed Mobility  Bed Mobility: Yes  Bed Mobility 1  Bed Mobility 1: Supine to sitting, Sitting to supine  Level of Assistance 1: Close supervision, Minimal verbal cues  Bed Mobility Comments 1: log roll    Ambulation/Gait Training  Ambulation/Gait Training Performed: Yes  Ambulation/Gait Training 1  Surface 1: Level tile  Device 1: Rolling walker  Assistance 1: Contact guard  Quality of Gait 1: Decreased step length  Comments/Distance (ft) 1: 10 ft, 5 ft  Ambulation/Gait Training 2  Surface 2: Level tile, Carpet  Device 2: Rolling walker  Assistance 2: Contact guard  Quality of Gait 2: Decreased step length  Comments/Distance (ft) 2: 50 ft  Transfers  Transfer: Yes  Transfer 1  Transfer From 1: Sit to, Stand to  Transfer to 1: Stand, Sit  Technique 1: Sit to stand, Stand to sit  Transfer Device 1: Walker  Transfer Level of Assistance 1: Contact guard, Minimal verbal cues  Trials/Comments 1: x2 trials (VCs for hand placement)    Outcome Measures:  [facility] Basic Mobility  Turning from your back to your side while in a flat bed without using  bedrails: A little  Moving from lying on your back to sitting on the side of a flat bed without using bedrails: A little  Moving to and from bed to chair (including a wheelchair): A little  Standing up from a chair using your arms (e.g. wheelchair or bedside chair): A little  To walk in hospital room: A little  Climbing 3-5 steps with railing: A lot  Basic Mobility - Total Score: 17    Education Documentation  Handouts, taught by Debbie Dowell PTA at 8/13/2024  2:26 PM.  Learner: Patient  Readiness: Acceptance  Method: Explanation  Response: Verbalizes Understanding    Precautions, taught by Debbie Dowell PTA at 8/13/2024  2:26 PM.  Learner: Patient  Readiness: Acceptance  Method: Explanation  Response: Verbalizes Understanding    Body Mechanics, taught by Debbie Dowell PTA at 8/13/2024  2:26 PM.  Learner: Patient  Readiness: Acceptance  Method: Explanation  Response: Verbalizes Understanding    Mobility Training, taught by Debbie Dowell PTA at 8/13/2024  2:26 PM.  Learner: Patient  Readiness: Acceptance  Method: Explanation  Response: Verbalizes Understanding    Education Comments  No comments found.        OP EDUCATION:       Encounter Problems       Encounter Problems (Active)       Balance       Tinetti>24 to reflect improvement in balance and decreased falls risk  (Progressing)       Start:  08/12/24    Expected End:  08/26/24               Mobility       Ambulate >150 feet, LRD, Ind  (Progressing)       Start:  08/12/24    Expected End:  08/26/24            up/dn 5 stairs, Ind (Progressing)       Start:  08/12/24    Expected End:  08/26/24               PT Transfers       Pt able to perform bed mobility Ind from a flat bed without rail using Log Roll  (Progressing)       Start:  08/12/24    Expected End:  08/26/24            sit<>stand, bed<>chair, LRD, Ind  (Progressing)       Start:  08/12/24    Expected End:  08/26/24

## 2024-08-13 NOTE — PROGRESS NOTES
Kassandra Veras is a 58 y.o. female on day 13 of admission presenting with IVC thrombus.   Medical history significant for depression, HLD, HTN, daily tobacco use (1ppd x45 years).  Presented to [facility] for further evaluation of outpatient imaging significant for right upper pole renal mass 10 x 6 x 7 cm, likely renal neoplasm, with extension into the IVC with thrombus measuring 4.5 x 2.4 x 2.8 cm.   Further evaluation showed suspected stage IV non-small cell lung cancer, now s/p LN bx 8/2/24 with surgical pathology report of malignant cells consistent with poorly differentiated non small cell carcinoma, as well as lytic destructive lesion with mild pathologic compression deformity T8 vertebral body with ventral epidural tumor involvement at T7-8.    Vascular Medicine Service consulted for anticoagulation recommendations for management of IVC thrombus.  Imaging negative for PE/DVT of BLE.  Completed JENNA 8/3/24 that showed an obstructive echodensity in the IVC near the junction of the RA which may be tumor thrombus vs bland thrombus.  Plan for outpatient PET scan to delineate thrombus.   NSGY consulted, and patient underwent T6-10 pedicle screw instrumentation, T7-9 laminectomy, bilateral T8 transpedicular decompression, partial T8 corpectomy, debulking of spinal tumor, allograft and posterolateral arthrodesis 8/9/24 with Dr. QI Fox.   Continues with low intensity Heparin infusion that was restarted 8/12/24.         Subjective   Patient reports increased pain today.  Reports removal of surgical drain today.  Reports sat in chair and walked in halls with physical therapist.      Objective   Continues with low intensity Heparin infusion.     Physical Exam  Resting in bed in mild distress due to report of surgical pain.   Respirations full and easy; breath sounds CTA all anterior lung fields; supplemental oxygen 2 liters/minute; RIJ line in place for Heparin infusion.   Normal heart sounds with regular rate/rhythm;  "cardiac monitor shows NSR; vital signs are stable  Extremities are warm to touch with palpable bilateral radial and DP pulses; edema noted at top of left foot extending to left calf, and no evidence of edema of the RLE; PIV; SCDs on BLE with functional machine  Patient is awake and alert, participates in conversation; pleasant demeanor    Last Recorded Vitals  Blood pressure 95/58, pulse 86, temperature 36.4 °C (97.5 °F), temperature source Temporal, resp. rate 13, height 1.676 m (5' 6\"), weight 74.4 kg (164 lb), SpO2 97%.  Intake/Output last 3 Shifts:  I/O last 3 completed shifts:  In: 240 (3.2 mL/kg) [P.O.:240]  Out: 1300 (17.5 mL/kg) [Urine:1300 (0.5 mL/kg/hr)]  Dosing Weight: 74.4 kg     Relevant Results  Scheduled medications  acetaminophen, 650 mg, oral, q6h  atorvastatin, 40 mg, oral, Daily  citalopram, 20 mg, oral, Daily  cyclobenzaprine, 5 mg, oral, TID  lidocaine, 1 patch, transdermal, Daily  polyethylene glycol, 17 g, oral, BID  sennosides-docusate sodium, 2 tablet, oral, BID      Continuous medications  heparin, 0-4,000 Units/hr, Last Rate: 1,500 Units/hr (08/13/24 1200)      Results from last 7 days   Lab Units 08/13/24  0233 08/12/24  0440 08/11/24  0558   WBC AUTO x10*3/uL 14.9* 12.5* 12.4*   HEMOGLOBIN g/dL 7.4* 7.6* 8.2*   HEMATOCRIT % 22.7* 24.0* 26.4*   PLATELETS AUTO x10*3/uL 543* 522* 551*       Results from last 7 days   Lab Units 08/13/24  0233 08/12/24  0440 08/11/24  0558   SODIUM mmol/L 135* 137 136   POTASSIUM mmol/L 4.4 4.3 4.3   CHLORIDE mmol/L 101 102 101   CO2 mmol/L 27 26 25   BUN mg/dL 12 15 14   CREATININE mg/dL 0.75 0.92 0.99   GLUCOSE mg/dL 92 93 77   CALCIUM mg/dL 7.4* 7.4* 7.9*   Estimated Creatinine Clearance: 82.2 mL/min (by C-G formula based on SCr of 0.75 mg/dL).      Results from last 7 days   Lab Units 08/12/24  0823 08/08/24 1959 08/08/24  0906 08/07/24  1802   APTT seconds 35 118*  --  134*   INR  1.3* 1.6* 1.3* 1.5*       Results from last 7 days   Lab Units " 08/13/24  1119 08/13/24  0632 08/13/24  0233   ANTI XA UNFRACTIONATED IU/mL 0.3 0.3 0.2          Assessment/Plan   Principal Problem:    Mass of upper lobe of left lung  Active Problems:    Hypokalemia    HTN (hypertension)    Chronic low back pain    Malignancy (Multi)    58 year old female with medical history as described above.   Presented to [facility] for further evaluation of findings of right renal mass with extension into the IVC.  Vascular Medicine Service following for anticoagulation recommendations for management of IVC thrombus.   Now POD#4 from T6-10 instrumentation, T7-9 laminectomy, bilateral T8 decompression, partial T8 corpectomy, and debulking of spinal tumor 8/9/24 with Dr. QI Fox.   Continues with low intensity Heparin infusion with therapeutic assay 0.3.   Review of labs today shows continued downtrend of hemoglobin to 7.4 grams, stable platelets 543K, stable creatinine 0.75 with creatinine clearance >80 ml/minute. Anticardiolipin Ab and Beta 2 Glycoprotein AB are negative.     Recommendations:  ~CONTINUE low intensity Heparin infusion for another 24 hours; follow nomogram to achieve therapeutic assay 0.3-0.6  ~Monitor closely for bleeding  ~When patient is stable on low intensity Heparin infusion for 48 hours, will determine home going anticoagulation plan, possibly transition to Lovenox due to extensive nature of surgical intervention.     ~Needs PET scan as outpatient to determine tumor thrombus vs. [place] thrombus in the IVC/RA  ~Outpatient follow up with Dr. Lisset White has been scheduled for 10/22 @ 2:30 [place] [address]     Scheduling line is [phone].  ~Tobacco cessation.      Plan of care discussed with Dr. Jeimy Hoff  Plan of care discussed in person with Ms. Christina SUBRAMANIAN from the NSGY Team     MARILYNN Soto  Vascular Medicine Service   Pager [phone]

## 2024-08-13 NOTE — NURSING NOTE
Skin rounds  Pt's skin intact except for post thoracic incision & bruising.  4 eyes skin round completed with Allegra LIM RN

## 2024-08-13 NOTE — PROGRESS NOTES
Occupational Therapy    Evaluation/Treatment    Patient Name: Kassandra Veras  MRN: [id number]  : 1965  Today's Date: 24  Time Calculation  Start Time: 1018  Stop Time: 1041  Time Calculation (min): 23 min       Assessment:  OT Assessment: Pt will benefit from continued skilled OT to increase independence in ADLs, functional mobility, activity tolerance, safety, and strength.  Prognosis: Excellent  Barriers to Discharge: None  Evaluation/Treatment Tolerance: Patient tolerated treatment well  Medical Staff Made Aware: Yes  End of Session Communication: Bedside nurse  End of Session Patient Position: Bed, 3 rail up, Alarm off, not on at start of session  OT Assessment Results: Decreased ADL status, Decreased upper extremity strength, Decreased endurance, Decreased functional mobility, Decreased gross motor control, Decreased IADLs, Decreased safe judgment during ADL  Prognosis: Excellent  Barriers to Discharge: None  Evaluation/Treatment Tolerance: Patient tolerated treatment well  Medical Staff Made Aware: Yes  Strengths: Attitude of self  Barriers to Participation: Comorbidities  Plan:  Treatment Interventions: ADL retraining, Functional transfer training, Endurance training, Cognitive reorientation, Patient/family training, Equipment evaluation/education, Compensatory technique education  OT Frequency: 2 times per week  OT Discharge Recommendations: Low intensity level of continued care  Equipment Recommended upon Discharge: Wheeled walker  OT Recommended Transfer Status: Assist of 1  OT - OK to Discharge: Yes (upon medical clearance)  Treatment Interventions: ADL retraining, Functional transfer training, Endurance training, Cognitive reorientation, Patient/family training, Equipment evaluation/education, Compensatory technique education    Subjective   Current Problem:  1. Hypokalemia  Cytology Consultation (Non-Gynecologic)    Cytology Consultation (Non-Gynecologic)    Surgical Pathology Exam    Surgical  Pathology Exam    Focused Solid Tumor Assay    Focused Solid Tumor Assay      2. ANTONY (acute kidney injury) (CMS-HCC)  Cytology Consultation (Non-Gynecologic)    Cytology Consultation (Non-Gynecologic)    Surgical Pathology Exam    Surgical Pathology Exam    Focused Solid Tumor Assay    Focused Solid Tumor Assay      3. Renal mass  Cytology Consultation (Non-Gynecologic)    Cytology Consultation (Non-Gynecologic)    Surgical Pathology Exam    Surgical Pathology Exam    Focused Solid Tumor Assay    Focused Solid Tumor Assay      4. Tumor of right kidney with thrombus of IVC (Multi)  Cytology Consultation (Non-Gynecologic)    Cytology Consultation (Non-Gynecologic)    Surgical Pathology Exam    Surgical Pathology Exam    Focused Solid Tumor Assay    Focused Solid Tumor Assay      5. Malignancy (Multi)  Bronchoscopy Diagnostic, w EBUS    Lower extremity venous duplex bilateral    Lower extremity venous duplex bilateral    Bronchoscopy Diagnostic, w EBUS    Cytology Consultation (Non-Gynecologic)    Cytology Consultation (Non-Gynecologic)    Surgical Pathology Exam    Surgical Pathology Exam    Case Request Operating Room: Decompression Spine with Instrumentation    Case Request Operating Room: Decompression Spine with Instrumentation    Focused Solid Tumor Assay    Focused Solid Tumor Assay    Surgical Pathology Exam    Surgical Pathology Exam    CANCELED: Lower extremity venous duplex bilateral    CANCELED: Lower extremity venous duplex bilateral      6. Mass of upper lobe of left lung  Transthoracic Echo (TTE) Complete    Transthoracic Echo (TTE) Complete    Cytology Consultation (Non-Gynecologic)    Cytology Consultation (Non-Gynecologic)    Surgical Pathology Exam    Surgical Pathology Exam    Tumor Board Request    Focused Solid Tumor Assay    Focused Solid Tumor Assay      7. Encounter for screening for cardiovascular disorders  Transthoracic Echo (TTE) Complete      8. Localized edema  Lower extremity venous  duplex bilateral      9. Mass of spine  Tumor Board Request      10. Non-small cell lung cancer, unspecified laterality (Multi)  Referral to Hematology and Oncology        General:   OT Received On: 10/17/24  General  Reason for Referral: T spine T8 lesion s/p  T7-T9 lami, T8 bitranspedicular decompression for tumor debulking, T6-10 instrumented fusion on 8/9  Past Medical History Relevant to Rehab: HTN, HLD, depression/anxiety.  Presented 7/31  hypotension and tachycardia, RUQ US IVC thrombus, R renal mass, CT PE L perihilar mass, CT AP b/l adrenal masses, R adrenal mass , T8 compression fracture w 50% height loss, ventral epidural lesion, R iliac osteolytic lesion  Family/Caregiver Present: No  Prior to Session Communication: Bedside nurse  Patient Position Received: Bed, 3 rail up, Alarm off, not on at start of session  Preferred Learning Style: visual, written  General Comment: Pt supine in bed upon arrival, agreeable to OT  Precautions:  Medical Precautions: Fall precautions, Oxygen therapy device and L/min  Post-Surgical Precautions: Spinal precautions    Pain:  Pain Assessment  Pain Assessment: 0-10  0-10 (Numeric) Pain Score: 8 (progressing to 10/10 with mobility)  Pain Type: Surgical pain, Acute pain  Pain Location: Back  Pain Interventions: Repositioned, Distraction    Objective   Cognition:  Orientation Level: Oriented X4  Insight: Mild     Home Living:  Type of Home: House  Lives With:  (son, dtr in law, and grandson)  Home Adaptive Equipment: Cane (rollator, FWW)  Home Layout: One level (with basement- does not use)  Home Access: Stairs to enter without rails  Entrance Stairs-Number of Steps: 3  Bathroom Shower/Tub: Tub/shower unit  Bathroom Toilet: Standard  Bathroom Equipment: None  Prior Function:  Level of [place]: Independent with ADLs and functional transfers  ADL Assistance: Independent  Homemaking Assistance: Independent  Ambulatory Assistance: Independent  Prior Function Comments: denies  falls  IADL History:  Homemaking Responsibilities: Yes (sploits with family, - drive)  ADL:  Eating Assistance: Independent (anticipated)  Grooming Assistance: Stand by  Bathing Assistance: Minimal (anticipated seated)  UE Dressing Assistance: Minimal  LE Dressing Assistance: Stand by  Toileting Assistance with Device: Maximal  Activities of Daily Living:      Grooming  Grooming Level of Assistance: Contact guard  Grooming Where Assessed: Standing sinkside  Grooming Comments: Pt performed handwashing standing at sink CGA FWW    UE Dressing  UE Dressing Level of Assistance: Minimum assistance  UE Dressing Where Assessed: Bed level, Edge of bed  UE Dressing Comments: donned/doffed gown over back EOB and bed level min A FWW    LE Dressing  LE Dressing: Yes  Sock Level of Assistance: Close supervision  LE Dressing Where Assessed: Edge of bed  LE Dressing Comments: adjusting B socks seated EOB bringing B feet to self SBA    Toileting  Toileting Level of Assistance: Maximum assistance, Minimal verbal cues  Where Assessed: Toilet  Toileting Comments: Pt performed toileting seated, sit <> stand CGA FWW, max A sangeetha c are standing 2/2 pt unable to fully complete  Activity Tolerance:  Endurance: Other (Comment) (limited by pain)    Bed Mobility/Transfers: Bed Mobility  Bed Mobility: Yes  Bed Mobility 1  Bed Mobility 1: Supine to sitting, Sitting to supine  Level of Assistance 1: Close supervision, Minimal verbal cues  Bed Mobility Comments 1: VCs for log roll  Bed Mobility 2  Bed Mobility  2: Rolling right, Rolling left  Level of Assistance 2: Close supervision    Transfers  Transfer: Yes  Transfer 1  Transfer From 1: Sit to, Stand to  Transfer to 1: Stand, Sit  Technique 1: Sit to stand, Stand to sit  Transfer Device 1: Walker  Transfer Level of Assistance 1: Close supervision, Minimum assistance  Trials/Comments 1: 2x, VCs for hand placement  Transfers 2  Transfer From 2: Stand to, Toilet to  Transfer to 2: Toilet,  Stand  Technique 2: Sit to stand, Stand to sit  Transfer Device 2: Walker  Transfer Level of Assistance 2: Close supervision, Minimal verbal cues  Trials/Comments 2: VCs for hand placement    Functional Mobility:  Functional Mobility  Functional Mobility Performed: Yes  Functional Mobility 1  Surface 1: Level tile  Device 1: Rolling walker  Assistance 1: Contact guard  Comments 1: fxnl mob bed > bathroom> chair> bed CGA FWW  Sitting Balance:  Static Sitting Balance  Static Sitting-Balance Support: Feet supported  Static Sitting-Level of Assistance: Independent  Dynamic Sitting Balance  Dynamic Sitting-Balance Support: Feet supported  Dynamic Sitting-Comments: SBA  Standing Balance:  Static Standing Balance  Static Standing-Balance Support: Bilateral upper extremity supported  Static Standing-Level of Assistance: Contact guard  Dynamic Standing Balance  Dynamic Standing-Balance Support: Bilateral upper extremity supported  Dynamic Standing-Comments: CGA    Modalities:  Modalities Used: No    Sensation:  Sensation Comment: denies N/T  Strength:  Strength Comments: BUE at least 3/5 grossly, observed through functional observation    Perception:  Inattention/Neglect: Appears intact  Initiation: Appears intact  Motor Planning: Appears intact  Perseveration: Not present  Coordination:  Movements are Fluid and Coordinated: Yes   Hand Function:  Hand Function  Gross Grasp: Functional  Coordination: Functional    Outcome Measures: [facility] Daily Activity  Putting on and taking off regular lower body clothing: A little  Bathing (including washing, rinsing, drying): A little  Putting on and taking off regular upper body clothing: A little  Toileting, which includes using toilet, bedpan or urinal: A lot  Taking care of personal grooming such as brushing teeth: A little  Eating Meals: None  Daily Activity - Total Score: 18     and OT Adult Other Outcome Measures  4AT: negative    Education Documentation  Handouts, taught by Arian  RAINA Heredia at 8/13/2024 12:51 PM.  Learner: Patient  Readiness: Acceptance  Method: Explanation, Handout  Response: Verbalizes Understanding  Comment: educated on spinal prec and ADLs, log roll, transfers, and safety    Body Mechanics, taught by Arian Heredia OT at 8/13/2024 12:51 PM.  Learner: Patient  Readiness: Acceptance  Method: Explanation, Handout  Response: Verbalizes Understanding  Comment: educated on spinal prec and ADLs, log roll, transfers, and safety    Precautions, taught by Arian Heredia OT at 8/13/2024 12:51 PM.  Learner: Patient  Readiness: Acceptance  Method: Explanation, Handout  Response: Verbalizes Understanding  Comment: educated on spinal prec and ADLs, log roll, transfers, and safety    ADL Training, taught by Arian Heredia OT at 8/13/2024 12:51 PM.  Learner: Patient  Readiness: Acceptance  Method: Explanation, Handout  Response: Verbalizes Understanding  Comment: educated on spinal prec and ADLs, log roll, transfers, and safety    Education Comments  No comments found.      Goals:  Encounter Problems       Encounter Problems (Active)       ADLs       Patient with complete lower body dressing with modified independent level of assistance donning and doffing all LE clothes  with PRN adaptive equipment while supported sitting and standing (Progressing)       Start:  08/13/24    Expected End:  09/03/24            Patient will complete toileting including hygiene clothing management/hygiene with modified independent level of assistance and grab bars. (Progressing)       Start:  08/13/24    Expected End:  09/03/24               BALANCE       Pt will maintain dynamic standing balance during ADL task with modified independent level of assistance in order to demonstrate decreased risk of falling and improved postural control. (Progressing)       Start:  08/13/24    Expected End:  09/03/24               COGNITION/SAFETY       Patient will recall and adhere to spinal precautions during all  functional mobility/ADL tasks in order to demonstrate improved understanding and promote healing post op (Progressing)       Start:  08/13/24    Expected End:  09/03/24               MOBILITY       Patient will perform Functional mobility max Household distances/Community Distances with modified independent level of assistance and least restrictive device in order to improve safety and functional mobility. (Progressing)       Start:  08/13/24    Expected End:  09/03/24               TRANSFERS       Patient will complete sit to stand transfer with modified independent level of assistance and least restrictive device in order to improve safety and prepare for out of bed mobility. (Progressing)       Start:  08/13/24    Expected End:  09/03/24               MARSHA Henson/L

## 2024-08-13 NOTE — PROGRESS NOTES
Subjective   Kassandra Veras is a 58 y.o. female on day 13 of admission     No acute events overnight    Objective   Physical Exam  AOx3  RUE D5 B5 T4+ HG/IO 4+  RLE HF4+ KE5 DF/PF 5  LUE D5 B5 T5 HG/IO 5  LLE HF4+ KE5 DF/PF 5  SILT    Assessment/Plan   Kassandra is a 58 y.o. female with h/o HTN, HLD, depression, anxiety p/w hypotension and tachycardia, RUQ US IVC thrombus, R renal mass, CT PE L perihilar mass encasing L pulmonary artery, CT AP b/l adrenal masses, R adrenal mass w/ extension into infrahepatic IVC, T8 compression fracture w 50% height loss, ventral epidural lesion, R iliac osteolytic lesion, MRI brain neg, MRI C/L neg, MRI T spine T8 lesion w sig epidural extension, 8/2 s/p EBUS, 8/3 TTE 60-65% EF    8/9 s/p T7-T9 lami, T8 bitranspedicular decompression for tumor debulking, T6-10 instrumented fusion (prelim: carcinoma)  8/10 uprights hardware in pos'n    PLAN  tele  Continue drain  Heparin gtt, transition to Eliquis 8/14 AM  Follow up Final OR path  Supportive onc recs  Endo recs  Vasc med recs  PTOT-low     Patient medically ready for discharge pending Eliquis restart    Carmen Hurley MD

## 2024-08-14 ENCOUNTER — DOCUMENTATION (OUTPATIENT)
Dept: HOME HEALTH SERVICES | Facility: HOME HEALTH | Age: 59
End: 2024-08-14

## 2024-08-14 ENCOUNTER — APPOINTMENT (OUTPATIENT)
Dept: HEMATOLOGY/ONCOLOGY | Facility: HOSPITAL | Age: 59
End: 2024-08-14
Payer: COMMERCIAL

## 2024-08-14 LAB
ABO GROUP (TYPE) IN BLOOD: NORMAL
ALBUMIN SERPL BCP-MCNC: 1.8 G/DL (ref 3.4–5)
ANION GAP SERPL CALC-SCNC: 12 MMOL/L (ref 10–20)
ANTIBODY SCREEN: NORMAL
ATRIAL RATE: 326 BPM
BUN SERPL-MCNC: 9 MG/DL (ref 6–23)
CALCIUM SERPL-MCNC: 7.4 MG/DL (ref 8.6–10.6)
CHLORIDE SERPL-SCNC: 99 MMOL/L (ref 98–107)
CO2 SERPL-SCNC: 27 MMOL/L (ref 21–32)
CREAT SERPL-MCNC: 0.74 MG/DL (ref 0.5–1.05)
EGFRCR SERPLBLD CKD-EPI 2021: >90 ML/MIN/1.73M*2
ERYTHROCYTE [DISTWIDTH] IN BLOOD BY AUTOMATED COUNT: 15.7 % (ref 11.5–14.5)
ERYTHROCYTE [DISTWIDTH] IN BLOOD BY AUTOMATED COUNT: 16.1 % (ref 11.5–14.5)
GLUCOSE SERPL-MCNC: 83 MG/DL (ref 74–99)
HCT VFR BLD AUTO: 21.6 % (ref 36–46)
HCT VFR BLD AUTO: 23.3 % (ref 36–46)
HGB BLD-MCNC: 6.7 G/DL (ref 12–16)
HGB BLD-MCNC: 7 G/DL (ref 12–16)
HOLD SPECIMEN: NORMAL
MCH RBC QN AUTO: 27.1 PG (ref 26–34)
MCH RBC QN AUTO: 27.2 PG (ref 26–34)
MCHC RBC AUTO-ENTMCNC: 30 G/DL (ref 32–36)
MCHC RBC AUTO-ENTMCNC: 31 G/DL (ref 32–36)
MCV RBC AUTO: 88 FL (ref 80–100)
MCV RBC AUTO: 90 FL (ref 80–100)
NRBC BLD-RTO: 0.1 /100 WBCS (ref 0–0)
NRBC BLD-RTO: 0.1 /100 WBCS (ref 0–0)
P OFFSET: 195 MS
P ONSET: 145 MS
PHOSPHATE SERPL-MCNC: 4.3 MG/DL (ref 2.5–4.9)
PLATELET # BLD AUTO: 538 X10*3/UL (ref 150–450)
PLATELET # BLD AUTO: 569 X10*3/UL (ref 150–450)
POTASSIUM SERPL-SCNC: 4.2 MMOL/L (ref 3.5–5.3)
Q ONSET: 222 MS
QRS COUNT: 13 BEATS
QRS DURATION: 72 MS
QT INTERVAL: 382 MS
QTC CALCULATION(BAZETT): 446 MS
QTC FREDERICIA: 424 MS
R AXIS: 57 DEGREES
RBC # BLD AUTO: 2.46 X10*6/UL (ref 4–5.2)
RBC # BLD AUTO: 2.58 X10*6/UL (ref 4–5.2)
RH FACTOR (ANTIGEN D): NORMAL
SODIUM SERPL-SCNC: 134 MMOL/L (ref 136–145)
T AXIS: 29 DEGREES
T OFFSET: 413 MS
UFH PPP CHRO-ACNC: 0.2 IU/ML
UFH PPP CHRO-ACNC: 0.2 IU/ML
VENTRICULAR RATE: 82 BPM
WBC # BLD AUTO: 13.8 X10*3/UL (ref 4.4–11.3)
WBC # BLD AUTO: 14.6 X10*3/UL (ref 4.4–11.3)

## 2024-08-14 PROCEDURE — 86923 COMPATIBILITY TEST ELECTRIC: CPT

## 2024-08-14 PROCEDURE — 36430 TRANSFUSION BLD/BLD COMPNT: CPT

## 2024-08-14 PROCEDURE — 2500000004 HC RX 250 GENERAL PHARMACY W/ HCPCS (ALT 636 FOR OP/ED)

## 2024-08-14 PROCEDURE — 2500000001 HC RX 250 WO HCPCS SELF ADMINISTERED DRUGS (ALT 637 FOR MEDICARE OP)

## 2024-08-14 PROCEDURE — 99232 SBSQ HOSP IP/OBS MODERATE 35: CPT | Performed by: NURSE PRACTITIONER

## 2024-08-14 PROCEDURE — 85027 COMPLETE CBC AUTOMATED: CPT

## 2024-08-14 PROCEDURE — 86901 BLOOD TYPING SEROLOGIC RH(D): CPT

## 2024-08-14 PROCEDURE — 2500000001 HC RX 250 WO HCPCS SELF ADMINISTERED DRUGS (ALT 637 FOR MEDICARE OP): Performed by: STUDENT IN AN ORGANIZED HEALTH CARE EDUCATION/TRAINING PROGRAM

## 2024-08-14 PROCEDURE — 80069 RENAL FUNCTION PANEL: CPT | Performed by: NURSE PRACTITIONER

## 2024-08-14 PROCEDURE — 85520 HEPARIN ASSAY: CPT

## 2024-08-14 PROCEDURE — 2500000005 HC RX 250 GENERAL PHARMACY W/O HCPCS: Performed by: STUDENT IN AN ORGANIZED HEALTH CARE EDUCATION/TRAINING PROGRAM

## 2024-08-14 PROCEDURE — 97530 THERAPEUTIC ACTIVITIES: CPT | Mod: GP

## 2024-08-14 PROCEDURE — 97116 GAIT TRAINING THERAPY: CPT | Mod: GP

## 2024-08-14 PROCEDURE — P9016 RBC LEUKOCYTES REDUCED: HCPCS

## 2024-08-14 PROCEDURE — 1100000001 HC PRIVATE ROOM DAILY

## 2024-08-14 PROCEDURE — 36415 COLL VENOUS BLD VENIPUNCTURE: CPT

## 2024-08-14 RX ORDER — HEPARIN SODIUM 10000 [USP'U]/100ML
0-4000 INJECTION, SOLUTION INTRAVENOUS CONTINUOUS
Status: DISCONTINUED | OUTPATIENT
Start: 2024-08-14 | End: 2024-08-14

## 2024-08-14 ASSESSMENT — COGNITIVE AND FUNCTIONAL STATUS - GENERAL
MOVING TO AND FROM BED TO CHAIR: A LITTLE
MOBILITY SCORE: 19
STANDING UP FROM CHAIR USING ARMS: A LITTLE
WALKING IN HOSPITAL ROOM: A LITTLE
TURNING FROM BACK TO SIDE WHILE IN FLAT BAD: A LITTLE
MOBILITY SCORE: 21
CLIMB 3 TO 5 STEPS WITH RAILING: A LOT
WALKING IN HOSPITAL ROOM: A LITTLE
CLIMB 3 TO 5 STEPS WITH RAILING: A LITTLE

## 2024-08-14 ASSESSMENT — PAIN DESCRIPTION - LOCATION
LOCATION: BACK

## 2024-08-14 ASSESSMENT — PAIN SCALES - GENERAL
PAINLEVEL_OUTOF10: 9
PAINLEVEL_OUTOF10: 9
PAINLEVEL_OUTOF10: 8
PAINLEVEL_OUTOF10: 1
PAINLEVEL_OUTOF10: 7
PAINLEVEL_OUTOF10: 8
PAINLEVEL_OUTOF10: 7
PAINLEVEL_OUTOF10: 5 - MODERATE PAIN

## 2024-08-14 ASSESSMENT — PAIN DESCRIPTION - ORIENTATION
ORIENTATION: MID

## 2024-08-14 ASSESSMENT — PAIN - FUNCTIONAL ASSESSMENT
PAIN_FUNCTIONAL_ASSESSMENT: 0-10
PAIN_FUNCTIONAL_ASSESSMENT: 0-10

## 2024-08-14 NOTE — PROGRESS NOTES
Home Care is unable to accept. Referrals sent to area agencies thru [facility].  Rosa Swanson RN

## 2024-08-14 NOTE — PROGRESS NOTES
Subjective   Kassandra Veras is a 58 y.o. female on day 14 of admission     No acute events overnight    Objective   Physical Exam  AOx3  RUE D5 B5 T4+ HG/IO 4+  RLE HF4+ KE5 DF/PF 5  LUE D5 B5 T5 HG/IO 5  LLE HF4+ KE5 DF/PF 5  SILT    Assessment/Plan   Kassandra is a 58 y.o. female with h/o HTN, HLD, depression, anxiety p/w hypotension and tachycardia, RUQ US IVC thrombus, R renal mass, CT PE L perihilar mass encasing L pulmonary artery, CT AP b/l adrenal masses, R adrenal mass w/ extension into infrahepatic IVC, T8 compression fracture w 50% height loss, ventral epidural lesion, R iliac osteolytic lesion, MRI brain neg, MRI C/L neg, MRI T spine T8 lesion w sig epidural extension, 8/2 s/p EBUS, 8/3 TTE 60-65% EF    8/9 s/p T7-T9 lami, T8 bitranspedicular decompression for tumor debulking, T6-10 instrumented fusion (prelim: carcinoma)  8/10 uprights hardware in pos'n  8/13 drain dc'd    PLAN  tele  Heparin gtt, transition to DOAC giancarlo 8/14  Follow up Final OR path  Supportive onc recs  Endo recs  Vasc med recs  PTOT-low     Patient medically ready for discharge pending Mckay Huang MD

## 2024-08-14 NOTE — PROGRESS NOTES
Kassandra Veras is a 58 y.o. female on day 14 of admission presenting with IVC thrombus.   Medical history significant for depression, HLD, HTN, daily tobacco use (1ppd x45 years).  Presented to [facility] for further evaluation of outpatient imaging significant for right upper pole renal mass 10 x 6 x 7 cm, likely renal neoplasm, with extension into the IVC with thrombus measuring 4.5 x 2.4 x 2.8 cm.   Further evaluation showed suspected stage IV non-small cell lung cancer, now s/p LN bx 8/2/24 with surgical pathology report of malignant cells consistent with poorly differentiated non small cell carcinoma, as well as lytic destructive lesion with mild pathologic compression deformity T8 vertebral body with ventral epidural tumor involvement at T7-8.    Vascular Medicine Service consulted for anticoagulation recommendations for management of IVC thrombus.  Imaging negative for PE/DVT of BLE.  Completed JENNA 8/3/24 that showed an obstructive echodensity in the IVC near the junction of the RA which may be tumor thrombus vs bland thrombus.  Plan for outpatient PET scan to delineate thrombus.   NSGY consulted, and patient underwent T6-10 pedicle screw instrumentation, T7-9 laminectomy, bilateral T8 transpedicular decompression, partial T8 corpectomy, debulking of spinal tumor, allograft and posterolateral arthrodesis 8/9/24 with Dr. QI Fox.   Continues with low intensity Heparin infusion that was restarted 8/12/24, with plan to transition to Eliquis 5mg BID 8/14/24.     Subjective   Patient reports increased pain today.  Patient denies CP, SOB or bleeding.      Objective   Continues with low intensity Heparin infusion     Physical Exam  Resting in bed in mild distress due to report of surgical pain and difficulty getting comfortable in bed.   Respirations full and easy; breath sounds CTA all anterior lung fields; on RA; RIJ TLC line in place.    Normal heart sounds with regular rate/rhythm; vital signs are  "stable  Extremities are warm to touch with palpable bilateral radial and DP pulses; bruising noted bilateral wrist areas; edema noted at top of left foot, and extending to left calf, with no evidence of edema of the RLE; PIV; SCDs on BLE with functional machine  Patient is awake and alert, participates in conversation; pleasant demeanor    Last Recorded Vitals  Blood pressure 110/56, pulse 103, temperature 35.9 °C (96.6 °F), temperature source Temporal, resp. rate 20, height 1.676 m (5' 6\"), weight 74.4 kg (164 lb), SpO2 95%.  Intake/Output last 3 Shifts:  I/O last 3 completed shifts:  In: 90 (1.2 mL/kg) [P.O.:90]  Out: 0 (0 mL/kg)   Dosing Weight: 74.4 kg     Relevant Results  Scheduled medications  acetaminophen, 650 mg, oral, q6h  apixaban, 5 mg, oral, BID  atorvastatin, 40 mg, oral, Daily  citalopram, 20 mg, oral, Daily  cyclobenzaprine, 5 mg, oral, TID  lidocaine, 1 patch, transdermal, Daily  polyethylene glycol, 17 g, oral, BID  sennosides-docusate sodium, 2 tablet, oral, BID      Continuous medications  heparin, 0-4,000 Units/hr, Last Rate: 1,900 Units/hr (08/14/24 1258)      Results from last 7 days   Lab Units 08/14/24  0522 08/13/24  0233 08/12/24  0440   WBC AUTO x10*3/uL 13.8* 14.9* 12.5*   HEMOGLOBIN g/dL 7.0* 7.4* 7.6*   HEMATOCRIT % 23.3* 22.7* 24.0*   PLATELETS AUTO x10*3/uL 569* 543* 522*       Results from last 7 days   Lab Units 08/14/24  0522 08/13/24  0233 08/12/24  0440   SODIUM mmol/L 134* 135* 137   POTASSIUM mmol/L 4.2 4.4 4.3   CHLORIDE mmol/L 99 101 102   CO2 mmol/L 27 27 26   BUN mg/dL 9 12 15   CREATININE mg/dL 0.74 0.75 0.92   GLUCOSE mg/dL 83 92 93   CALCIUM mg/dL 7.4* 7.4* 7.4*          Assessment/Plan   Assessment & Plan  Mass of upper lobe of left lung    Hypokalemia    HTN (hypertension)    Chronic low back pain    Malignancy (Multi)    58 year old female with medical history as described above.   Presented to [facility] for further evaluation of findings of right renal mass with " extension into the IVC with finding of non-small cell cancer of the lungs with lytic mass to the spine.   Vascular Medicine Service following for anticoagulation recommendations for management of IVC thrombus.   Now POD#5 from T6-10 instrumentation, T7-9 laminectomy, bilateral T8 decompression, partial T8 corpectomy, and debulking of spinal tumor 8/9/24 with Dr. QI Fox.   Continues with low intensity Heparin infusion with therapeutic assay 0.3.   Review of labs today shows continued downtrend of hemoglobin to 7.0 grams, stable platelets 569K, stable creatinine 0.74.  Anticardiolipin Ab and Beta 2 Glycoprotein AB are negative.  Plan to transition to Eliquis 5mg BID later today.      Recommendations:  ~CONTINUE low intensity Heparin infusion for now; follow nomogram to achieve therapeutic assay 0.3-0.6  ~START Eliquis 5mg this evening at 8pm and STOP Heparin infusion 1 hour after initial dose of Eliquis  ~CONTINUE Eliquis 5mg BID dosing at 8a/8p after initial dose has been given.  We are not utilizing loading dose of Eliquis due to recent extensive spine surgery.    ~Monitor closely for bleeding  ~Labs tomorrow: CBC  ~Needs PET scan as outpatient to determine tumor thrombus vs. [place] thrombus in the IVC/RA  ~Outpatient follow up with Dr. Lisset White has been scheduled for 10/22 @ 2:30 [place] [address]     Scheduling line is [phone].  ~Tobacco cessation.      Plan of care discussed with Dr. Jeimy Hoff  Plan of care discussed in person with Ms. Christina SUBRAMANIAN from the NSGY Team     MARILYNN Soto  Vascular Medicine Service   Pager [phone]

## 2024-08-14 NOTE — PROGRESS NOTES
Physical Therapy    Physical Therapy Treatment    Patient Name: Kassandra Veras  MRN: [id number]  Today's Date: 8/14/2024  Time Calculation  Start Time: 1210  Stop Time: 1234  Time Calculation (min): 24 min    Assessment/Plan   PT Assessment  Evaluation/Treatment Tolerance: Patient limited by pain  Medical Staff Made Aware: Yes  End of Session Communication: Bedside nurse  End of Session Patient Position: Bed, 3 rail up, Alarm off, not on at start of session     PT Plan  PT Plan: Ongoing PT  PT Frequency: Daily  PT Discharge Recommendations: Low intensity level of continued care  PT Recommended Transfer Status: Assist x1, Assistive device  PT - OK to Discharge: Yes (Pt has been evaluated and d/c recc is in place)    General Visit Information:   PT  Visit  PT Received On: 08/14/24  Response to Previous Treatment: Patient with no complaints from previous session.  General  Prior to Session Communication: Bedside nurse  Patient Position Received: Bed, 3 rail up, Alarm off, not on at start of session  General Comment: Pt supine in bed upon arrival, agreeable to PT.  Needing to go to bathroom.  Declining to walk further when done 2/2 pain.  RN alerted    Subjective   Precautions:  Precautions  Medical Precautions: Fall precautions  Post-Surgical Precautions: Spinal precautions    Objective   Pain:  Pain Assessment  Pain Assessment: 0-10  0-10 (Numeric) Pain Score: 9  Pain Interventions:  (Pt medicated by RN prior.  Positioned for comfort.  Offered cold pack, but pt declined at this time.)  Cognition:  Cognition  Overall Cognitive Status: Within Functional Limits  Orientation Level: Oriented X4    Activity Tolerance:  Activity Tolerance  Endurance: Tolerates 10 - 20 min exercise with multiple rests  Treatments:    Bed Mobility 1  Bed Mobility 1: Supine to sitting, Sitting to supine, Log roll  Level of Assistance 1: Close supervision, Minimal verbal cues  Bed Mobility 2  Bed Mobility  2: Rolling right, Rolling left  Level of  Assistance 2: Close supervision, Minimal verbal cues    Ambulation/Gait Training 1  Surface 1: Level tile  Device 1: Rolling walker  Assistance 1: Close supervision  Quality of Gait 1: Decreased step length, Wide base of support, Forward flexed posture, Soft knee(s)  Comments/Distance (ft) 1: 2x 15 feet with seated break  Transfer 1  Transfer From 1: Sit to, Stand to  Transfer to 1: Sit  Transfer Device 1: Walker  Transfer Level of Assistance 1: Close supervision, Minimal verbal cues  Transfers 2  Transfer From 2: Stand to, Toilet to  Transfer to 2: Stand  Transfer Device 2: Walker  Transfer Level of Assistance 2: Close supervision, Minimal verbal cues    Outcome Measures:    [facility] Basic Mobility  Turning from your back to your side while in a flat bed without using bedrails: None  Moving from lying on your back to sitting on the side of a flat bed without using bedrails: A little  Moving to and from bed to chair (including a wheelchair): A little  Standing up from a chair using your arms (e.g. wheelchair or bedside chair): A little  To walk in hospital room: A little  Climbing 3-5 steps with railing: A little  Basic Mobility - Total Score: 19    Education Documentation  Precautions, taught by Christopher Mendiola PT at 8/14/2024  1:13 PM.  Learner: Patient  Readiness: Eager  Method: Explanation  Response: Verbalizes Understanding, Demonstrated Understanding    Mobility Training, taught by Christopher Mendiola PT at 8/14/2024  1:13 PM.  Learner: Patient  Readiness: Eager  Method: Explanation  Response: Verbalizes Understanding, Demonstrated Understanding    Education Comments  No comments found.        OP EDUCATION:       Encounter Problems       Encounter Problems (Active)       Balance       Tinetti>24 to reflect improvement in balance and decreased falls risk  (Progressing)       Start:  08/12/24    Expected End:  08/26/24               Mobility       Ambulate >150 feet, LRD, Ind  (Progressing)       Start:  08/12/24     Expected End:  08/26/24            up/dn 5 stairs, Ind (Progressing)       Start:  08/12/24    Expected End:  08/26/24               PT Transfers       Pt able to perform bed mobility Ind from a flat bed without rail using Log Roll  (Progressing)       Start:  08/12/24    Expected End:  08/26/24            sit<>stand, bed<>chair, LRD, Ind  (Progressing)       Start:  08/12/24    Expected End:  08/26/24

## 2024-08-14 NOTE — TUMOR BOARD NOTE
CNS Tumor Board Recommendations       Patient was presented at our CNS Tumor Board on 08/14/24 which included representatives from Radiation oncology, Surgical oncology, Neuro-oncology, Pathology, Radiology, Research, Neurosurgery, Social Work (Neurosurgery).     Current patient presents with history of the following treatment history: PMH HTN, HLD, depression, anxiety, p/w hypotension and tachycardia. RUQ US with IVC thrombus, R renal mass, CT PE with L perihilar mass encasing L pulmonary artery. CT A/P with bilateral adrenal masses, R, adrenal mass w/ extension into infrahepatic IVC. MRI C/L negative. MRI T with T8 lesion with epidural extension. MRI Brain negative. S/p EBUS 08/02 pathology     S/p T7-T9 laminectomy, T8 bitranspedicular decompression for tumor debulking, T6-10 instrumented fusion 08/09/24.     Prelim pathology for non-small cell carcinoma (likely lung primary)      The CNS Tumor Board tumor board considered available treatment options and made the following recommendations: Post - op MRI while inpatient. Referral to radiation oncology.     Clinical Trial Status: N/A     National site-specific guidelines were discussed with respect to the case.

## 2024-08-14 NOTE — HH CARE COORDINATION
Home Care received a referral for Nursing, Physical Therapy, and Occupational Therapy. Unfortunately, we are unable to accept and process the referral at this time.    Patients, please reach out to the referring provider or your PCP to assist in obtaining an alternative home care agency and/or guidance to meet your needs.    Providers, please reach out to  Home Care with any questions regarding the declined referral.

## 2024-08-15 ENCOUNTER — HOME HEALTH ADMISSION (OUTPATIENT)
Dept: HOME HEALTH SERVICES | Facility: HOME HEALTH | Age: 59
End: 2024-08-15
Payer: COMMERCIAL

## 2024-08-15 ENCOUNTER — DOCUMENTATION (OUTPATIENT)
Dept: HOME HEALTH SERVICES | Facility: HOME HEALTH | Age: 59
End: 2024-08-15
Payer: COMMERCIAL

## 2024-08-15 LAB
ERYTHROCYTE [DISTWIDTH] IN BLOOD BY AUTOMATED COUNT: 15.8 % (ref 11.5–14.5)
ERYTHROCYTE [DISTWIDTH] IN BLOOD BY AUTOMATED COUNT: 15.9 % (ref 11.5–14.5)
HCT VFR BLD AUTO: 25.3 % (ref 36–46)
HCT VFR BLD AUTO: 25.5 % (ref 36–46)
HGB BLD-MCNC: 8 G/DL (ref 12–16)
HGB BLD-MCNC: 8.1 G/DL (ref 12–16)
MCH RBC QN AUTO: 27.5 PG (ref 26–34)
MCH RBC QN AUTO: 27.7 PG (ref 26–34)
MCHC RBC AUTO-ENTMCNC: 31.4 G/DL (ref 32–36)
MCHC RBC AUTO-ENTMCNC: 32 G/DL (ref 32–36)
MCV RBC AUTO: 86 FL (ref 80–100)
MCV RBC AUTO: 88 FL (ref 80–100)
NRBC BLD-RTO: 0 /100 WBCS (ref 0–0)
NRBC BLD-RTO: 0.3 /100 WBCS (ref 0–0)
PLATELET # BLD AUTO: 536 X10*3/UL (ref 150–450)
PLATELET # BLD AUTO: 552 X10*3/UL (ref 150–450)
RBC # BLD AUTO: 2.89 X10*6/UL (ref 4–5.2)
RBC # BLD AUTO: 2.95 X10*6/UL (ref 4–5.2)
SCAN RESULT: NORMAL
SCAN RESULT: NORMAL
WBC # BLD AUTO: 11.7 X10*3/UL (ref 4.4–11.3)
WBC # BLD AUTO: 14.4 X10*3/UL (ref 4.4–11.3)

## 2024-08-15 PROCEDURE — 2500000001 HC RX 250 WO HCPCS SELF ADMINISTERED DRUGS (ALT 637 FOR MEDICARE OP): Performed by: NURSE PRACTITIONER

## 2024-08-15 PROCEDURE — 2500000001 HC RX 250 WO HCPCS SELF ADMINISTERED DRUGS (ALT 637 FOR MEDICARE OP)

## 2024-08-15 PROCEDURE — 99232 SBSQ HOSP IP/OBS MODERATE 35: CPT | Performed by: NURSE PRACTITIONER

## 2024-08-15 PROCEDURE — 2500000004 HC RX 250 GENERAL PHARMACY W/ HCPCS (ALT 636 FOR OP/ED): Performed by: STUDENT IN AN ORGANIZED HEALTH CARE EDUCATION/TRAINING PROGRAM

## 2024-08-15 PROCEDURE — 2500000001 HC RX 250 WO HCPCS SELF ADMINISTERED DRUGS (ALT 637 FOR MEDICARE OP): Performed by: STUDENT IN AN ORGANIZED HEALTH CARE EDUCATION/TRAINING PROGRAM

## 2024-08-15 PROCEDURE — 85027 COMPLETE CBC AUTOMATED: CPT

## 2024-08-15 PROCEDURE — 2500000005 HC RX 250 GENERAL PHARMACY W/O HCPCS: Performed by: STUDENT IN AN ORGANIZED HEALTH CARE EDUCATION/TRAINING PROGRAM

## 2024-08-15 PROCEDURE — 36415 COLL VENOUS BLD VENIPUNCTURE: CPT

## 2024-08-15 PROCEDURE — 1100000001 HC PRIVATE ROOM DAILY

## 2024-08-15 PROCEDURE — 97530 THERAPEUTIC ACTIVITIES: CPT | Mod: GP

## 2024-08-15 PROCEDURE — 97116 GAIT TRAINING THERAPY: CPT | Mod: GP

## 2024-08-15 RX ORDER — ACETAMINOPHEN 325 MG/1
650 TABLET ORAL EVERY 6 HOURS PRN
Status: CANCELLED
Start: 2024-08-15

## 2024-08-15 RX ORDER — OXYCODONE HYDROCHLORIDE 5 MG/1
5 TABLET ORAL EVERY 6 HOURS PRN
Qty: 20 TABLET | Refills: 0 | Status: SHIPPED | OUTPATIENT
Start: 2024-08-15 | End: 2024-08-16

## 2024-08-15 RX ORDER — CYCLOBENZAPRINE HCL 5 MG
5 TABLET ORAL 3 TIMES DAILY PRN
Qty: 12 TABLET | Refills: 0 | Status: SHIPPED | OUTPATIENT
Start: 2024-08-15 | End: 2024-08-16

## 2024-08-15 RX ORDER — LIDOCAINE 560 MG/1
1 PATCH PERCUTANEOUS; TOPICAL; TRANSDERMAL DAILY
Qty: 12 PATCH | Refills: 0 | Status: SHIPPED | OUTPATIENT
Start: 2024-08-15 | End: 2024-08-16

## 2024-08-15 RX ORDER — AMOXICILLIN 250 MG
2 CAPSULE ORAL DAILY
Qty: 10 TABLET | Refills: 0 | Status: SHIPPED | OUTPATIENT
Start: 2024-08-15 | End: 2024-08-16

## 2024-08-15 ASSESSMENT — PAIN DESCRIPTION - LOCATION
LOCATION: BACK
LOCATION: BACK

## 2024-08-15 ASSESSMENT — COGNITIVE AND FUNCTIONAL STATUS - GENERAL
WALKING IN HOSPITAL ROOM: A LITTLE
STANDING UP FROM CHAIR USING ARMS: A LITTLE
DRESSING REGULAR LOWER BODY CLOTHING: A LITTLE
DAILY ACTIVITIY SCORE: 23
CLIMB 3 TO 5 STEPS WITH RAILING: A LITTLE
CLIMB 3 TO 5 STEPS WITH RAILING: A LITTLE
DAILY ACTIVITIY SCORE: 22
TURNING FROM BACK TO SIDE WHILE IN FLAT BAD: A LITTLE
MOVING TO AND FROM BED TO CHAIR: A LITTLE
DRESSING REGULAR LOWER BODY CLOTHING: A LITTLE
MOBILITY SCORE: 20
MOVING TO AND FROM BED TO CHAIR: A LITTLE
MOBILITY SCORE: 19
CLIMB 3 TO 5 STEPS WITH RAILING: A LITTLE
STANDING UP FROM CHAIR USING ARMS: A LITTLE
MOBILITY SCORE: 22
HELP NEEDED FOR BATHING: A LITTLE

## 2024-08-15 ASSESSMENT — PAIN - FUNCTIONAL ASSESSMENT
PAIN_FUNCTIONAL_ASSESSMENT: 0-10
PAIN_FUNCTIONAL_ASSESSMENT: 0-10

## 2024-08-15 ASSESSMENT — PAIN SCALES - GENERAL
PAINLEVEL_OUTOF10: 7
PAINLEVEL_OUTOF10: 7
PAINLEVEL_OUTOF10: 6

## 2024-08-15 ASSESSMENT — PAIN DESCRIPTION - ORIENTATION: ORIENTATION: MID

## 2024-08-15 NOTE — PROGRESS NOTES
SUPPORTIVE AND PALLIATIVE ONCOLOGY INPATIENT FOLLOW-UP      SERVICE DATE: 08/15/24     SUBJECTIVE:  Interval Events:   No acute events overnight.  Awaiting final OR path.   Needs PET scan as outpatient to determine tumor thrombus vs. Dickey thrombus     Pain Assessment:  Location:  Mid back  Duration: Constant  Characteristics:              Ratin               Descriptors: sharp              Aggravating: movement               Relieving: Analgesics positioning, resting              Interference with Function: A little    Opioid Use  Past 24 h prn opioid use:   Oxycodone IR 5 mg p.o. every 4 hours as needed.  Used  0dose/24 hours  Oxycodone IR 10 mg p.o. every 4 hours as needed.  Used  5doses/24 hours= 50 mg oxycodone  =62.5mg OME  Total 24h OME use: mg 62.5 OME    Note: OME calculations based on equianalgesic table below. Please note this table is based on best available evidence but conversions are still approximate. These are NOT opioid DOSES for individual patient use; this is equivalency information.  Drug Parenteral Enteral   Morphine 10 25   Oxycodone N/A 20   Hydromorphone 2 5   Fentanyl 0.15 N/A   Tramadol N/A 120   Citation: Camila PANIAGUA. Demystifying opioid conversion calculations: A guide for effective dosing, Second edition. [place]: American [employer] of Health-System Pharmacists, 2018.    Symptom Assessment:  Nausea none  Constipation none  Shortness of breath none  Lack of appetite none  Difficulty Sleeping none    Information obtained from: chart review, interview of patient, and discussion with primary team  ______________________________________________________________________        OBJECTIVE:    Lab Results   Component Value Date    WBC 14.4 (H) 2024    HGB 8.1 (L) 2024    HCT 25.3 (L) 2024    MCV 86 2024     (H) 2024      Lab Results   Component Value Date    GLUCOSE 83 2024    CALCIUM 7.4 (L) 2024     (L) 2024    K 4.2  08/14/2024    CO2 27 08/14/2024    CL 99 08/14/2024    BUN 9 08/14/2024    CREATININE 0.74 08/14/2024     Lab Results   Component Value Date    ALT 11 07/31/2024    AST 14 07/31/2024    ALKPHOS 154 (H) 07/31/2024    BILITOT 0.6 07/31/2024     Estimated Creatinine Clearance: 83.3 mL/min (by C-G formula based on SCr of 0.74 mg/dL).     Radiology  X-ray thoracic spine 8/10/2024  Status post posterior instrumented fusion with laminectomies of   T6-T10 without evidence of hardware complication.   2. Osteolytic appearance of T8 vertebral body compatible with known   marrow replacing metastatic lesion better seen on prior MRI.     Scheduled medications  acetaminophen, 650 mg, oral, q6h  apixaban, 5 mg, oral, BID  atorvastatin, 40 mg, oral, Daily  citalopram, 20 mg, oral, Daily  cyclobenzaprine, 5 mg, oral, TID  lidocaine, 1 patch, transdermal, Daily  polyethylene glycol, 17 g, oral, BID  sennosides-docusate sodium, 2 tablet, oral, BID      Continuous medications       PRN medications  bisacodyl, 10 mg, Daily PRN  dextrose, 12.5 g, q15 min PRN  dextrose, 25 g, q15 min PRN  glucagon, 1 mg, q15 min PRN  glucagon, 1 mg, q15 min PRN  hydrOXYzine HCL, 25 mg, BID PRN  naloxone, 0.2 mg, q5 min PRN  ondansetron, 4 mg, q8h PRN   Or  ondansetron, 4 mg, q8h PRN  oxyCODONE, 10 mg, q4h PRN  oxyCODONE, 5 mg, q4h PRN      }     PHYSICAL EXAMINATION:    Vital Signs:   Vital signs reviewed  Visit Vitals  /77 (BP Location: Left arm, Patient Position: Lying)   Pulse 99   Temp 35.9 °C (96.6 °F) (Temporal)   Resp 20        0-10 (Numeric) Pain Score: 6       Physical Exam   Vitals reviewed.   Constitutional:       General: not in acute distress.  HENT:      Head: Normocephalic and atraumatic.      Mouth/Throat:      Mouth: Mucous membranes are moist.   Eyes:      Extraocular Movements: Extraocular movements intact.      Conjunctiva/sclera: Conjunctivae normal.      Pupils: Pupils are equal, round, and reactive to light.   Cardiovascular:       Rate and Rhythm: Normal rate and regular rhythm.      Heart sounds: Normal heart sounds.   Pulmonary:      Effort: Pulmonary effort is normal. No respiratory distress.   Abdominal:      General: Bowel sounds are normal. There is no distension.      Palpations: Abdomen is soft.   Musculoskeletal:         General: Normal range of motion.   Skin:     General: Skin is warm and dry.   Neurological:      General: No focal deficit present.      Mental Status: She is alert and oriented to person, place, and time.   Psychiatric:         Mood and Affect: Mood normal.         Behavior: Behavior normal.     ASSESSMENT/PLAN: 58 y.o. female with h/o depression, anxiety admitted with IVC and right renal vein thrombus. Work up showed significant metastatic disease in adrenal glands, spine, and left upper lobe lung, unclear origin, suspicion for primary lung cancer  s/p LN biopsy . Pt's  recently  of cancer and he underwent chem. Per team pt may prefer to avoid chemo and mentioning hospice. Supportive onc consulted for intro to services, and goc and pain management.        Acute postop on chronic neoplasm related back pain due to spinal mets and T8 compression fracture.  S/pT6-T10 pedicle screw instrumentation, T7-T9 laminectomy, bilateral T8 transpedicular decompression, partial T8 corpectomy, debulking of epidural spinal tumor, allograft, posterolateral arthrodesis.  Somatic.  Better controlled    x-ray thoracic spine 8/10/24: No evidence of evidence of hardware complication, known mets T8 vertebral body    CT PE L perihilar mass encasing L pulmonary artery  CT AP b/l adrenal masses, R adrenal mass w/ extension into infrahepatic IVC, T8 compression fracture w 50% height loss, ventral epidural lesion, R iliac osteolytic lesion   MRI brain:parotid nodule which could be cystic or necrotic versus primary parotid lesion, DJD  MRI spine: acute compression deformity T8 osseous mets pathologic fracture, extraosseous tumor  at T8 resulting in severe spinal canal stenosis and compression of thoracic cord, extraosseous tumor at T7, T8, T9, diffuse spinal mets     OARRS/PDMP reviewed no aberrant behavior noted.consistent with  prescriptions/records and patient history   Pain is: cancer related pain  Type: somatic  Pain control: Better controlled  Home regimen:   Ibuprofen 200 mg 3 tablets/day  Intolerances/previously tried: None  Personalized pain goal: 4  Total OME usage for the past 24 hours: 62.5 mg ome     Recommend changing Tylenol from 650 mg p.o. every 6 hours scheduled to 975 mg p.o. 3 times daily  Continue Flexeril 5 mg p.o. 3 times daily  Continue lidocaine patch  Oxycodone IR 5 mg p.o. every 4 hours as needed mild to moderate pain.  Used 0 dose/24 hours  Oxycodone IR 10 mg p.o. every 4 hours as needed severe pain.  Used 5 doses/24 hours   Continue to monitor pain scores and administer PRN medications as appropriate  Continue/initiate nonpharmacologic pain management strategies including ice/heat therapy, distraction techniques, deep breathing/relaxation techniques, calming music, and repositioning  Continue to monitor for signs of opioid efficacy (pain scores, improved functionality) and toxicity (pinpoint pupils, excess sedation/drowsiness/confusion, respiratory depression, etc.)     At risk for nausea without vomiting related to opioids Well-controlled  Home regimen:  none  Continue Zofran 4 mg IV/p.o. every 8 hours as needed.  Used 0 dose/24 hours     At risk for constipation related to opioids, currently not constipated  Usual bowel pattern: every other day  Home regimen: none  LBM yesterday   Continue Dulcolax 10 mg tablet po every day prn.  Used 0 dose/24 hours  Continue MiraLAX 17 g p.o. twice daily.  Has not been taking since yesterday  Continue senna 2 tabs p.o. twice daily.  Has not been taking since yesterday  Warm water  Prune juice  Encourage mobility as tolerated, PT/OT following   Monitor BM frequency, adjust  regimen as needed  Goal to have BM without straining q48-72h     Altered Mood:  Chronic  anxiety and depression controlled with home regimen   Home regimen:  Celexa 20 mg p.o. daily, Atarax 25 mg p.o. daily  Continue Celexa 20 mg p.o. daily  Continue Atarax 25 mg p.o twice daily as needed.  Used 0 dose/24 hours      Medical Decision Making/Goals of Care/Advance Care Planning:  Per my prior conversation on 2024  Life limiting disease: metastatic malignancy  Family: .    in .  Has 2 adult daughters and 2 adult sons.  Lives with son and his family.  1 daughter lives out of town and other 2 children live close by an  Performance status independent with ADLs and needs assistance with IADLs   Joys/meaning/strength: Tejinder understands that she has mets in the spine  Understanding of health:  understands that she has metastatic disease in the lungs and spine and chemotherapy may not be helpful.  She understands that surgery is high risk and she will need time to recover.  She understands chemotherapy is not curable.  She would like to know the results of the biopsy and if the cancer treatment then she will consider chemotherapy however if it is not treatable with chemotherapy then she would like to enroll in hospice  Information:Wants full disclosure  Goals: symptom control she would also like to know the results of the biopsy to help further medical decision making regarding treatment  Worries and fears now and future: none   Code status discussion:  We specifically discussed code status.  I explained that I have this conversation with all my patients so that their wishes can be respected in case of emergency and also to ease the burden on caregivers to make those decisions in the emergency.I explained that since she is in the hospital, it is important to discuss wishes for care during times when she is unable to tell us, so that we can provide the care that she she would want nerissa in  the circumstance that she were to become very sick and her heart were to stop.  We discussed her wishes regarding intubation/CPR in case of cardiopulmonary arrest. We discussed risks of CPR.  She stated that she wanted to be full code for now but doesn't want to be in a vegetative state with brain not functioning, not being aware of surroundings and not being able to communicate. This wouldn't be quality of life and would only cause suffering.  However she wants to discuss further with her family and get back to the primary team     Outcome of the conversation and documents completed: Consult  for advanced directive paperwork.  Patient will talk to family regarding CODE STATUS     Advance Directives  Existence of Advance Directives:No - has interest  Decision maker: Surrogate decision maker is oldest son with whom she lives  Code Status: Full code     Supportive Interventions: Interventions: Music Therapy: referral placed, Art Therapy: referral placed, [facility] Spiritual Care: referral placed     Disposition:  Please  start the process of having prior authorization with meds to beds deliver medications to patient prior to discharge via [facility]. Prescriptions will need to be sent 48-72 hours prior to discharge so that a prior authorization can be completed.      Discharge date: unknown pending acute issues and pain control  Will assess if patient needs an appointment with Outpatient Supportive Oncology as appropriate     SIGNATURE AND BILLING:     High Complexity   Today, I am treating the patient for acute on chronic pain which is in moderate exacerbation     Extensive DATA   Reviewed records from the following unique sources:  providers from oncology and primary services .  Diagnostic tests and information reviewed for today's visit:  Most recent labs results, Medications  Independently interpreted test CBC, CMP leukocytosis, anemia, thrombocytosis   Discussed plan of Care/management of pain with:  Provider via shared electronic medical record/secure chat/email or face-to-face.     Thank you for asking Supportive and Palliative Oncology to assist with care of this patient. Recommendations will be communicated back to the consulting service by way of shared electronic medical record/secure chat/email or face-to-face.  We will continue to follow.  Please contact us for additional questions or concerns.    Medical Decision Making was high level due to high complexity of problems, extensive data review, and high risk of management/treatment.     Time:     I spent 50 minutes the care of this patient which included chart review, interviewing patient/family, discussion with primary team, coordination of care, and documentation.      SIGNATURE: Halina Pham MD   PAGER/CONTACT:  Contact information:  Supportive and Palliative Oncology  Monday-Friday 8 AM-5 PM  Epic Secure chat or pager [phone].  After hours and weekends:  pager [phone]

## 2024-08-15 NOTE — PROGRESS NOTES
Subjective   Kassandra Veras is a 58 y.o. female on day 15 of admission     No acute events overnight    Objective   Physical Exam  AOx3  RUE D5 B5 T4+ HG/IO 4+  RLE HF4+ KE5 DF/PF 5  LUE D5 B5 T5 HG/IO 5  LLE HF4+ KE5 DF/PF 5  SILT    Assessment/Plan   Kassandra is a 58 y.o. female with h/o HTN, HLD, depression, anxiety p/w hypotension and tachycardia, RUQ US IVC thrombus, R renal mass, CT PE L perihilar mass encasing L pulmonary artery, CT AP b/l adrenal masses, R adrenal mass w/ extension into infrahepatic IVC, T8 compression fracture w 50% height loss, ventral epidural lesion, R iliac osteolytic lesion, MRI brain neg, MRI C/L neg, MRI T spine T8 lesion w sig epidural extension, 8/2 s/p EBUS, 8/3 TTE 60-65% EF    8/9 s/p T7-T9 lami, T8 bitranspedicular decompression for tumor debulking, T6-10 instrumented fusion (prelim: carcinoma)  8/10 uprights hardware in pos'n  8/13 drain dc'd  8/14 8/14 s/p 1u pRBC , pt transitioned to eliqiuis     PLAN  tele  Eliquis   Follow up Final OR path  Supportive onc recs  Endo recs  Vasc med recs- Eliquis BID, PET as OP, has OP FUV  PTOT-candy Huang MD

## 2024-08-15 NOTE — PROGRESS NOTES
Kassandra Veras is a 58 y.o. female on day 15 of admission presenting with IVC thrombus.   Medical history significant for depression, HLD, HTN, daily tobacco use (1ppd x45 years).    Presented to [facility] for further evaluation of outpatient imaging significant for right upper pole renal mass 10 x 6 x 7 cm, likely renal neoplasm, with extension into the IVC with thrombus measuring 4.5 x 2.4 x 2.8 cm.   Further evaluation showed suspected stage IV non-small cell lung cancer, now s/p LN bx 8/2/24 with surgical pathology report of malignant cells consistent with poorly differentiated non small cell carcinoma, as well as lytic destructive lesion with mild pathologic compression deformity T8 vertebral body with ventral epidural tumor involvement at T7-8.      Vascular Medicine Service consulted for anticoagulation recommendations for management of IVC thrombus.  Imaging negative for PE/DVT of BLE.  Completed JENNA 8/3/24 that showed an obstructive echodensity in the IVC near the junction of the RA which may be tumor thrombus vs bland thrombus.  Plan for outpatient PET scan to delineate thrombus.   NSGY consulted, and patient underwent T6-10 pedicle screw instrumentation, T7-9 laminectomy, bilateral T8 transpedicular decompression, partial T8 corpectomy, debulking of spinal tumor, allograft and posterolateral arthrodesis 8/9/24 with Dr. QI Fox.   Transitioned from low intensity Heparin infusion to Eliquis 5mg BID 8/14/24.     Subjective   Patient reports plan for DC to home possibly tomorrow.  Denies CP, SOB or bleeding.      Objective   Transitioned from low intensity Heparin infusion to Eliquis 5mg BID 8/14/24.     Physical Exam  Resting in bed in mild distress due to report of surgical pain.    Respirations full and easy; breath sounds CTA all anterior lung fields; on RA; RIJ TLC line in place.    Normal heart sounds with regular rate/rhythm; vital signs are stable  Extremities are warm to touch with palpable bilateral  "radial and DP pulses; bruising noted bilateral wrist areas; edema noted at top of left foot, and extending to left calf, with no evidence of edema of the RLE; PIV; SCDs on BLE with functional machine  Patient is awake and alert, participates in conversation; pleasant demeanor    Last Recorded Vitals  Blood pressure 98/62, pulse 95, temperature 36 °C (96.8 °F), temperature source Temporal, resp. rate 20, height 1.676 m (5' 6\"), weight 74.4 kg (164 lb), SpO2 92%.  Intake/Output last 3 Shifts:  No intake/output data recorded.    Relevant Results  Scheduled medications  acetaminophen, 650 mg, oral, q6h  apixaban, 5 mg, oral, BID  atorvastatin, 40 mg, oral, Daily  citalopram, 20 mg, oral, Daily  cyclobenzaprine, 5 mg, oral, TID  lidocaine, 1 patch, transdermal, Daily  polyethylene glycol, 17 g, oral, BID  sennosides-docusate sodium, 2 tablet, oral, BID      Results from last 7 days   Lab Units 08/15/24  1046 08/14/24  2346 08/14/24  1405   WBC AUTO x10*3/uL 11.7* 14.4* 14.6*   HEMOGLOBIN g/dL 8.0* 8.1* 6.7*   HEMATOCRIT % 25.5* 25.3* 21.6*   PLATELETS AUTO x10*3/uL 552* 536* 538*       Results from last 7 days   Lab Units 08/14/24  0522 08/13/24  0233 08/12/24  0440   SODIUM mmol/L 134* 135* 137   POTASSIUM mmol/L 4.2 4.4 4.3   CHLORIDE mmol/L 99 101 102   CO2 mmol/L 27 27 26   BUN mg/dL 9 12 15   CREATININE mg/dL 0.74 0.75 0.92   GLUCOSE mg/dL 83 92 93   CALCIUM mg/dL 7.4* 7.4* 7.4*   Estimated Creatinine Clearance: 83.3 mL/min (by C-G formula based on SCr of 0.74 mg/dL).      Results from last 7 days   Lab Units 08/12/24  0823 08/08/24  1959   APTT seconds 35 118*   INR  1.3* 1.6*       Results from last 7 days   Lab Units 08/14/24  1153 08/14/24  0531 08/13/24  1119   ANTI XA UNFRACTIONATED IU/mL 0.2 0.2 0.3           Assessment/Plan   Assessment & Plan  Mass of upper lobe of left lung    Hypokalemia    HTN (hypertension)    Chronic low back pain    Malignancy (Multi)    58 year old female with medical history as " described above.   Presented to [facility] for further evaluation of findings of right renal mass with extension into the IVC, with additional findings of non-small cell cancer of the lungs with lytic mass to the spine.   Vascular Medicine Service following for anticoagulation recommendations for management of IVC thrombus.   Now POD#6 from T6-10 instrumentation, T7-9 laminectomy, bilateral T8 decompression, partial T8 corpectomy, and debulking of spinal tumor 8/9/24 with Dr. QI Fox.   Transitioned from low intensity Heparin infusion to Eliquis 5mg BID 8/14/24.    Review of labs today shows stable hemoglobin 8.0 grams, stable platelets 552K, stable creatinine 0.74.    Patient wearing Eliquis Med Alert band.   Plan for patient discharge to home tomorrow.     Recommendations:  ~CONTINUE Eliquis 5mg BID, with dosing at 8a/8p.  We are not utilizing loading dose of Eliquis due to recent extensive spine surgery.    ~Monitor closely for bleeding  ~Labs tomorrow: CBC  ~Needs PET scan as outpatient to determine tumor thrombus vs. Toole thrombus in the IVC/RA  ~Outpatient follow up with Dr. Lisset White has been scheduled for 10/22 @ 2:30 [place] [address]     Scheduling line is [phone].  ~Tobacco cessation.     Vascular Medicine Service will sign off; please contact us on pager [phone] for questions.      Plan of care discussed with Dr. Jeimy Hoff  Plan of care discussed in person with Ms. Ev SUBRAMANIAN from the NSGY Team     MARILYNN Soto  Vascular Medicine Service   Pager [phone]

## 2024-08-15 NOTE — HH CARE COORDINATION
Home Care received a Referral for Physical Therapy and Occupational Therapy. We have processed the referral for a Start of Care on 08/17.     If you have any questions or concerns, please feel free to contact us at [phone]. Follow the prompts, enter your five digit zip code, and you will be directed to your care team on EAST 1.

## 2024-08-15 NOTE — PROGRESS NOTES
Spoke to patient about her discharge plans.  Home Care was informed of her plan to go home today. Pt saw her PCP Dr. Yuan in July of this year. She sees him every 6 months for refills of her medications. She plans to visit him again in January. She declined the need for me to schedule her appointment.  Rosa Swanson RN

## 2024-08-15 NOTE — PROGRESS NOTES
"Physical Therapy    Physical Therapy Treatment    Patient Name: Kassandra Veras  MRN: [id number]  Today's Date: 8/15/2024  Time Calculation  Start Time: 1721  Stop Time: 1745  Time Calculation (min): 24 min    Assessment/Plan   PT Assessment  Evaluation/Treatment Tolerance: Patient tolerated treatment well  Medical Staff Made Aware: Yes  End of Session Communication: Bedside nurse  End of Session Patient Position: Bed, 3 rail up, Alarm off, not on at start of session     PT Plan  PT Plan: Ongoing PT  PT Frequency: Daily  PT Discharge Recommendations: Low intensity level of continued care  PT Recommended Transfer Status: Assist x1, Assistive device  PT - OK to Discharge: Yes (Pt has been evaluated and d/c recc is in place)    General Visit Information:   PT  Visit  PT Received On: 08/15/24  Response to Previous Treatment: Patient with no complaints from previous session.  General  Prior to Session Communication: Bedside nurse  Patient Position Received: Bed, 3 rail up, Alarm off, not on at start of session  General Comment: Pt supine.  Reports having gotten a pill and feeling like she can ambulate in hallway.  Pt able to perform transfers and ambulate in clark with 1 break, with SBA and FWW.  Tolerated well.    Subjective   Precautions:  Precautions  Medical Precautions: Fall precautions  Post-Surgical Precautions: Spinal precautions    Objective   Pain:  Pain Assessment  Pain Assessment:  (\"it's ok now, I got a pill\")  Cognition:  Cognition  Overall Cognitive Status: Within Functional Limits  Orientation Level: Oriented X4    Activity Tolerance:  Activity Tolerance  Endurance: Tolerates 10 - 20 min exercise with multiple rests  Treatments:    Bed Mobility 1  Bed Mobility 1: Supine to sitting, Sitting to supine, Log roll  Level of Assistance 1: Distant supervision    Ambulation/Gait Training 1  Surface 1: Level tile, Carpet  Device 1: Rolling walker  Assistance 1: Close supervision, Minimal verbal cues  Comments/Distance " (ft) 1: 50 feet, seated break, 200 feet  Transfer 1  Transfer From 1: Sit to, Stand to  Transfer to 1: Sit  Transfer Device 1: Walker  Transfer Level of Assistance 1: Close supervision, Minimal verbal cues    Outcome Measures:    [facility] Basic Mobility  Turning from your back to your side while in a flat bed without using bedrails: None  Moving from lying on your back to sitting on the side of a flat bed without using bedrails: A little  Moving to and from bed to chair (including a wheelchair): A little  Standing up from a chair using your arms (e.g. wheelchair or bedside chair): A little  To walk in hospital room: A little  Climbing 3-5 steps with railing: A little  Basic Mobility - Total Score: 19    Education Documentation  Precautions, taught by Christopher Mendiola, PT at 8/15/2024  6:05 PM.  Learner: Patient  Readiness: Eager  Method: Explanation  Response: Verbalizes Understanding, Demonstrated Understanding    Body Mechanics, taught by Christopher Mendiola, PT at 8/15/2024  6:05 PM.  Learner: Patient  Readiness: Eager  Method: Explanation  Response: Verbalizes Understanding, Demonstrated Understanding    Mobility Training, taught by Christopher Mendiola, PT at 8/15/2024  6:05 PM.  Learner: Patient  Readiness: Eager  Method: Explanation  Response: Verbalizes Understanding, Demonstrated Understanding    Education Comments  No comments found.        OP EDUCATION:       Encounter Problems       Encounter Problems (Active)       Balance       Tinetti>24 to reflect improvement in balance and decreased falls risk  (Progressing)       Start:  08/12/24    Expected End:  08/26/24               Mobility       Ambulate >150 feet, LRD, Ind  (Progressing)       Start:  08/12/24    Expected End:  08/26/24            up/dn 5 stairs, Ind (Progressing)       Start:  08/12/24    Expected End:  08/26/24               PT Transfers       Pt able to perform bed mobility Ind from a flat bed without rail using Log Roll  (Progressing)        Start:  08/12/24    Expected End:  08/26/24            sit<>stand, bed<>chair, LRD, Ind  (Progressing)       Start:  08/12/24    Expected End:  08/26/24

## 2024-08-16 ENCOUNTER — PHARMACY VISIT (OUTPATIENT)
Dept: PHARMACY | Facility: CLINIC | Age: 59
End: 2024-08-16
Payer: MEDICAID

## 2024-08-16 VITALS
HEART RATE: 109 BPM | SYSTOLIC BLOOD PRESSURE: 144 MMHG | RESPIRATION RATE: 20 BRPM | OXYGEN SATURATION: 96 % | DIASTOLIC BLOOD PRESSURE: 89 MMHG | HEIGHT: 66 IN | BODY MASS INDEX: 26.36 KG/M2 | TEMPERATURE: 96.6 F | WEIGHT: 164 LBS

## 2024-08-16 LAB
BLOOD EXPIRATION DATE: NORMAL
DISPENSE STATUS: NORMAL
PRODUCT BLOOD TYPE: 600
PRODUCT CODE: NORMAL
UNIT ABO: NORMAL
UNIT NUMBER: NORMAL
UNIT RH: NORMAL
UNIT VOLUME: 350
XM INTEP: NORMAL

## 2024-08-16 PROCEDURE — 2500000001 HC RX 250 WO HCPCS SELF ADMINISTERED DRUGS (ALT 637 FOR MEDICARE OP): Performed by: STUDENT IN AN ORGANIZED HEALTH CARE EDUCATION/TRAINING PROGRAM

## 2024-08-16 PROCEDURE — 2500000005 HC RX 250 GENERAL PHARMACY W/O HCPCS: Performed by: STUDENT IN AN ORGANIZED HEALTH CARE EDUCATION/TRAINING PROGRAM

## 2024-08-16 PROCEDURE — 2500000001 HC RX 250 WO HCPCS SELF ADMINISTERED DRUGS (ALT 637 FOR MEDICARE OP)

## 2024-08-16 PROCEDURE — 2500000001 HC RX 250 WO HCPCS SELF ADMINISTERED DRUGS (ALT 637 FOR MEDICARE OP): Performed by: NURSE PRACTITIONER

## 2024-08-16 PROCEDURE — 2500000004 HC RX 250 GENERAL PHARMACY W/ HCPCS (ALT 636 FOR OP/ED): Performed by: STUDENT IN AN ORGANIZED HEALTH CARE EDUCATION/TRAINING PROGRAM

## 2024-08-16 PROCEDURE — RXMED WILLOW AMBULATORY MEDICATION CHARGE

## 2024-08-16 RX ORDER — LIDOCAINE 560 MG/1
1 PATCH PERCUTANEOUS; TOPICAL; TRANSDERMAL DAILY
Qty: 12 PATCH | Refills: 0 | Status: SHIPPED | OUTPATIENT
Start: 2024-08-16

## 2024-08-16 RX ORDER — AMOXICILLIN 250 MG
2 CAPSULE ORAL DAILY
Qty: 14 TABLET | Refills: 0 | Status: SHIPPED | OUTPATIENT
Start: 2024-08-16

## 2024-08-16 RX ORDER — CYCLOBENZAPRINE HCL 5 MG
5 TABLET ORAL 3 TIMES DAILY PRN
Qty: 12 TABLET | Refills: 0 | Status: SHIPPED | OUTPATIENT
Start: 2024-08-16

## 2024-08-16 RX ORDER — OXYCODONE HYDROCHLORIDE 5 MG/1
5 TABLET ORAL EVERY 6 HOURS PRN
Qty: 20 TABLET | Refills: 0 | Status: SHIPPED | OUTPATIENT
Start: 2024-08-16

## 2024-08-16 ASSESSMENT — COGNITIVE AND FUNCTIONAL STATUS - GENERAL
CLIMB 3 TO 5 STEPS WITH RAILING: A LITTLE
DAILY ACTIVITIY SCORE: 24
MOBILITY SCORE: 22
WALKING IN HOSPITAL ROOM: A LITTLE

## 2024-08-16 ASSESSMENT — PAIN SCALES - GENERAL
PAINLEVEL_OUTOF10: 4
PAINLEVEL_OUTOF10: 4
PAINLEVEL_OUTOF10: 3
PAINLEVEL_OUTOF10: 8

## 2024-08-16 ASSESSMENT — PAIN - FUNCTIONAL ASSESSMENT
PAIN_FUNCTIONAL_ASSESSMENT: 0-10

## 2024-08-16 ASSESSMENT — PAIN DESCRIPTION - LOCATION: LOCATION: BACK

## 2024-08-16 NOTE — CARE PLAN
Problem: Pain  Goal: Takes deep breaths with improved pain control throughout the shift  Outcome: Progressing  Goal: Turns in bed with improved pain control throughout the shift  Outcome: Progressing     Problem: Skin  Goal: Decreased wound size/increased tissue granulation at next dressing change  Outcome: Progressing  Flowsheets (Taken 8/16/2024 0245)  Decreased wound size/increased tissue granulation at next dressing change: Promote sleep for wound healing  Goal: Participates in plan/prevention/treatment measures  Outcome: Progressing  Flowsheets (Taken 8/16/2024 0245)  Participates in plan/prevention/treatment measures: Elevate heels  Goal: Prevent/manage excess moisture  Outcome: Progressing  Flowsheets (Taken 8/16/2024 0245)  Prevent/manage excess moisture: Moisturize dry skin  Goal: Prevent/minimize sheer/friction injuries  Outcome: Progressing  Flowsheets (Taken 8/16/2024 0245)  Prevent/minimize sheer/friction injuries: Increase activity/out of bed for meals  Goal: Promote/optimize nutrition  Outcome: Progressing  Flowsheets (Taken 8/16/2024 0245)  Promote/optimize nutrition: Monitor/record intake including meals  Goal: Promote skin healing  Outcome: Progressing  Flowsheets (Taken 8/16/2024 0245)  Promote skin healing: Assess skin/pad under line(s)/device(s)   The patient's goals for the shift include      The clinical goals for the shift include pt will be free from falls and injury throughout shift    Over the shift, the patient did make progress toward the following goals. Barriers to progression include new surroundings, weakness. Recommendations to address these barriers include bed alarm, hourly rounding.

## 2024-08-16 NOTE — DISCHARGE SUMMARY
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 Patient reported several months of worsening, severe focal back pain, weakness, and imbalance. Initial imaging revealed intrahepatic IVC tumor thrombus, masses in the bilateral adrenal glands, T8 lytic lesion, and left upper lobe lung mass concerning for primary lung malignancy with metastasis.   8/2 Patient taken for bronchoscopy with biopsy by pulmonology.  Cytology and surgical pathology returned consistent with non-small cell lung carcinoma.   8/9 s/p T7-T9 lami, T8 bitranspedicular decompression for tumor debulking, T6-10 instrumented fusion (prelim: carcinoma)  8/10 uprights hardware in pos'n  8/12 Heparin drip started until therapeutic x48hrs per vasc med  8/14 heparin drip therapeutic and started on Eliquis BID. Hgb 6.7, s/p 1 unit PRBC with adequate improvement Hgb 8.1.    Outpatient follow up with Dr. Lisset White has been scheduled for 10/22 @ 2:30     Supportive oncology was consulted given the advanced nature of the patient's disease. New pain regimen was ordered and she noted improvement in control of her pain symptoms.     PT/OT eval recommend Low intensity therapy at time of discharge.   On the day of discharge, the patient was seen and evaluated by the neurosurgery team and deemed suitable for discharge.  There were no significant events overnight. Vitals were reviewed and within normal limits. Labs were stable at discharge. On day of discharge the patient was tolerating a diet, pain was controlled on PO pain medication, was ambulating well and voiding spontaneously. The patient was given detailed  discharge instructions and were scheduled to follow up as an outpatient.           Pertinent Physical Exam At Time of Discharge  Physical Exam  Eyes:      Pupils: Pupils are equal, round, and reactive to light.   Cardiovascular:      Pulses: Normal pulses.   Pulmonary:      Effort: Pulmonary effort is normal.   Abdominal:      Palpations: Abdomen is soft.   Skin:     Comments: Incision C/D/I   Neurological:      Mental Status: She is alert and oriented to person, place, and time.      Comments: 5/5 x4         Home Medications     Medication List      START taking these medications     apixaban 5 mg tablet; Commonly known as: Eliquis; Take 1 tablet (5 mg)   by mouth 2 times a day.   cyclobenzaprine 5 mg tablet; Commonly known as: Flexeril; Take 1 tablet   (5 mg) by mouth 3 times a day as needed for muscle spasms.   lidocaine 4 % patch; Place 1 patch over 12 hours on the skin once daily.   Remove & discard patch within 12 hours or as directed by MD.   oxyCODONE 5 mg immediate release tablet; Commonly known as: Roxicodone;   Take 1 tablet (5 mg) by mouth every 6 hours if needed for moderate pain (4   - 6) or severe pain (7 - 10).   sennosides-docusate sodium 8.6-50 mg tablet; Commonly known as:   Indira-Colace; Take 2 tablets by mouth once daily. For constipation while   taking pain medication Oxycodone     CONTINUE taking these medications     atorvastatin 40 mg tablet; Commonly known as: Lipitor   citalopram 20 mg tablet; Commonly known as: CeleXA   hydrOXYzine HCL 25 mg tablet; Commonly known as: Atarax   lisinopriL-hydrochlorothiazide 10-12.5 mg tablet   potassium chloride CR 10 mEq ER tablet; Commonly known as: Klor-Con       Outpatient Follow-Up  Future Appointments   Date Time Provider Department Center   8/26/2024 10:00 AM Franck Stafford PA-C [id number] None   8/27/2024  1:00 PM Dean Ward MD [id number] [facility]   10/22/2024  2:30 PM Lisset White MD [id number] [facility]   11/19/2024 11:00 AM Sunil MURDOCK  MD Krystle PhD [id number] None       Yolie Pulido, APRN-CNP  Total face to face time spent with patient/family of 30 minutes, with >50% of the time spent discussing plan of care/management, counseling/educating on disease processes, explaining results of diagnostic testing.

## 2024-08-16 NOTE — PROGRESS NOTES
Physical Therapy                 Therapy Communication Note    Patient Name: Kassandra Veras  MRN: [id number]  Today's Date: 8/16/2024     Discipline: Physical Therapy    Missed Visit Reason: Missed Visit Reason: Patient refused (due to having just walked in hallway and being too fatigued to participate in PT at this time.  PT will re-attempt as schedule allows)    Missed Time: Attempt 2577

## 2024-08-18 LAB
LAB AP ASR DISCLAIMER: NORMAL
LABORATORY COMMENT REPORT: NORMAL
Lab: NORMAL
PATH REPORT.ADDENDUM SPEC: NORMAL
PATH REPORT.COMMENTS IMP SPEC: NORMAL
PATH REPORT.FINAL DX SPEC: NORMAL
PATH REPORT.GROSS SPEC: NORMAL
PATH REPORT.RELEVANT HX SPEC: NORMAL
PATH REPORT.TOTAL CANCER: NORMAL

## 2024-08-19 LAB — FOCUSED SOLID TUMOR DNA/RNA RESULTS: NORMAL

## 2024-08-20 NOTE — PROGRESS NOTES
Music Therapy Note    Kassandra Veras     Therapy Session  Referral Type: New referral this admission  Visit Type: New visit  Session Start Time: 1604  Session End Time: 1617  Intervention Delivery: In-person  Conflict of Service: None  Family Present for Session: None     Pre-assessment  Unable to Assess Reason: Outcomes not applicable  Mood/Affect: Appropriate, Calm, Cooperative         Treatment/Interventions  Music Therapy Interventions: Assessment  Interruption: No  Patient Fell Asleep at End of Session: No    Post-assessment  Unable to Assess Reason: Did not provide expressive therapy intervention  Mood/Affect: Appropriate, Calm, Cooperative  Continue Visiting: No  Total Session Time (min): 13 minutes    Narrative  Assessment Detail: Patient presented awake and alert, in bed with HOB raised, displaying calm affect. MT introduced self and role and pt was receptive to music therapy education. Patient shared that they do enjoy Cristóbal Wilma, [employer], and mindfulness and meditation music. Patient voiced interest in learning about chakras. Patient declined music intervention at this time but voiced openness to the modality in the future.  Follow-up: Follow-up not indicated as pt is discharged.    Education Documentation  No documentation found.

## 2024-08-26 ENCOUNTER — APPOINTMENT (OUTPATIENT)
Facility: CLINIC | Age: 59
End: 2024-08-26
Payer: COMMERCIAL

## 2024-08-26 PROBLEM — C34.12 PRIMARY MALIGNANT NEOPLASM OF LEFT UPPER LOBE OF LUNG (MULTI): Status: ACTIVE | Noted: 2024-07-31

## 2024-08-26 NOTE — PROGRESS NOTES
Patient ID: Kassandra Veras is a 58 y.o. 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      DISEASE ASSOCIATED GENOMIC FINDINGS:   CDKN2A p.D84N (NM_000077 c.250G>A)  KEAP1 p.G429C ([id number] c.1285G>T)  KRAS p.G12D (NM_[id number] c.35G>A)     DISEASE RELEVANT ALTERATIONS NOT DETECTED:   Negative for ALK fusion.  Negative for BRAF V600E.  Negative for EGFR sensitizing mutation.  Negative for ERBB2 activating mutation  Negative for KRAS G12C.  Negative for MET exon 14 skipping mutation.  Negative for NTRK fusion.  Negative for RET fusion.  Negative for ROS1 fusion.      PRIOR THERAPIES        CURRENT THERAPY      CURRENT ONCOLOGICAL PROBLEMS       HISTORY OF PRESENT ILLNESS  July 2024 abdominal and back pain worsening  7/31/24 right upper pole renal mass, IVC thrombus  7/31/24 CT PE - no PE, left upper lobe suprahilar mass with invasion into mediastinum and encasement of JESICA pulm arterial branches, enlarged subcarinal LN  7/31/24 CT AP - bilateral adrenal masses suspicious for metastases, T8 lytic lesion with tumor involvement, several subcutaneous nodules  8/1/24 MRI brain - no mets  8/1/24 MRI spine - T8 acute compression deformity, multiple metastases in thoracic spine      PAST MEDICAL HISTORY      SURGICAL HISTORY       SOCIAL HISTORY       FAMILY HISTORY      CURRENT MEDS REVIEWED       ALLERGIES REVIEWED        SUBJECTIVE:    A 13 point review of systems was performed, with significant findings documented above in subjective history.    OBJECTIVE:  There were no vitals filed for this visit.   There is no height or weight on file to calculate BSA.     Wt Readings from Last 5 Encounters:   07/31/24 74.4 kg (164 lb)       {ECOGSCORE:74883:::1}    Diagnostic Results    Results:  Labs:  Lab Results   Component Value Date    WBC 11.7 (H) 08/15/2024    HGB 8.0 (L) 08/15/2024    HCT 25.5 (L) 08/15/2024    MCV 88 08/15/2024     (H) 08/15/2024      Lab Results   Component Value Date    NEUTROABS 8.72 (H) 08/11/2024        Lab Results   Component Value Date    GLUCOSE 83 08/14/2024    CALCIUM 7.4 (L) 08/14/2024     (L) 08/14/2024    K 4.2 08/14/2024    CO2 27 08/14/2024    CL 99 08/14/2024    BUN 9 08/14/2024    CREATININE 0.74 08/14/2024    MG 2.32 08/11/2024     Lab Results   Component Value Date    ALT 11 07/31/2024    AST 14 07/31/2024    ALKPHOS 154 (H) 07/31/2024    BILITOT 0.6 07/31/2024      Lab Results   Component Value Date    ACTH <1.5 (L) 08/06/2024    CORTISOL 29.8 (H) 08/06/2024         No images are attached to the encounter or orders placed in the encounter.     Assessment/Plan     No matching staging information was found for the patient.        Dean Ward MD  Thoracic Medical Oncology   [address] [place] [address]  Phone: [phone]

## 2024-08-27 ENCOUNTER — APPOINTMENT (OUTPATIENT)
Dept: HEMATOLOGY/ONCOLOGY | Facility: CLINIC | Age: 59
End: 2024-08-27
Payer: COMMERCIAL

## 2024-08-29 ENCOUNTER — TUMOR BOARD CONFERENCE (OUTPATIENT)
Dept: HEMATOLOGY/ONCOLOGY | Facility: CLINIC | Age: 59
End: 2024-08-29
Payer: COMMERCIAL

## 2024-08-29 NOTE — TUMOR BOARD NOTE
Patient Name: ERIKA ROGERS  MRN: [id number]  Physician: Sylvia  Date of Collection: 08-  Report Date: 8.18.2024  Primary Location of Tumor: Left Upper Lobe  Histology: SMARC4 Deficient Carcinoma  Stage: IV  Location of Metastasis: Adrenal Glands, Bone, Peritoneum   PDL 1: 10%  Actionable Alteration:  None    Disease Relevant Alterations: CDKN2A p.D84N (NM_000077 c.250G>A)  KEAP1 p.G429C (NM_203500 c.1285G>T)  KRAS p.G12D (NM_033360 c.35G>A)      Recommendations: Standard of Care:Chemotherapy+Immunotherapy    Clinical Trials (First line):   [id number]):A Phase 1b, Multicenter, 2-Part, Open-Label Study of Datopotamab Deruxtecan (Data-DXd) in Combination With Durvalumab With or Without [place] Chemotherapy in Subjects With Advanced or Metastatic Non-Small Cell Lung Cancer ([facility])    QVWV3P62(NCT):A Phase I/II Basket Trial of the EGF Vaccine CIMAvax in Combination With Anti-PD1 Therapy in Patients With Advanced NSCLC or Squamous Head and Neck Cancer

## 2024-09-02 NOTE — PROGRESS NOTES
Patient ID: Kassandra Veras is a 58 y.o. 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      DISEASE ASSOCIATED GENOMIC FINDINGS:   CDKN2A p.D84N ([id number] c.250G>A)  KEAP1 p.G429C ([id number] c.1285G>T)  KRAS p.G12D (NM_[id number] c.35G>A)     DISEASE RELEVANT ALTERATIONS NOT DETECTED:   Negative for ALK fusion.  Negative for BRAF V600E.  Negative for EGFR sensitizing mutation.  Negative for ERBB2 activating mutation  Negative for KRAS G12C.  Negative for MET exon 14 skipping mutation.  Negative for NTRK fusion.  Negative for RET fusion.  Negative for ROS1 fusion.      PRIOR THERAPIES        CURRENT THERAPY      CURRENT ONCOLOGICAL PROBLEMS       HISTORY OF PRESENT ILLNESS  July 2024 abdominal and back pain worsening  7/31/24 right upper pole renal mass, IVC thrombus  7/31/24 CT PE - no PE, left upper lobe suprahilar mass with invasion into mediastinum and encasement of JESICA pulm arterial branches, enlarged subcarinal LN  7/31/24 CT AP - bilateral adrenal masses suspicious for metastases, T8 lytic lesion with tumor involvement, several subcutaneous nodules  8/1/24 MRI brain - no mets  8/1/24 MRI spine - T8 acute compression deformity, multiple metastases in thoracic spine      PAST MEDICAL HISTORY      SURGICAL HISTORY       SOCIAL HISTORY       FAMILY HISTORY      CURRENT MEDS REVIEWED       ALLERGIES REVIEWED        SUBJECTIVE:    A 13 point review of systems was performed, with significant findings documented above in subjective history.    OBJECTIVE:  There were no vitals filed for this visit.   There is no height or weight on file to calculate BSA.     Wt Readings from Last 5 Encounters:   07/31/24 74.4 kg (164 lb)       {ECOGSCORE:60896:::1}    Diagnostic Results    Results:  Labs:  Lab Results   Component Value Date    WBC 11.7 (H) 08/15/2024    HGB 8.0 (L) 08/15/2024    HCT 25.5 (L) 08/15/2024    MCV 88 08/15/2024     (H) 08/15/2024      Lab Results   Component Value Date    NEUTROABS 8.72 (H) 08/11/2024        Lab Results   Component Value Date    GLUCOSE 83 08/14/2024    CALCIUM 7.4 (L) 08/14/2024     (L) 08/14/2024    K 4.2 08/14/2024    CO2 27 08/14/2024    CL 99 08/14/2024    BUN 9 08/14/2024    CREATININE 0.74 08/14/2024    MG 2.32 08/11/2024     Lab Results   Component Value Date    ALT 11 07/31/2024    AST 14 07/31/2024    ALKPHOS 154 (H) 07/31/2024    BILITOT 0.6 07/31/2024      Lab Results   Component Value Date    ACTH <1.5 (L) 08/06/2024    CORTISOL 29.8 (H) 08/06/2024         No images are attached to the encounter or orders placed in the encounter.     Assessment/Plan     Primary malignant neoplasm of left upper lobe of lung (Multi), Clinical: Stage IVB (cT4, cN2, pM1c)        Dean Ward MD  Thoracic Medical Oncology   [id number] [place] [address]  Phone: [phone]

## 2024-09-03 ENCOUNTER — APPOINTMENT (OUTPATIENT)
Dept: HEMATOLOGY/ONCOLOGY | Facility: CLINIC | Age: 59
End: 2024-09-03
Payer: COMMERCIAL

## 2024-09-03 ENCOUNTER — HOME CARE VISIT (OUTPATIENT)
Dept: HOME HEALTH SERVICES | Facility: HOME HEALTH | Age: 59
End: 2024-09-03

## 2024-09-06 ENCOUNTER — APPOINTMENT (OUTPATIENT)
Dept: NEUROSURGERY | Facility: CLINIC | Age: 59
End: 2024-09-06
Payer: COMMERCIAL

## 2024-09-09 ENCOUNTER — APPOINTMENT (OUTPATIENT)
Dept: RADIATION ONCOLOGY | Facility: CLINIC | Age: 59
End: 2024-09-09
Payer: COMMERCIAL

## 2024-09-19 ENCOUNTER — APPOINTMENT (OUTPATIENT)
Facility: CLINIC | Age: 59
End: 2024-09-19
Payer: COMMERCIAL

## 2024-10-22 ENCOUNTER — APPOINTMENT (OUTPATIENT)
Dept: CARDIOLOGY | Facility: CLINIC | Age: 59
End: 2024-10-22
Payer: COMMERCIAL

## 2024-11-19 ENCOUNTER — APPOINTMENT (OUTPATIENT)
Facility: CLINIC | Age: 59
End: 2024-11-19
Payer: COMMERCIAL

## (undated) DEVICE — ELECTRODE, GROUND PLATE

## (undated) DEVICE — SUTURE, VICRYL, 0, 18 IN, UNDYED

## (undated) DEVICE — WOUND SYSTEM, DEBRIDEMENT & CLEANING, O.R DUOPAK

## (undated) DEVICE — KIT, PATIENT CARE, JACKSON TABLE W/PRONE-SAFE HEADREST

## (undated) DEVICE — DRESSING, MEPILEX, BORDER FLEX, 6 X 8

## (undated) DEVICE — MARKER, SKIN, RULER AND LABEL PACK, CUSTOM

## (undated) DEVICE — ELECTRODE, CORKSCREW NEEDLE 1.5M LENGTH

## (undated) DEVICE — Device

## (undated) DEVICE — SPHERE, STEALTHSTATION, 5-PK

## (undated) DEVICE — ELECTRODE, ELECTROSURGICAL, BLADE, INSULATED, ENT/IMA, STERILE

## (undated) DEVICE — SPONGE, HEMOSTATIC, GELATIN, SURGIFOAM, 8 X 12.5 CM X 10 MM

## (undated) DEVICE — TUBING, SMOKE EVAC, 3/8 X 10 FT

## (undated) DEVICE — MANIFOLD, 4 PORT NEPTUNE STANDARD

## (undated) DEVICE — COVER PROBE, SOFT FLEX W/ GEL, 5 X 48 IN (13X122CM)

## (undated) DEVICE — ELECTRODE, ELECTROSURGICAL, BLADE EXT 4 INCH, INSULATED

## (undated) DEVICE — NEEDLE, ELECTRODE, SUBDERMAL, PAIRED, 2.0 LEAD, DISP

## (undated) DEVICE — DRESSING, MEPILEX, BORDER FLEX, 3 X 3

## (undated) DEVICE — SUTURE, VICRYL, 4-0, 18 IN, UNDYED BR PS-2

## (undated) DEVICE — CAUTERY, PENCIL, PUSH BUTTON, SMOKE EVAC, 70MM

## (undated) DEVICE — EVACUATOR, WOUND, CLOSED, 3 SPRING, 400 CC, Y CONNECTING TUBE

## (undated) DEVICE — DRAPE, SHEET, FAN FOLDED, HALF, 44 X 58 IN, DISPOSABLE, LF, STERILE

## (undated) DEVICE — BUR, 3MM X 3.8MM, PRECISION, NEURO DRILL

## (undated) DEVICE — DRESSING, MEPILEX, POST OP, 8 X 4

## (undated) DEVICE — DRAIN, WOUND, ROUND, W/TROCAR, HOLE PATTERN, 10 IN, MEDIUM, 1/8 X 49 IN

## (undated) DEVICE — SEALANT, HEMOSTATIC, FLOSEAL, 10 ML

## (undated) DEVICE — PAD, GROUNDING, ELECTROSURGICAL, W/9 FT CABLE, POLYHESIVE II, ADULT, LF

## (undated) DEVICE — CLOSURE SYSTEM, DERMABOND, PRINEO, 22CM, STERILE

## (undated) DEVICE — APPLICATOR, CHLORAPREP, W/ORANGE TINT, 26ML

## (undated) DEVICE — TUBING, SUCTION, CONNECTING, STERILE 0.25 X 120 IN., LF

## (undated) DEVICE — TAPE, SILK, DURAPORE, 3 IN X 10 YD, LF

## (undated) DEVICE — DRAPE, C-ARM IMAGE

## (undated) DEVICE — COVER, CART, 45 X 27 X 48 IN, CLEAR

## (undated) DEVICE — SPONGE, HEMOSTATIC, CELLULOSE, SURGICEL, 2 X 14 IN